# Patient Record
Sex: FEMALE | Race: WHITE | NOT HISPANIC OR LATINO
[De-identification: names, ages, dates, MRNs, and addresses within clinical notes are randomized per-mention and may not be internally consistent; named-entity substitution may affect disease eponyms.]

---

## 2017-06-30 ENCOUNTER — LABORATORY RESULT (OUTPATIENT)
Age: 62
End: 2017-06-30

## 2017-06-30 ENCOUNTER — APPOINTMENT (OUTPATIENT)
Dept: INTERNAL MEDICINE | Facility: CLINIC | Age: 62
End: 2017-06-30
Payer: SELF-PAY

## 2017-06-30 VITALS
HEART RATE: 80 BPM | SYSTOLIC BLOOD PRESSURE: 110 MMHG | OXYGEN SATURATION: 97 % | WEIGHT: 140 LBS | DIASTOLIC BLOOD PRESSURE: 80 MMHG | BODY MASS INDEX: 28.22 KG/M2 | HEIGHT: 59 IN | TEMPERATURE: 98.7 F

## 2017-06-30 VITALS — DIASTOLIC BLOOD PRESSURE: 70 MMHG | SYSTOLIC BLOOD PRESSURE: 120 MMHG

## 2017-06-30 DIAGNOSIS — K59.00 CONSTIPATION, UNSPECIFIED: ICD-10-CM

## 2017-06-30 PROCEDURE — 99213 OFFICE O/P EST LOW 20 MIN: CPT | Mod: 25

## 2017-06-30 PROCEDURE — 36415 COLL VENOUS BLD VENIPUNCTURE: CPT

## 2017-07-03 LAB
ALBUMIN SERPL ELPH-MCNC: 4.4 G/DL
ALP BLD-CCNC: 51 U/L
ALT SERPL-CCNC: 11 U/L
ANION GAP SERPL CALC-SCNC: 17 MMOL/L
AST SERPL-CCNC: 20 U/L
BASOPHILS # BLD AUTO: 0.01 K/UL
BASOPHILS NFR BLD AUTO: 0.2 %
BILIRUB SERPL-MCNC: 0.7 MG/DL
BUN SERPL-MCNC: 15 MG/DL
CALCIUM SERPL-MCNC: 10.3 MG/DL
CHLORIDE SERPL-SCNC: 103 MMOL/L
CO2 SERPL-SCNC: 22 MMOL/L
CREAT SERPL-MCNC: 0.83 MG/DL
EOSINOPHIL # BLD AUTO: 0.07 K/UL
EOSINOPHIL NFR BLD AUTO: 1.1 %
GLUCOSE SERPL-MCNC: 94 MG/DL
HCT VFR BLD CALC: 41.1 %
HGB BLD-MCNC: 13.2 G/DL
IMM GRANULOCYTES NFR BLD AUTO: 0.2 %
LYMPHOCYTES # BLD AUTO: 1.39 K/UL
LYMPHOCYTES NFR BLD AUTO: 22 %
MAN DIFF?: NORMAL
MCHC RBC-ENTMCNC: 29.7 PG
MCHC RBC-ENTMCNC: 32.1 GM/DL
MCV RBC AUTO: 92.6 FL
MONOCYTES # BLD AUTO: 0.34 K/UL
MONOCYTES NFR BLD AUTO: 5.4 %
NEUTROPHILS # BLD AUTO: 4.51 K/UL
NEUTROPHILS NFR BLD AUTO: 71.1 %
PLATELET # BLD AUTO: 270 K/UL
POTASSIUM SERPL-SCNC: 4.3 MMOL/L
PROT SERPL-MCNC: 7.7 G/DL
RBC # BLD: 4.44 M/UL
RBC # FLD: 13.2 %
SODIUM SERPL-SCNC: 142 MMOL/L
WBC # FLD AUTO: 6.33 K/UL

## 2017-07-13 ENCOUNTER — APPOINTMENT (OUTPATIENT)
Dept: INTERNAL MEDICINE | Facility: CLINIC | Age: 62
End: 2017-07-13

## 2017-07-13 ENCOUNTER — APPOINTMENT (OUTPATIENT)
Dept: INTERNAL MEDICINE | Facility: CLINIC | Age: 62
End: 2017-07-13
Payer: MEDICAID

## 2017-07-13 VITALS
WEIGHT: 142 LBS | DIASTOLIC BLOOD PRESSURE: 74 MMHG | BODY MASS INDEX: 28.68 KG/M2 | HEART RATE: 94 BPM | SYSTOLIC BLOOD PRESSURE: 138 MMHG

## 2017-07-13 DIAGNOSIS — Z87.09 PERSONAL HISTORY OF OTHER DISEASES OF THE RESPIRATORY SYSTEM: ICD-10-CM

## 2017-07-13 DIAGNOSIS — Z23 ENCOUNTER FOR IMMUNIZATION: ICD-10-CM

## 2017-07-13 DIAGNOSIS — Z87.898 PERSONAL HISTORY OF OTHER SPECIFIED CONDITIONS: ICD-10-CM

## 2017-07-13 DIAGNOSIS — Z12.11 ENCOUNTER FOR SCREENING FOR MALIGNANT NEOPLASM OF COLON: ICD-10-CM

## 2017-07-13 DIAGNOSIS — Z87.440 PERSONAL HISTORY OF URINARY (TRACT) INFECTIONS: ICD-10-CM

## 2017-07-13 DIAGNOSIS — Z87.891 PERSONAL HISTORY OF NICOTINE DEPENDENCE: ICD-10-CM

## 2017-07-13 DIAGNOSIS — M79.89 OTHER SPECIFIED SOFT TISSUE DISORDERS: ICD-10-CM

## 2017-07-13 DIAGNOSIS — Z12.4 ENCOUNTER FOR SCREENING FOR MALIGNANT NEOPLASM OF CERVIX: ICD-10-CM

## 2017-07-13 DIAGNOSIS — R14.0 ABDOMINAL DISTENSION (GASEOUS): ICD-10-CM

## 2017-07-13 DIAGNOSIS — Z12.31 ENCOUNTER FOR SCREENING MAMMOGRAM FOR MALIGNANT NEOPLASM OF BREAST: ICD-10-CM

## 2017-07-13 DIAGNOSIS — R06.83 SNORING: ICD-10-CM

## 2017-07-13 DIAGNOSIS — R20.8 OTHER DISTURBANCES OF SKIN SENSATION: ICD-10-CM

## 2017-07-13 PROCEDURE — 93306 TTE W/DOPPLER COMPLETE: CPT

## 2017-07-13 PROCEDURE — 99214 OFFICE O/P EST MOD 30 MIN: CPT | Mod: 25

## 2017-07-13 PROCEDURE — 93000 ELECTROCARDIOGRAM COMPLETE: CPT

## 2017-07-13 RX ORDER — SIMETHICONE 80 MG/1
80 TABLET, CHEWABLE ORAL
Qty: 1 | Refills: 0 | Status: DISCONTINUED | COMMUNITY
Start: 2017-06-30 | End: 2017-07-13

## 2017-07-19 ENCOUNTER — APPOINTMENT (OUTPATIENT)
Dept: INTERNAL MEDICINE | Facility: CLINIC | Age: 62
End: 2017-07-19
Payer: MEDICAID

## 2017-07-19 VITALS — SYSTOLIC BLOOD PRESSURE: 118 MMHG | DIASTOLIC BLOOD PRESSURE: 70 MMHG

## 2017-07-19 VITALS
TEMPERATURE: 98.6 F | SYSTOLIC BLOOD PRESSURE: 102 MMHG | BODY MASS INDEX: 28.68 KG/M2 | DIASTOLIC BLOOD PRESSURE: 74 MMHG | WEIGHT: 142 LBS

## 2017-07-19 DIAGNOSIS — E66.3 OVERWEIGHT: ICD-10-CM

## 2017-07-19 DIAGNOSIS — R00.2 PALPITATIONS: ICD-10-CM

## 2017-07-19 PROCEDURE — 99243 OFF/OP CNSLTJ NEW/EST LOW 30: CPT

## 2017-08-03 ENCOUNTER — APPOINTMENT (OUTPATIENT)
Dept: INTERNAL MEDICINE | Facility: CLINIC | Age: 62
End: 2017-08-03

## 2017-08-29 ENCOUNTER — APPOINTMENT (OUTPATIENT)
Dept: INTERNAL MEDICINE | Facility: CLINIC | Age: 62
End: 2017-08-29

## 2017-09-27 ENCOUNTER — APPOINTMENT (OUTPATIENT)
Dept: INTERNAL MEDICINE | Facility: CLINIC | Age: 62
End: 2017-09-27
Payer: MEDICAID

## 2017-09-27 VITALS
BODY MASS INDEX: 28.22 KG/M2 | TEMPERATURE: 98.2 F | SYSTOLIC BLOOD PRESSURE: 112 MMHG | HEIGHT: 59 IN | OXYGEN SATURATION: 98 % | DIASTOLIC BLOOD PRESSURE: 68 MMHG | HEART RATE: 86 BPM | RESPIRATION RATE: 16 BRPM | WEIGHT: 140 LBS

## 2017-09-27 DIAGNOSIS — M54.5 LOW BACK PAIN: ICD-10-CM

## 2017-09-27 DIAGNOSIS — Z86.018 PERSONAL HISTORY OF OTHER BENIGN NEOPLASM: ICD-10-CM

## 2017-09-27 LAB
BILIRUB UR QL STRIP: NEGATIVE
GLUCOSE UR-MCNC: NORMAL
HCG UR QL: 0.2 EU/DL
HGB UR QL STRIP.AUTO: NEGATIVE
KETONES UR-MCNC: NEGATIVE
LEUKOCYTE ESTERASE UR QL STRIP: NEGATIVE
NITRITE UR QL STRIP: NEGATIVE
PH UR STRIP: 6.5
PROT UR STRIP-MCNC: NEGATIVE
SP GR UR STRIP: 1.02

## 2017-09-27 PROCEDURE — 81002 URINALYSIS NONAUTO W/O SCOPE: CPT

## 2017-09-27 PROCEDURE — 99214 OFFICE O/P EST MOD 30 MIN: CPT | Mod: 25

## 2017-09-28 ENCOUNTER — APPOINTMENT (OUTPATIENT)
Dept: ULTRASOUND IMAGING | Facility: CLINIC | Age: 62
End: 2017-09-28
Payer: MEDICAID

## 2017-09-28 ENCOUNTER — OUTPATIENT (OUTPATIENT)
Dept: OUTPATIENT SERVICES | Facility: HOSPITAL | Age: 62
LOS: 1 days | End: 2017-09-28
Payer: MEDICAID

## 2017-09-28 DIAGNOSIS — Z00.8 ENCOUNTER FOR OTHER GENERAL EXAMINATION: ICD-10-CM

## 2017-09-28 LAB
APPEARANCE: CLEAR
BILIRUBIN URINE: NEGATIVE
BLOOD URINE: NEGATIVE
COLOR: YELLOW
GLUCOSE QUALITATIVE U: NORMAL MG/DL
KETONES URINE: NEGATIVE
LEUKOCYTE ESTERASE URINE: NEGATIVE
NITRITE URINE: NEGATIVE
PH URINE: 6.5
PROTEIN URINE: NEGATIVE MG/DL
SPECIFIC GRAVITY URINE: 1.02
UROBILINOGEN URINE: NORMAL MG/DL

## 2017-09-28 PROCEDURE — 76700 US EXAM ABDOM COMPLETE: CPT | Mod: 26

## 2017-09-28 PROCEDURE — 76856 US EXAM PELVIC COMPLETE: CPT | Mod: 26

## 2017-09-28 PROCEDURE — 76700 US EXAM ABDOM COMPLETE: CPT

## 2017-09-28 PROCEDURE — 76830 TRANSVAGINAL US NON-OB: CPT

## 2017-09-28 PROCEDURE — 76856 US EXAM PELVIC COMPLETE: CPT

## 2017-09-28 PROCEDURE — 76830 TRANSVAGINAL US NON-OB: CPT | Mod: 26

## 2017-09-29 ENCOUNTER — CHART COPY (OUTPATIENT)
Age: 62
End: 2017-09-29

## 2018-01-03 ENCOUNTER — APPOINTMENT (OUTPATIENT)
Dept: INTERNAL MEDICINE | Facility: CLINIC | Age: 63
End: 2018-01-03

## 2018-07-20 ENCOUNTER — LABORATORY RESULT (OUTPATIENT)
Age: 63
End: 2018-07-20

## 2018-07-20 ENCOUNTER — APPOINTMENT (OUTPATIENT)
Dept: INTERNAL MEDICINE | Facility: CLINIC | Age: 63
End: 2018-07-20
Payer: MEDICAID

## 2018-07-20 VITALS
HEART RATE: 94 BPM | DIASTOLIC BLOOD PRESSURE: 80 MMHG | HEIGHT: 59 IN | BODY MASS INDEX: 28.63 KG/M2 | WEIGHT: 142 LBS | OXYGEN SATURATION: 97 % | SYSTOLIC BLOOD PRESSURE: 110 MMHG

## 2018-07-20 DIAGNOSIS — M25.649 STIFFNESS OF UNSPECIFIED HAND, NOT ELSEWHERE CLASSIFIED: ICD-10-CM

## 2018-07-20 DIAGNOSIS — M25.676 STIFFNESS OF UNSPECIFIED FOOT, NOT ELSEWHERE CLASSIFIED: ICD-10-CM

## 2018-07-20 DIAGNOSIS — Z01.818 ENCOUNTER FOR OTHER PREPROCEDURAL EXAMINATION: ICD-10-CM

## 2018-07-20 DIAGNOSIS — R22.30 LOCALIZED SWELLING, MASS AND LUMP, UNSPECIFIED UPPER LIMB: ICD-10-CM

## 2018-07-20 DIAGNOSIS — R10.31 RIGHT LOWER QUADRANT PAIN: ICD-10-CM

## 2018-07-20 PROCEDURE — 36415 COLL VENOUS BLD VENIPUNCTURE: CPT

## 2018-07-20 PROCEDURE — 99214 OFFICE O/P EST MOD 30 MIN: CPT | Mod: 25

## 2018-07-20 RX ORDER — MELOXICAM 15 MG/1
15 TABLET ORAL
Qty: 30 | Refills: 1 | Status: DISCONTINUED | COMMUNITY
Start: 2017-09-27 | End: 2018-07-20

## 2018-07-20 NOTE — PHYSICAL EXAM
[No Joint Swelling] : no joint swelling [de-identified] : L foot- tenderness - general tenderness of all MT- P, PIP jt but more 1st toe.  R foot tenderness 2nd MT- P jt.  R hand- no tenderness, swelling- good ROM of wrist , jt.  L hand- tenderness PIP, DIP jt of all fingers- good ROM

## 2018-07-20 NOTE — HISTORY OF PRESENT ILLNESS
[FreeTextEntry8] : CC: stiffness in fingers , feet\par \par 6 wk of  (?tingling)stiffness, wax and waning cramping  on all fingers ,L foot- all toes.  pain DIP jt - 2 nd- area of nodule-4-5 wk ago.  unsure how long she had the nodule\par pt also hand redness of DIP jt -unsure duration but it resolved.  no swelling of jt\par \par now - cramping resolved for the past 3 wk but still has stiffness. stiffness- more at night.  duration of stiffness- constant\par no other alleviating/ aggravating factors. no meds taken\par no fever, \par post nasal drip- chronic- tickle in throat, nasal congestion.  not taking any meds.  previous tried flonase and didn't like the sensation of it.\par

## 2018-07-20 NOTE — REVIEW OF SYSTEMS
[Fever] : no fever [Joint Swelling] : no joint swelling [Skin Rash] : no skin rash [FreeTextEntry1] : see HPI

## 2018-07-23 LAB
ALBUMIN SERPL ELPH-MCNC: 4.2 G/DL
ALP BLD-CCNC: 43 U/L
ALT SERPL-CCNC: 13 U/L
ANION GAP SERPL CALC-SCNC: 12 MMOL/L
AST SERPL-CCNC: 19 U/L
B BURGDOR IGG+IGM SER QL IB: NORMAL
BILIRUB SERPL-MCNC: 0.4 MG/DL
BUN SERPL-MCNC: 11 MG/DL
CALCIUM SERPL-MCNC: 9.4 MG/DL
CHLORIDE SERPL-SCNC: 104 MMOL/L
CO2 SERPL-SCNC: 26 MMOL/L
CREAT SERPL-MCNC: 0.69 MG/DL
ERYTHROCYTE [SEDIMENTATION RATE] IN BLOOD BY WESTERGREN METHOD: 24 MM/HR
GLUCOSE SERPL-MCNC: 98 MG/DL
MAGNESIUM SERPL-MCNC: 2 MG/DL
POTASSIUM SERPL-SCNC: 4.7 MMOL/L
PROT SERPL-MCNC: 6.9 G/DL
SODIUM SERPL-SCNC: 142 MMOL/L
TSH SERPL-ACNC: 1.66 UIU/ML

## 2018-08-14 ENCOUNTER — APPOINTMENT (OUTPATIENT)
Dept: ORTHOPEDIC SURGERY | Facility: CLINIC | Age: 63
End: 2018-08-14

## 2018-08-15 ENCOUNTER — APPOINTMENT (OUTPATIENT)
Dept: INTERNAL MEDICINE | Facility: CLINIC | Age: 63
End: 2018-08-15
Payer: MEDICAID

## 2018-08-15 PROCEDURE — 93306 TTE W/DOPPLER COMPLETE: CPT | Mod: TC

## 2018-08-15 PROCEDURE — 93306 TTE W/DOPPLER COMPLETE: CPT | Mod: 26

## 2018-09-17 ENCOUNTER — RX RENEWAL (OUTPATIENT)
Age: 63
End: 2018-09-17

## 2018-10-19 ENCOUNTER — RX RENEWAL (OUTPATIENT)
Age: 63
End: 2018-10-19

## 2018-12-01 ENCOUNTER — TRANSCRIPTION ENCOUNTER (OUTPATIENT)
Age: 63
End: 2018-12-01

## 2018-12-07 ENCOUNTER — APPOINTMENT (OUTPATIENT)
Dept: INTERNAL MEDICINE | Facility: CLINIC | Age: 63
End: 2018-12-07
Payer: COMMERCIAL

## 2018-12-07 VITALS
OXYGEN SATURATION: 98 % | HEART RATE: 80 BPM | TEMPERATURE: 97.6 F | WEIGHT: 134 LBS | SYSTOLIC BLOOD PRESSURE: 120 MMHG | HEIGHT: 59 IN | BODY MASS INDEX: 27.01 KG/M2 | DIASTOLIC BLOOD PRESSURE: 80 MMHG

## 2018-12-07 DIAGNOSIS — E73.9 LACTOSE INTOLERANCE, UNSPECIFIED: ICD-10-CM

## 2018-12-07 DIAGNOSIS — M79.646 PAIN IN UNSPECIFIED FINGER(S): ICD-10-CM

## 2018-12-07 DIAGNOSIS — L30.9 DERMATITIS, UNSPECIFIED: ICD-10-CM

## 2018-12-07 DIAGNOSIS — R09.82 POSTNASAL DRIP: ICD-10-CM

## 2018-12-07 DIAGNOSIS — R19.7 DIARRHEA, UNSPECIFIED: ICD-10-CM

## 2018-12-07 PROCEDURE — 99214 OFFICE O/P EST MOD 30 MIN: CPT

## 2018-12-07 RX ORDER — LORATADINE 5 MG/5 ML
10 SOLUTION, ORAL ORAL
Qty: 30 | Refills: 2 | Status: DISCONTINUED | COMMUNITY
Start: 2018-07-20 | End: 2018-12-07

## 2018-12-07 RX ORDER — MONTELUKAST 10 MG/1
10 TABLET, FILM COATED ORAL DAILY
Qty: 30 | Refills: 1 | Status: DISCONTINUED | COMMUNITY
Start: 2018-07-20 | End: 2018-12-07

## 2018-12-07 NOTE — PLAN
[FreeTextEntry1] : use A&D ointment after bm.  use hydrocortisone if itching\par diarrhea- due to pt eating cheese.  pt is lactose intolerant-pt to be compliant w lactose free diet. \par if pt is still having issues- she will call me.

## 2018-12-07 NOTE — HISTORY OF PRESENT ILLNESS
[FreeTextEntry8] : CC: rectal\par pt went to UC for irritation around anal area from constant wiping- for past few days..  pt was given a meds- which caused her have nausea, vomiting. no chg in bm\par freq bm-  states has lactose intolerance- eating cheese- pt was have diarrhea, abd cramping, gassy when she eats cheese\par \par post nasal drip last visit- pt was started on claritin, singular- medication did help but she is non compliant w meds. \par nodule on finger- pt was told to f/u w Ortho- didn't f/u b/c it didn't bother her. \par

## 2018-12-14 ENCOUNTER — RESULT REVIEW (OUTPATIENT)
Age: 63
End: 2018-12-14

## 2018-12-31 ENCOUNTER — RX RENEWAL (OUTPATIENT)
Age: 63
End: 2018-12-31

## 2019-01-31 ENCOUNTER — RX RENEWAL (OUTPATIENT)
Age: 64
End: 2019-01-31

## 2019-03-04 ENCOUNTER — RX RENEWAL (OUTPATIENT)
Age: 64
End: 2019-03-04

## 2019-05-15 ENCOUNTER — LABORATORY RESULT (OUTPATIENT)
Age: 64
End: 2019-05-15

## 2019-05-15 DIAGNOSIS — Z12.31 ENCOUNTER FOR SCREENING MAMMOGRAM FOR MALIGNANT NEOPLASM OF BREAST: ICD-10-CM

## 2019-07-08 ENCOUNTER — RESULT REVIEW (OUTPATIENT)
Age: 64
End: 2019-07-08

## 2019-07-08 DIAGNOSIS — N60.09 SOLITARY CYST OF UNSPECIFIED BREAST: ICD-10-CM

## 2020-07-07 ENCOUNTER — APPOINTMENT (OUTPATIENT)
Dept: ORTHOPEDIC SURGERY | Facility: CLINIC | Age: 65
End: 2020-07-07
Payer: COMMERCIAL

## 2020-07-07 PROCEDURE — 73130 X-RAY EXAM OF HAND: CPT | Mod: LT

## 2020-07-07 PROCEDURE — 99204 OFFICE O/P NEW MOD 45 MIN: CPT | Mod: 25

## 2020-07-07 PROCEDURE — 29125 APPL SHORT ARM SPLINT STATIC: CPT | Mod: LT

## 2020-07-07 NOTE — HISTORY OF PRESENT ILLNESS
[de-identified] : Aida Zaragoza 64 Y LHD F who presents with left hand pain.  She states that 5 weeks ago fell onto her left hand.  She was seen in the ER where xrays demonstrated a 3rd metacarpal fracture.  She was was put in a cast.  She reports that during her last visit doctor visit, her cast was removed prematurely and she was given a removable wrist splint.   The wrist splint is causing her to have more pain in her palm  Her pain is a 6/10 and radiates down her forearm and into her 3rd-5th digits. She cannot flex the digits without pain.  No numbness.

## 2020-07-07 NOTE — PHYSICAL EXAM
[de-identified] : Constitutional: Well-nourished, well-developed, No acute distress\par Respiratory:  Good respiratory effort, no SOB\par Lymphatic: No regional lymphadenopathy, no lymphedema\par Psychiatric: Pleasant and normal affect, alert and oriented x3\par Skin: Clean dry and intact B/L UE/LE\par Musculoskeletal: normal except where as noted in regional exam\par \par \par left Hand:\par APPEARANCE: + swelling dorsum of hand\par POSITIVE TENDERNESS: ++ tenderness along 3rd metacarpal\par ROM unable to flex digit due to pain.\par no malalignment on closed fist, but limited due to pain\par Vasc: 2+ radial pulse, normal capillary refill\par Neuro: AIN, PIN, Ulnar nerve intact to motor\par Sensation: Intact to light touch throughout\par B/L Shoulders:  No asymmetry, malalignment, or swelling, Full ROM, 5/5 strength in flexion/ext, Abd/Add, IR/ER, Joints stable\par B/L Elbows:  No asymmetry, malalignment, or swelling, Full ROM, 5/5 strength in flex/ext, pronation/supination, Joints stable\par B/L wrists: No asymmetry, malalignment, or swelling, Full ROM, 5/5 strength in wrist flexion/ext, and radial/ulnar deviation, Joints stable\par \par \par \par  [de-identified] : \par The following radiographs were ordered and read by me during this patient's visit. I reviewed each radiograph in detail with the patient and discussed the findings as highlighted below. \par \par 3 views of the left hand were obtained today that show a spiral fracture of the 3rd metacarpal

## 2020-07-07 NOTE — DISCUSSION/SUMMARY
[de-identified] : Aida presents 5 weeks s/p 3rd metacarpal fracture.  She continues to have severe pain and restricted range of motion despite weeks of immobilization.  No obvious overlapping of the digits, but testing is limited by pain.  Temporary metacarpal splint placed today.  I have referred her to hand surgery to discuss further treatment options.\par \par Antoinette Ray MD, EdM\par Sports Medicine [facility]\par Department of Orthopedics\par

## 2020-07-07 NOTE — PROCEDURE
[de-identified] : Indication: metacarpal fracture\par Splint type: short arm metacarpal splint\par \par The above cast was applied without incident, distal neurovascular testing was intact after application.  Patient tolerated the procedure well.\par

## 2020-07-16 ENCOUNTER — APPOINTMENT (OUTPATIENT)
Dept: ORTHOPEDIC SURGERY | Facility: CLINIC | Age: 65
End: 2020-07-16
Payer: COMMERCIAL

## 2020-07-16 DIAGNOSIS — S62.309A UNSPECIFIED FRACTURE OF UNSPECIFIED METACARPAL BONE, INITIAL ENCOUNTER FOR CLOSED FRACTURE: ICD-10-CM

## 2020-07-16 PROCEDURE — 29125 APPL SHORT ARM SPLINT STATIC: CPT | Mod: LT

## 2020-07-16 PROCEDURE — 99214 OFFICE O/P EST MOD 30 MIN: CPT | Mod: 25

## 2020-07-16 NOTE — DISCUSSION/SUMMARY
[de-identified] : The underlying pathophysiology was reviewed with the patient. Treatment options were discussed including; observation, NSAIDS, analgesics, bracing, physical therapy. \par \par Patient was molded into a short arm splint. She was advised to utilize the splint so long as she is having pain and then wean herself off of it once she is better able to tolerate her symptoms. \par She should work on pumping the hand and using it for ADLs. \par The patient was advised to soak the hand in warm water and Epsom salt. \par Topical analgesics as needed. \par NSAIDs, as tolerated, were advised for pain and symptom management. \par Follow up in 4 weeks for repeat x-rays.

## 2020-07-16 NOTE — ADDENDUM
[FreeTextEntry1] : I, Devorah Restrepo wrote this note acting as a scribe for Dr. Christos Hoffman on Jul 16, 2020.

## 2020-07-16 NOTE — PHYSICAL EXAM
[de-identified] : X-rays of the left hand taken on 07/07/2020 revealed a mildly displaced spiral fracture along the third metacarpal.  [de-identified] : Patient is WDWN, alert, and in no acute distress. Breathing is unlabored. She is grossly oriented to person, place, and time. \par \par Left hand: Tenderness to palpation over the third metacarpal shaft. No visible deformities. No no malalignment on closed fist, but limited due to pain. ROM unable to flex digit due to pain. All intrinsic and extrinsic hand muscles 5/5. No joint instability on provocative testing. Sensation is intact to light touch.

## 2020-07-16 NOTE — CONSULT LETTER
[Consult Letter:] : I had the pleasure of evaluating your patient, [unfilled]. [Dear  ___] : Dear  [unfilled], [Please see my note below.] : Please see my note below. [Consult Closing:] : Thank you very much for allowing me to participate in the care of this patient.  If you have any questions, please do not hesitate to contact me. [Sincerely,] : Sincerely, [FreeTextEntry2] : EFRAÍN TRIANA,SAYRA WILLIAMSON  [FreeTextEntry3] : Christos Hoffman MD

## 2020-07-16 NOTE — END OF VISIT
[FreeTextEntry3] : I, Christos Hoffman MD, ordering physician, have read and attest that all the information, medical decision making and discharge instructions within are true and accurate.

## 2020-07-16 NOTE — HISTORY OF PRESENT ILLNESS
[de-identified] : Patient is a LHD 64 year old female who presents with a left third metacarpal injury. She injured the hand x 6 weeks ago while she was in [place]. She was treated at  [facility] ED where x-rays were taken. X-rays revealed a spiral fracture through the third metacarpal shaft. A short arm cast was applied however she returned several days later as the cast was too tight. She was recasted and returned several weeks later for repeat x-rays. She states that the attending MA removed the cast prematurely at the 3.5 week nikia. She was placed in  a short arm splint, however she reports it being inadequate as it did not extend the entire length of the palm. The patient was referred here today after being seen by Dr. Antoinette Ray on 07/07/2020. She continues to experience pain in the hand especially when attempting to  or hold anything with it, although she has admitted to it improving slowly but gradually. She denies numbness or tingling. Denies DM or tobacco use. \par

## 2020-12-07 ENCOUNTER — APPOINTMENT (OUTPATIENT)
Dept: INTERNAL MEDICINE | Facility: CLINIC | Age: 65
End: 2020-12-07
Payer: COMMERCIAL

## 2020-12-07 DIAGNOSIS — H92.02 OTALGIA, LEFT EAR: ICD-10-CM

## 2020-12-07 DIAGNOSIS — H92.09 OTALGIA, UNSPECIFIED EAR: ICD-10-CM

## 2020-12-07 PROCEDURE — 99213 OFFICE O/P EST LOW 20 MIN: CPT | Mod: 95

## 2020-12-30 ENCOUNTER — TRANSCRIPTION ENCOUNTER (OUTPATIENT)
Age: 65
End: 2020-12-30

## 2020-12-31 ENCOUNTER — LABORATORY RESULT (OUTPATIENT)
Age: 65
End: 2020-12-31

## 2020-12-31 ENCOUNTER — APPOINTMENT (OUTPATIENT)
Dept: INTERNAL MEDICINE | Facility: CLINIC | Age: 65
End: 2020-12-31
Payer: COMMERCIAL

## 2020-12-31 VITALS
SYSTOLIC BLOOD PRESSURE: 102 MMHG | BODY MASS INDEX: 26.81 KG/M2 | WEIGHT: 133 LBS | HEIGHT: 59 IN | DIASTOLIC BLOOD PRESSURE: 60 MMHG

## 2020-12-31 VITALS — TEMPERATURE: 98.1 F

## 2020-12-31 PROCEDURE — 99213 OFFICE O/P EST LOW 20 MIN: CPT

## 2020-12-31 PROCEDURE — 99072 ADDL SUPL MATRL&STAF TM PHE: CPT

## 2020-12-31 RX ORDER — AZITHROMYCIN 250 MG/1
250 TABLET, FILM COATED ORAL
Qty: 1 | Refills: 0 | Status: COMPLETED | COMMUNITY
Start: 2020-12-10 | End: 2020-12-31

## 2020-12-31 NOTE — HISTORY OF PRESENT ILLNESS
[FreeTextEntry8] : pt presents c/o 2 days of dysuria and dark urine and mild chills overnight--c/w previous UTIs. She admits she wasn't drinking much fluids  yesterday because she was "running around doing errands all day"\par She has  h/o recurrent UTIs in the past -last UTI was 6-12 months ago.\par \par No flank pain or incontinence.\par \par

## 2020-12-31 NOTE — REVIEW OF SYSTEMS
[Chills] : chills [Dysuria] : dysuria [Hematuria] : hematuria [Frequency] : frequency [Fever] : no fever [Incontinence] : no incontinence [Nocturia] : no nocturia

## 2021-01-01 ENCOUNTER — NON-APPOINTMENT (OUTPATIENT)
Age: 66
End: 2021-01-01

## 2021-01-02 ENCOUNTER — NON-APPOINTMENT (OUTPATIENT)
Age: 66
End: 2021-01-02

## 2021-09-02 DIAGNOSIS — R92.2 INCONCLUSIVE MAMMOGRAM: ICD-10-CM

## 2022-01-06 ENCOUNTER — NON-APPOINTMENT (OUTPATIENT)
Age: 67
End: 2022-01-06

## 2022-01-11 ENCOUNTER — APPOINTMENT (OUTPATIENT)
Dept: ORTHOPEDIC SURGERY | Facility: CLINIC | Age: 67
End: 2022-01-11
Payer: MEDICARE

## 2022-01-11 PROCEDURE — 99204 OFFICE O/P NEW MOD 45 MIN: CPT

## 2022-01-11 PROCEDURE — 73130 X-RAY EXAM OF HAND: CPT | Mod: LT

## 2022-05-12 ENCOUNTER — APPOINTMENT (OUTPATIENT)
Dept: INTERNAL MEDICINE | Facility: CLINIC | Age: 67
End: 2022-05-12
Payer: COMMERCIAL

## 2022-05-12 ENCOUNTER — LABORATORY RESULT (OUTPATIENT)
Age: 67
End: 2022-05-12

## 2022-05-12 ENCOUNTER — NON-APPOINTMENT (OUTPATIENT)
Age: 67
End: 2022-05-12

## 2022-05-12 VITALS
WEIGHT: 132 LBS | HEIGHT: 59 IN | TEMPERATURE: 97.4 F | DIASTOLIC BLOOD PRESSURE: 70 MMHG | SYSTOLIC BLOOD PRESSURE: 106 MMHG | BODY MASS INDEX: 26.61 KG/M2

## 2022-05-12 DIAGNOSIS — I34.0 NONRHEUMATIC MITRAL (VALVE) INSUFFICIENCY: ICD-10-CM

## 2022-05-12 DIAGNOSIS — Z23 ENCOUNTER FOR IMMUNIZATION: ICD-10-CM

## 2022-05-12 DIAGNOSIS — Z81.4 FAMILY HISTORY OF OTHER SUBSTANCE ABUSE AND DEPENDENCE: ICD-10-CM

## 2022-05-12 DIAGNOSIS — M85.89 OTHER SPECIFIED DISORDERS OF BONE DENSITY AND STRUCTURE, MULTIPLE SITES: ICD-10-CM

## 2022-05-12 DIAGNOSIS — H91.93 UNSPECIFIED HEARING LOSS, BILATERAL: ICD-10-CM

## 2022-05-12 DIAGNOSIS — Z82.49 FAMILY HISTORY OF ISCHEMIC HEART DISEASE AND OTHER DISEASES OF THE CIRCULATORY SYSTEM: ICD-10-CM

## 2022-05-12 DIAGNOSIS — Z01.84 ENCOUNTER FOR ANTIBODY RESPONSE EXAMINATION: ICD-10-CM

## 2022-05-12 DIAGNOSIS — Z00.00 ENCOUNTER FOR GENERAL ADULT MEDICAL EXAMINATION W/OUT ABNORMAL FINDINGS: ICD-10-CM

## 2022-05-12 DIAGNOSIS — R53.83 OTHER FATIGUE: ICD-10-CM

## 2022-05-12 DIAGNOSIS — Z82.61 FAMILY HISTORY OF ARTHRITIS: ICD-10-CM

## 2022-05-12 DIAGNOSIS — Z13.820 ENCOUNTER FOR SCREENING FOR OSTEOPOROSIS: ICD-10-CM

## 2022-05-12 PROCEDURE — G0402 INITIAL PREVENTIVE EXAM: CPT

## 2022-05-12 PROCEDURE — 90472 IMMUNIZATION ADMIN EACH ADD: CPT

## 2022-05-12 PROCEDURE — G0403: CPT

## 2022-05-12 PROCEDURE — 99214 OFFICE O/P EST MOD 30 MIN: CPT | Mod: 25

## 2022-05-12 PROCEDURE — 90670 PCV13 VACCINE IM: CPT

## 2022-05-12 PROCEDURE — G0009: CPT

## 2022-05-12 PROCEDURE — 90715 TDAP VACCINE 7 YRS/> IM: CPT

## 2022-05-16 PROBLEM — Z23 NEED FOR TDAP VACCINATION: Status: ACTIVE | Noted: 2022-05-12

## 2022-05-16 PROBLEM — Z81.4 FAMILY HISTORY OF SUBSTANCE ABUSE: Status: ACTIVE | Noted: 2022-05-16

## 2022-05-16 PROBLEM — H91.93 DECREASED HEARING OF BOTH EARS: Status: ACTIVE | Noted: 2022-05-12

## 2022-05-16 NOTE — HEALTH RISK ASSESSMENT
[Former] : Former [Patient reported mammogram was normal] : Patient reported mammogram was normal [Patient reported bone density results were abnormal] : Patient reported bone density results were abnormal [HIV test declined] : HIV test declined [Hepatitis C test declined] : Hepatitis C test declined [5-9] : 5-9 [Never (0 pts)] : Never (0 points) [No] : In the past 12 months have you used drugs other than those required for medical reasons? No [No falls in past year] : Patient reported no falls in the past year [0] : 2) Feeling down, depressed, or hopeless: Not at all (0) [Patient reported PAP Smear was normal] : Patient reported PAP Smear was normal [Intercurrent Urgi Care visits] : went to urgent care [Patient declined colonoscopy] : Patient declined colonoscopy [None] : None [With Family] : lives with family [Retired] : retired [College] : College [] :  [# Of Children ___] : has [unfilled] children [Feels Safe at Home] : Feels safe at home [Fully functional (bathing, dressing, toileting, transferring, walking, feeding)] : Fully functional (bathing, dressing, toileting, transferring, walking, feeding) [Fully functional (using the telephone, shopping, preparing meals, housekeeping, doing laundry, using] : Fully functional and needs no help or supervision to perform IADLs (using the telephone, shopping, preparing meals, housekeeping, doing laundry, using transportation, managing medications and managing finances) [Reports changes in hearing] : Reports changes in hearing [Reports normal functional visual acuity (ie: able to read med bottle)] : Reports normal functional visual acuity [Smoke Detector] : smoke detector [Carbon Monoxide Detector] : carbon monoxide detector [Seat Belt] :  uses seat belt [Sunscreen] : uses sunscreen [With Patient/Caregiver] : , with patient/caregiver [Designated Healthcare Proxy] : Designated healthcare proxy [Name: ___] : Health Care Proxy's Name: [unfilled]  [Relationship: ___] : Relationship: [unfilled] [de-identified] : for UTI [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 [de-identified] : 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 [YearQuit] : 1981 [Audit-CScore] : 0 [de-identified] : walks daily for 45-60 minutes [de-identified] : admits to eating poorly "lots of junk food" [[id number]] : 0 [Change in mental status noted] : No change in mental status noted [Language] : denies difficulty with language [Behavior] : denies difficulty with behavior [Learning/Retaining New Information] : denies difficulty learning/retaining new information [Handling Complex Tasks] : denies difficulty handling complex tasks [Reasoning] : denies difficulty with reasoning [Spatial Ability and Orientation] : denies difficulty with spatial ability and orientation [Sexually Active] : not sexually active [High Risk Behavior] : no high risk behavior [Reports changes in vision] : Reports no changes in vision [Reports changes in dental health] : Reports no changes in dental health [Guns at Home] : no guns at home [Travel to Developing Areas] : does not  travel to developing areas [TB Exposure] : is not being exposed to tuberculosis [MammogramDate] : 09/21 [PapSmearDate] : 01/19 [PapSmearComments] : more than 2 years ago ? name--wants to see new gyn [BoneDensityDate] : 01/20 [BoneDensityComments] : h/o osteoporosis/osteopenia-had IV Reclast with gyn 4-5 yrs ago [ColonoscopyDate] : 05/19 [ColonoscopyComments] : did Cologuard [de-identified] : lives with her aunt [FreeTextEntry2] : retired form [employer] [AdvancecareDate] : 05/22

## 2022-05-16 NOTE — ASSESSMENT
[FreeTextEntry1] : Patient received the Moderna Covid vaccines on the following dates:\par \par 03/13/2021\par 04/10/2021\par \par 12/14/2021

## 2022-05-16 NOTE — HISTORY OF PRESENT ILLNESS
[FreeTextEntry1] : Pt. presents for a comprehensive evaluation for multiple medical issues.\par  [de-identified] : Patient has been in good overall health this past year.\par \par She has a ? h/o moderate to severe MR--discovered by echo 7/2017 incidentally,  She saw Dr. Golden Cosby for cardiac evaluation but has not had a f/u echo or evaluation since given no symptoms of palpitations, chest pressure of SOB or LH.\par \par She is  / retired from working for the [employer] in 2019 and lives with her aunt whom she cares for.  She has a 34 yr old daughter who is pregnant and due with a baby boy in 10/2022--It will be her first grandchild.\par \par Patient lives in a two family home above her 76 yo aunt who suffers from COPD-on chronic oxygen.\par \par She enjoys traveling--will be traveling to [place] with her aunt this summer\par \par

## 2022-05-17 LAB
25(OH)D3 SERPL-MCNC: 22.8 NG/ML
ALBUMIN SERPL ELPH-MCNC: 4.4 G/DL
ALP BLD-CCNC: 77 U/L
ALT SERPL-CCNC: 12 U/L
ANION GAP SERPL CALC-SCNC: 13 MMOL/L
APPEARANCE: CLEAR
AST SERPL-CCNC: 16 U/L
BASOPHILS # BLD AUTO: 0.03 K/UL
BASOPHILS NFR BLD AUTO: 0.6 %
BILIRUB SERPL-MCNC: 0.4 MG/DL
BILIRUBIN URINE: NEGATIVE
BLOOD URINE: NEGATIVE
BUN SERPL-MCNC: 11 MG/DL
CALCIUM SERPL-MCNC: 9.6 MG/DL
CHLORIDE SERPL-SCNC: 104 MMOL/L
CHOLEST SERPL-MCNC: 208 MG/DL
CO2 SERPL-SCNC: 25 MMOL/L
COLOR: NORMAL
COVID-19 NUCLEOCAPSID  GAM ANTIBODY INTERPRETATION: POSITIVE
CREAT SERPL-MCNC: 0.69 MG/DL
EGFR: 96 ML/MIN/1.73M2
EOSINOPHIL # BLD AUTO: 0.23 K/UL
EOSINOPHIL NFR BLD AUTO: 4.3 %
ERYTHROCYTE [SEDIMENTATION RATE] IN BLOOD BY WESTERGREN METHOD: 52 MM/HR
ESTIMATED AVERAGE GLUCOSE: 114 MG/DL
GLUCOSE QUALITATIVE U: NEGATIVE
GLUCOSE SERPL-MCNC: 98 MG/DL
HBA1C MFR BLD HPLC: 5.6 %
HCT VFR BLD CALC: 41.2 %
HDLC SERPL-MCNC: 52 MG/DL
HGB BLD-MCNC: 13.1 G/DL
IMM GRANULOCYTES NFR BLD AUTO: 0 %
KETONES URINE: NEGATIVE
LDLC SERPL CALC-MCNC: 142 MG/DL
LDLC SERPL DIRECT ASSAY-MCNC: 135 MG/DL
LEUKOCYTE ESTERASE URINE: ABNORMAL
LYMPHOCYTES # BLD AUTO: 1.52 K/UL
LYMPHOCYTES NFR BLD AUTO: 28.1 %
MAN DIFF?: NORMAL
MCHC RBC-ENTMCNC: 29.7 PG
MCHC RBC-ENTMCNC: 31.8 GM/DL
MCV RBC AUTO: 93.4 FL
MONOCYTES # BLD AUTO: 0.39 K/UL
MONOCYTES NFR BLD AUTO: 7.2 %
NEUTROPHILS # BLD AUTO: 3.24 K/UL
NEUTROPHILS NFR BLD AUTO: 59.8 %
NITRITE URINE: NEGATIVE
NONHDLC SERPL-MCNC: 156 MG/DL
PH URINE: 6.5
PLATELET # BLD AUTO: 226 K/UL
POTASSIUM SERPL-SCNC: 3.9 MMOL/L
PROT SERPL-MCNC: 7.1 G/DL
PROTEIN URINE: NEGATIVE
RBC # BLD: 4.41 M/UL
RBC # FLD: 13.1 %
SARS-COV-2 AB SERPL QL IA: 47.8 INDEX
SODIUM SERPL-SCNC: 141 MMOL/L
SPECIFIC GRAVITY URINE: 1.01
T3FREE SERPL-MCNC: 3 PG/ML
T4 FREE SERPL-MCNC: 1.3 NG/DL
TRIGL SERPL-MCNC: 70 MG/DL
TSH SERPL-ACNC: 1.14 UIU/ML
UROBILINOGEN URINE: NORMAL
WBC # FLD AUTO: 5.41 K/UL

## 2022-06-08 ENCOUNTER — APPOINTMENT (OUTPATIENT)
Dept: OTOLARYNGOLOGY | Facility: CLINIC | Age: 67
End: 2022-06-08
Payer: MEDICARE

## 2022-06-08 VITALS
BODY MASS INDEX: 26.61 KG/M2 | DIASTOLIC BLOOD PRESSURE: 86 MMHG | SYSTOLIC BLOOD PRESSURE: 125 MMHG | HEART RATE: 80 BPM | WEIGHT: 132 LBS | TEMPERATURE: 97.6 F | HEIGHT: 59 IN

## 2022-06-08 DIAGNOSIS — H90.3 SENSORINEURAL HEARING LOSS, BILATERAL: ICD-10-CM

## 2022-06-08 DIAGNOSIS — J34.2 DEVIATED NASAL SEPTUM: ICD-10-CM

## 2022-06-08 DIAGNOSIS — H93.13 TINNITUS, BILATERAL: ICD-10-CM

## 2022-06-08 DIAGNOSIS — R09.81 NASAL CONGESTION: ICD-10-CM

## 2022-06-08 PROCEDURE — 31231 NASAL ENDOSCOPY DX: CPT

## 2022-06-08 PROCEDURE — 99204 OFFICE O/P NEW MOD 45 MIN: CPT | Mod: 25

## 2022-06-08 PROCEDURE — 92557 COMPREHENSIVE HEARING TEST: CPT

## 2022-06-08 PROCEDURE — 92567 TYMPANOMETRY: CPT

## 2022-06-08 NOTE — CONSULT LETTER
[Dear  ___] : Dear  [unfilled], [Consult Letter:] : I had the pleasure of evaluating your patient, [unfilled]. [Please see my note below.] : Please see my note below. [Consult Closing:] : Thank you very much for allowing me to participate in the care of this patient.  If you have any questions, please do not hesitate to contact me. [Sincerely,] : Sincerely, [FreeTextEntry3] : Gale Johnston MD\par Otolaryngology and Cranial Base Surgery\par Attending Physician - Department of Otolaryngology and Head & Neck Surgery\par [facility]\par  - [employer] [facility]\par Office: [phone]\par Fax: [phone]\par

## 2022-06-08 NOTE — ASSESSMENT
[FreeTextEntry1] : Nasal congestion:\par - Start Flonase \par \par Tinnitus and SNHL:\par - asymmetry worse on left; may be related to trauma she had in past but will check an MRI to be sure no retrocochlear pathology\par - Audiogram today with HF SNHL\par - recommend HAE\par \par \par \par I personally saw and examined Ms. KATHLEEN HUDSON in detail this visit today. I personally reviewed the HPI, PMH, FH, SH, ROS and medications/allergies. I have spoken to JUANITA Wilkinson regarding the history and have personally determined the assessment and plan of care, and documented this myself. I was present and participated in all key portions of the encounter and all procedures noted above. I have made changes in the body of the note where appropriate.\par \par Attesting Faculty: See Attending Signature Below

## 2022-06-08 NOTE — HISTORY OF PRESENT ILLNESS
[de-identified] : 65 y/o F with b/l tinnitus and long term decreased hearing.  No pain.  Occasional popping on left, did have trauma to left ear in the past.  \par Also with DNS, occasional congestion. No sinus symptoms.

## 2022-06-08 NOTE — PROCEDURE
[FreeTextEntry6] : Flexible scope #28 was used. Right nasal passage with normal inferior, middle and superior turbinates. Nasal passage patent with clear middle meatus and sphenoethmoid recess. Left nasal passage with normal inferior, middle and superior turbinates. Nasal passage was patent with clear middle meatus and sphenoethmoid recess. No mucopurulence or polyps appreciated. Nasopharynx clear.  Mild DNS\par \par

## 2022-06-21 ENCOUNTER — APPOINTMENT (OUTPATIENT)
Dept: CARDIOLOGY | Facility: CLINIC | Age: 67
End: 2022-06-21
Payer: COMMERCIAL

## 2022-06-21 PROCEDURE — 93306 TTE W/DOPPLER COMPLETE: CPT

## 2022-06-22 ENCOUNTER — NON-APPOINTMENT (OUTPATIENT)
Age: 67
End: 2022-06-22

## 2022-06-23 ENCOUNTER — NON-APPOINTMENT (OUTPATIENT)
Age: 67
End: 2022-06-23

## 2022-09-07 ENCOUNTER — NON-APPOINTMENT (OUTPATIENT)
Age: 67
End: 2022-09-07

## 2022-09-15 ENCOUNTER — NON-APPOINTMENT (OUTPATIENT)
Age: 67
End: 2022-09-15

## 2022-11-01 ENCOUNTER — APPOINTMENT (OUTPATIENT)
Dept: MAMMOGRAPHY | Facility: IMAGING CENTER | Age: 67
End: 2022-11-01

## 2022-11-01 ENCOUNTER — APPOINTMENT (OUTPATIENT)
Dept: RADIOLOGY | Facility: IMAGING CENTER | Age: 67
End: 2022-11-01

## 2022-11-01 ENCOUNTER — RESULT REVIEW (OUTPATIENT)
Age: 67
End: 2022-11-01

## 2022-11-01 ENCOUNTER — APPOINTMENT (OUTPATIENT)
Dept: ULTRASOUND IMAGING | Facility: IMAGING CENTER | Age: 67
End: 2022-11-01

## 2022-11-01 ENCOUNTER — OUTPATIENT (OUTPATIENT)
Dept: OUTPATIENT SERVICES | Facility: HOSPITAL | Age: 67
LOS: 1 days | End: 2022-11-01
Payer: MEDICARE

## 2022-11-01 DIAGNOSIS — M85.89 OTHER SPECIFIED DISORDERS OF BONE DENSITY AND STRUCTURE, MULTIPLE SITES: ICD-10-CM

## 2022-11-01 DIAGNOSIS — R92.2 INCONCLUSIVE MAMMOGRAM: ICD-10-CM

## 2022-11-01 DIAGNOSIS — Z13.820 ENCOUNTER FOR SCREENING FOR OSTEOPOROSIS: ICD-10-CM

## 2022-11-01 DIAGNOSIS — Z00.00 ENCOUNTER FOR GENERAL ADULT MEDICAL EXAMINATION WITHOUT ABNORMAL FINDINGS: ICD-10-CM

## 2022-11-01 PROCEDURE — 77067 SCR MAMMO BI INCL CAD: CPT

## 2022-11-01 PROCEDURE — 77080 DXA BONE DENSITY AXIAL: CPT

## 2022-11-01 PROCEDURE — 77063 BREAST TOMOSYNTHESIS BI: CPT | Mod: 26

## 2022-11-01 PROCEDURE — 77080 DXA BONE DENSITY AXIAL: CPT | Mod: 26

## 2022-11-01 PROCEDURE — 76641 ULTRASOUND BREAST COMPLETE: CPT | Mod: 26,50

## 2022-11-01 PROCEDURE — 77067 SCR MAMMO BI INCL CAD: CPT | Mod: 26

## 2022-11-01 PROCEDURE — 77063 BREAST TOMOSYNTHESIS BI: CPT

## 2022-11-01 PROCEDURE — 76641 ULTRASOUND BREAST COMPLETE: CPT

## 2022-11-03 DIAGNOSIS — Z63.5 DISRUPTION OF FAMILY BY SEPARATION AND DIVORCE: ICD-10-CM

## 2022-11-03 SDOH — SOCIAL STABILITY - SOCIAL INSECURITY: DISRUPTION OF FAMILY BY SEPARATION AND DIVORCE: Z63.5

## 2022-11-07 ENCOUNTER — NON-APPOINTMENT (OUTPATIENT)
Age: 67
End: 2022-11-07

## 2022-12-01 ENCOUNTER — APPOINTMENT (OUTPATIENT)
Dept: INTERNAL MEDICINE | Facility: CLINIC | Age: 67
End: 2022-12-01

## 2022-12-01 PROCEDURE — 36415 COLL VENOUS BLD VENIPUNCTURE: CPT

## 2022-12-02 LAB
25(OH)D3 SERPL-MCNC: 34.6 NG/ML
ALBUMIN SERPL ELPH-MCNC: 4.2 G/DL
ALP BLD-CCNC: 76 U/L
ALT SERPL-CCNC: 9 U/L
ANION GAP SERPL CALC-SCNC: 11 MMOL/L
AST SERPL-CCNC: 20 U/L
BILIRUB SERPL-MCNC: 0.8 MG/DL
BUN SERPL-MCNC: 14 MG/DL
CALCIUM SERPL-MCNC: 9.6 MG/DL
CHLORIDE SERPL-SCNC: 106 MMOL/L
CO2 SERPL-SCNC: 25 MMOL/L
CREAT SERPL-MCNC: 0.75 MG/DL
EGFR: 88 ML/MIN/1.73M2
GLUCOSE SERPL-MCNC: 101 MG/DL
POTASSIUM SERPL-SCNC: 4.3 MMOL/L
PROT SERPL-MCNC: 7.2 G/DL
SODIUM SERPL-SCNC: 142 MMOL/L

## 2022-12-07 ENCOUNTER — APPOINTMENT (OUTPATIENT)
Dept: INTERNAL MEDICINE | Facility: CLINIC | Age: 67
End: 2022-12-07

## 2023-02-02 ENCOUNTER — APPOINTMENT (OUTPATIENT)
Dept: ORTHOPEDIC SURGERY | Facility: CLINIC | Age: 68
End: 2023-02-02
Payer: MEDICARE

## 2023-02-07 ENCOUNTER — APPOINTMENT (OUTPATIENT)
Dept: ORTHOPEDIC SURGERY | Facility: CLINIC | Age: 68
End: 2023-02-07
Payer: MEDICARE

## 2023-02-07 DIAGNOSIS — M19.049 PRIMARY OSTEOARTHRITIS, UNSPECIFIED HAND: ICD-10-CM

## 2023-02-07 PROCEDURE — 73130 X-RAY EXAM OF HAND: CPT | Mod: LT

## 2023-02-07 PROCEDURE — 99214 OFFICE O/P EST MOD 30 MIN: CPT | Mod: 57

## 2023-02-13 ENCOUNTER — NON-APPOINTMENT (OUTPATIENT)
Age: 68
End: 2023-02-13

## 2023-02-16 ENCOUNTER — NON-APPOINTMENT (OUTPATIENT)
Age: 68
End: 2023-02-16

## 2023-02-16 ENCOUNTER — APPOINTMENT (OUTPATIENT)
Dept: INTERNAL MEDICINE | Facility: CLINIC | Age: 68
End: 2023-02-16
Payer: MEDICARE

## 2023-02-16 VITALS
DIASTOLIC BLOOD PRESSURE: 80 MMHG | OXYGEN SATURATION: 98 % | WEIGHT: 138 LBS | HEART RATE: 83 BPM | BODY MASS INDEX: 27.82 KG/M2 | RESPIRATION RATE: 17 BRPM | HEIGHT: 59 IN | TEMPERATURE: 97.6 F | SYSTOLIC BLOOD PRESSURE: 122 MMHG

## 2023-02-16 DIAGNOSIS — M18.12 UNILATERAL PRIMARY OSTEOARTHRITIS OF FIRST CARPOMETACARPAL JOINT, LEFT HAND: ICD-10-CM

## 2023-02-16 DIAGNOSIS — Z01.818 ENCOUNTER FOR OTHER PREPROCEDURAL EXAMINATION: ICD-10-CM

## 2023-02-16 LAB — CRP SERPL-MCNC: 6 MG/L

## 2023-02-16 PROCEDURE — 99214 OFFICE O/P EST MOD 30 MIN: CPT | Mod: 25

## 2023-02-16 PROCEDURE — 93000 ELECTROCARDIOGRAM COMPLETE: CPT

## 2023-02-16 PROCEDURE — 36415 COLL VENOUS BLD VENIPUNCTURE: CPT

## 2023-02-16 RX ORDER — SULFAMETHOXAZOLE AND TRIMETHOPRIM 800; 160 MG/1; MG/1
800-160 TABLET ORAL TWICE DAILY
Qty: 14 | Refills: 0 | Status: COMPLETED | COMMUNITY
Start: 2020-12-31 | End: 2023-02-16

## 2023-02-16 RX ORDER — MELOXICAM 15 MG/1
15 TABLET ORAL
Qty: 30 | Refills: 2 | Status: COMPLETED | COMMUNITY
Start: 2022-01-11 | End: 2023-02-16

## 2023-02-16 RX ORDER — HYDROCORTISONE 10 MG/G
1 CREAM TOPICAL TWICE DAILY
Qty: 1 | Refills: 0 | Status: COMPLETED | COMMUNITY
Start: 2018-12-07 | End: 2023-02-16

## 2023-02-16 RX ORDER — ALENDRONATE SODIUM 70 MG/1
70 TABLET ORAL
Qty: 4 | Refills: 0 | Status: COMPLETED | COMMUNITY
Start: 2022-11-03 | End: 2023-02-16

## 2023-02-16 RX ORDER — LORATADINE 10 MG/1
10 TABLET ORAL
Qty: 30 | Refills: 3 | Status: COMPLETED | COMMUNITY
Start: 2018-12-31 | End: 2023-02-16

## 2023-02-16 RX ORDER — AMOXICILLIN AND CLAVULANATE POTASSIUM 875; 125 MG/1; MG/1
875-125 TABLET, COATED ORAL TWICE DAILY
Qty: 14 | Refills: 0 | Status: COMPLETED | COMMUNITY
Start: 2022-05-17 | End: 2023-02-16

## 2023-02-16 RX ORDER — FLUTICASONE PROPIONATE 50 UG/1
50 SPRAY, METERED NASAL TWICE DAILY
Qty: 2 | Refills: 3 | Status: COMPLETED | COMMUNITY
Start: 2022-06-08 | End: 2023-02-16

## 2023-02-16 NOTE — CONSULT LETTER
[Dear  ___] : Dear  [unfilled], [FreeTextEntry1] : I had the pleasure of seeing Ms. KATHLEEN BARROSO today for a preop medical clearance evaluation.\par \par Patient is medically stable and cleared for the proposed surgery.\par Please see my note below for details.\par If you should have any questions, please do not hesitate to contact me.\par \par Sincerely,\par \par Blanca Tan M.D.

## 2023-02-16 NOTE — REVIEW OF SYSTEMS
[Joint Pain] : joint pain [Joint Stiffness] : joint stiffness [Joint Swelling] : joint swelling [Negative] : Heme/Lymph [FreeTextEntry9] : stiffness and deformity of left thumb and left index finger at DIP joint

## 2023-02-16 NOTE — HISTORY OF PRESENT ILLNESS
[No Pertinent Cardiac History] : no history of aortic stenosis, atrial fibrillation, coronary artery disease, recent myocardial infarction, or implantable device/pacemaker [No Pertinent Pulmonary History] : no history of asthma, COPD, sleep apnea, or smoking [No Adverse Anesthesia Reaction] : no adverse anesthesia reaction in self or family member [(Patient denies any chest pain, claudication, dyspnea on exertion, orthopnea, palpitations or syncope)] : Patient denies any chest pain, claudication, dyspnea on exertion, orthopnea, palpitations or syncope [Chronic Anticoagulation] : no chronic anticoagulation [Chronic Kidney Disease] : no chronic kidney disease [Diabetes] : no diabetes [FreeTextEntry1] : Left Thumb and Index Finger/ IP Joint Fusion [FreeTextEntry2] : 03/06/2023 at [facility]  [FreeTextEntry3] : Dr. Karolina Liao

## 2023-02-16 NOTE — PHYSICAL EXAM
[No Acute Distress] : no acute distress [Well Nourished] : well nourished [Well Developed] : well developed [Well-Appearing] : well-appearing [Normal Sclera/Conjunctiva] : normal sclera/conjunctiva [PERRL] : pupils equal round and reactive to light [EOMI] : extraocular movements intact [Normal Outer Ear/Nose] : the outer ears and nose were normal in appearance [No JVD] : no jugular venous distention [No Lymphadenopathy] : no lymphadenopathy [Supple] : supple [Thyroid Normal, No Nodules] : the thyroid was normal and there were no nodules present [No Respiratory Distress] : no respiratory distress  [No Accessory Muscle Use] : no accessory muscle use [Clear to Auscultation] : lungs were clear to auscultation bilaterally [Normal Rate] : normal rate  [Regular Rhythm] : with a regular rhythm [Normal S1, S2] : normal S1 and S2 [No Murmur] : no murmur heard [No Carotid Bruits] : no carotid bruits [No Abdominal Bruit] : a ~M bruit was not heard ~T in the abdomen [No Varicosities] : no varicosities [Pedal Pulses Present] : the pedal pulses are present [No Edema] : there was no peripheral edema [No Palpable Aorta] : no palpable aorta [No Extremity Clubbing/Cyanosis] : no extremity clubbing/cyanosis [Normal Appearance] : normal in appearance [No Axillary Lymphadenopathy] : no axillary lymphadenopathy [Soft] : abdomen soft [Non Tender] : non-tender [Non-distended] : non-distended [No Masses] : no abdominal mass palpated [No HSM] : no HSM [Normal Bowel Sounds] : normal bowel sounds [No CVA Tenderness] : no CVA  tenderness [No Spinal Tenderness] : no spinal tenderness [Grossly Normal Strength/Tone] : grossly normal strength/tone [No Rash] : no rash [Coordination Grossly Intact] : coordination grossly intact [No Focal Deficits] : no focal deficits [Normal Gait] : normal gait [Deep Tendon Reflexes (DTR)] : deep tendon reflexes were 2+ and symmetric [Normal Affect] : the affect was normal [Normal Insight/Judgement] : insight and judgment were intact [de-identified] : nodules along distal left index finger with decreased ROM and deformity of left DIP joint and IP joint of left thumb

## 2023-02-17 ENCOUNTER — OUTPATIENT (OUTPATIENT)
Dept: OUTPATIENT SERVICES | Facility: HOSPITAL | Age: 68
LOS: 1 days | End: 2023-02-17
Payer: MEDICARE

## 2023-02-17 VITALS
RESPIRATION RATE: 18 BRPM | HEIGHT: 59 IN | OXYGEN SATURATION: 100 % | WEIGHT: 132.06 LBS | TEMPERATURE: 98 F | DIASTOLIC BLOOD PRESSURE: 84 MMHG | SYSTOLIC BLOOD PRESSURE: 125 MMHG | HEART RATE: 84 BPM

## 2023-02-17 DIAGNOSIS — Z98.890 OTHER SPECIFIED POSTPROCEDURAL STATES: Chronic | ICD-10-CM

## 2023-02-17 DIAGNOSIS — M19.042 PRIMARY OSTEOARTHRITIS, LEFT HAND: ICD-10-CM

## 2023-02-17 DIAGNOSIS — Z90.711 ACQUIRED ABSENCE OF UTERUS WITH REMAINING CERVICAL STUMP: Chronic | ICD-10-CM

## 2023-02-17 DIAGNOSIS — Z01.818 ENCOUNTER FOR OTHER PREPROCEDURAL EXAMINATION: ICD-10-CM

## 2023-02-17 PROCEDURE — G0463: CPT

## 2023-02-17 RX ORDER — LIDOCAINE HCL 20 MG/ML
0.2 VIAL (ML) INJECTION ONCE
Refills: 0 | Status: DISCONTINUED | OUTPATIENT
Start: 2023-03-06 | End: 2023-03-20

## 2023-02-17 RX ORDER — SODIUM CHLORIDE 9 MG/ML
1000 INJECTION, SOLUTION INTRAVENOUS
Refills: 0 | Status: DISCONTINUED | OUTPATIENT
Start: 2023-03-06 | End: 2023-03-20

## 2023-02-17 NOTE — H&P PST ADULT - NSICDXPASTMEDICALHX_GEN_ALL_CORE_FT
PAST MEDICAL HISTORY:  2019 novel coronavirus disease (COVID-19)     Primary osteoarthritis, left hand

## 2023-02-17 NOTE — H&P PST ADULT - NSICDXPASTSURGICALHX_GEN_ALL_CORE_FT
PAST SURGICAL HISTORY:  History of colonoscopy     History of D&C     History of nasal septoplasty     History of partial hysterectomy

## 2023-02-17 NOTE — H&P PST ADULT - NS PRO FEM  PAP SMEARS 3YRS
yes [PMH Reviewed and Updated] : past medical history reviewed and updated [PSH Reviewed and Updated] : past surgical history reviewed and updated [Family History Reviewed and Updated] : family history reviewed and updated [Medication and Allergies Reconciled] : medication and allergies reconciled [3] : 3 [Retired] : retired from work [Smoking Status Reviewed and Updated] : Smoking status was reviewed and updated [None] : The patient has no concerns about alcohol abuse [Less than 3 Times Per Week] : exercises less than 3 times per week [Adequate] : adequate [Friends] : friends [Children] : children [Fully Independent] : fully independent [Drives without concerns] : drives without concerns [No history of falls] : no history of falls [Seatbelts] : seatbelts [Safe Driving Habits] : safe driving habits [Smoke Detectors] : smoke detectors [Carbon Monoxide Detector] : carbon monoxide detector [Bathroom Grab Bars] : bathroom grab bars [Fall Prevention Measures] : fall prevention measures [Advanced Directives Discussed] : discussed at today's visit [Patient Has Full Capacity] : this patient has full decision making capacity for discussion of advance care planning [Patient Given Health Care Proxy Information] : patient was given Health Care Proxy information [Over the Past 2 Weeks, Have You Felt Down, Depressed, or Hopeless?] : 1.) Over the past 2 weeks, have you felt down, depressed, or hopeless? No [Over the Past 2 Weeks, Have You Felt Little Interest or Pleasure Doing Things?] : 2.) Over the past 2 weeks, have you felt little interest or pleasure doing things? No [FreeTextEntry1] : Pt presents for her annual physical as well as f/u for Afib, HTN, hyperlipidemia, and obesity.\par She is feeling well.\par NO acute complaints.\par \par Still with knee pain, and contemplating TKR, but has not made a decision yet.\par \par Has been taking furosmide 20 mg bid for about two weeks per patient, as well as K+ supplement.\par \par Feels as if her memory is not quite as good as it was. Forgetful.\par Still driving without issue.\par

## 2023-02-17 NOTE — H&P PST ADULT - CONSTITUTIONAL
I have personally evaluated and examined the patient. The Attending was available to me as a supervising provider if needed. normal/well-groomed/no distress

## 2023-02-17 NOTE — H&P PST ADULT - HISTORY OF PRESENT ILLNESS
67 year old left-handed female with advanced left thumb IP joint arthritis and left index finger DIP arthritis, mild left basal joint arthritis and presents to [facility] today for a scheduled Left Thumb and Index Finger Interphalangeal Joint Fusion on 3/6/2023. No significant PMH. Denies recent fevers, chills, cough, chest pain or SOB and feels well otherwise. COVID testing scheduled at [facility] on 3/3/23.

## 2023-02-17 NOTE — H&P PST ADULT - NSSUBSTANCEUSE_GEN_ALL_CORE_SD
Heidy Vega is here today for Office Visit and Follow-up (Nail discoloration)    Concerns/symptoms:   Medications: medications verified, no change  Refills needed today? No     Tobacco history: verified  Advanced Directives: No not on file, not interested.  BP greater than 140/90? No    Patient would like communication of their results via:    Home Phone: [phone] (home)  Okay to leave a message containing results? Yes  Preferred language:  Citizen of [place].    Health Maintenance Due   Topic Date Due   • Shingles Vaccine (1 of 2) 05/04/2003   • Breast Cancer Screening  11/24/2019   • Medicare Wellness Visit  08/06/2021        Medicare HRA:              PHQ 2:  Date Adult PHQ 2 Score Adult PHQ 2 Interpretation   2/8/2021 0 No further screening needed       PHQ 9:  Date Adult PHQ 9 Score Adult PHQ 9 Interpretation   6/26/2019 11 Moderate Depression      never used

## 2023-02-17 NOTE — H&P PST ADULT - MUSCULOSKELETAL
details… left thumb and index finger/decreased ROM due to pain/normal gait/strength 5/5 bilateral upper extremities/strength 5/5 bilateral lower extremities

## 2023-02-17 NOTE — H&P PST ADULT - PROBLEM SELECTOR PLAN 1
Scheduled for sx on 3/6/23. Surgical and chlorhexidine instructions reviewed w/ pt. COVID testing scheduled at [facility] on 3/3/23. Med marta received, lab results in chart.

## 2023-02-17 NOTE — H&P PST ADULT - NSHP PST SURGERY DATE_DT_GEN_A_CORE
Questioned and couldn't confirm dose reports he was not at home . Reports taking  6 mg 3/22.  On & off nose bleeding for the past two days.  Denies any new medication, greens ,diet changes or etc  
06-Mar-2023

## 2023-02-20 LAB
ALBUMIN SERPL ELPH-MCNC: 4.4 G/DL
ALP BLD-CCNC: 76 U/L
ALT SERPL-CCNC: 10 U/L
ANION GAP SERPL CALC-SCNC: 12 MMOL/L
AST SERPL-CCNC: 17 U/L
BASOPHILS # BLD AUTO: 0.03 K/UL
BASOPHILS NFR BLD AUTO: 0.7 %
BILIRUB SERPL-MCNC: 0.8 MG/DL
BUN SERPL-MCNC: 12 MG/DL
CALCIUM SERPL-MCNC: 9.6 MG/DL
CHLORIDE SERPL-SCNC: 106 MMOL/L
CHOLEST SERPL-MCNC: 206 MG/DL
CO2 SERPL-SCNC: 24 MMOL/L
CREAT SERPL-MCNC: 0.7 MG/DL
EGFR: 95 ML/MIN/1.73M2
EOSINOPHIL # BLD AUTO: 0.22 K/UL
EOSINOPHIL NFR BLD AUTO: 5.3 %
ERYTHROCYTE [SEDIMENTATION RATE] IN BLOOD BY WESTERGREN METHOD: 43 MM/HR
ESTIMATED AVERAGE GLUCOSE: 114 MG/DL
GLUCOSE SERPL-MCNC: 95 MG/DL
HBA1C MFR BLD HPLC: 5.6 %
HCT VFR BLD CALC: 42.1 %
HDLC SERPL-MCNC: 65 MG/DL
HGB BLD-MCNC: 13.4 G/DL
IMM GRANULOCYTES NFR BLD AUTO: 0.2 %
LDLC SERPL CALC-MCNC: 128 MG/DL
LYMPHOCYTES # BLD AUTO: 1.3 K/UL
LYMPHOCYTES NFR BLD AUTO: 31.2 %
MAN DIFF?: NORMAL
MCHC RBC-ENTMCNC: 29.6 PG
MCHC RBC-ENTMCNC: 31.8 GM/DL
MCV RBC AUTO: 93.1 FL
MONOCYTES # BLD AUTO: 0.29 K/UL
MONOCYTES NFR BLD AUTO: 7 %
NEUTROPHILS # BLD AUTO: 2.32 K/UL
NEUTROPHILS NFR BLD AUTO: 55.6 %
NONHDLC SERPL-MCNC: 140 MG/DL
PLATELET # BLD AUTO: 226 K/UL
POTASSIUM SERPL-SCNC: 4.2 MMOL/L
PROT SERPL-MCNC: 6.7 G/DL
RBC # BLD: 4.52 M/UL
RBC # FLD: 13.3 %
SODIUM SERPL-SCNC: 143 MMOL/L
T3FREE SERPL-MCNC: 2.88 PG/ML
T4 FREE SERPL-MCNC: 1.4 NG/DL
TRIGL SERPL-MCNC: 63 MG/DL
TSH SERPL-ACNC: 1.06 UIU/ML
WBC # FLD AUTO: 4.17 K/UL

## 2023-03-03 ENCOUNTER — OUTPATIENT (OUTPATIENT)
Dept: OUTPATIENT SERVICES | Facility: HOSPITAL | Age: 68
LOS: 1 days | End: 2023-03-03
Payer: MEDICARE

## 2023-03-03 DIAGNOSIS — Z98.890 OTHER SPECIFIED POSTPROCEDURAL STATES: Chronic | ICD-10-CM

## 2023-03-03 DIAGNOSIS — Z11.52 ENCOUNTER FOR SCREENING FOR COVID-19: ICD-10-CM

## 2023-03-03 DIAGNOSIS — Z90.711 ACQUIRED ABSENCE OF UTERUS WITH REMAINING CERVICAL STUMP: Chronic | ICD-10-CM

## 2023-03-03 PROCEDURE — U0003: CPT

## 2023-03-03 PROCEDURE — C9803: CPT

## 2023-03-03 PROCEDURE — U0005: CPT

## 2023-03-04 LAB — SARS-COV-2 RNA SPEC QL NAA+PROBE: SIGNIFICANT CHANGE UP

## 2023-03-05 ENCOUNTER — TRANSCRIPTION ENCOUNTER (OUTPATIENT)
Age: 68
End: 2023-03-05

## 2023-03-06 ENCOUNTER — TRANSCRIPTION ENCOUNTER (OUTPATIENT)
Age: 68
End: 2023-03-06

## 2023-03-06 ENCOUNTER — OUTPATIENT (OUTPATIENT)
Dept: OUTPATIENT SERVICES | Facility: HOSPITAL | Age: 68
LOS: 1 days | End: 2023-03-06
Payer: MEDICARE

## 2023-03-06 ENCOUNTER — APPOINTMENT (OUTPATIENT)
Dept: ORTHOPEDIC SURGERY | Facility: HOSPITAL | Age: 68
End: 2023-03-06

## 2023-03-06 VITALS
WEIGHT: 132.06 LBS | OXYGEN SATURATION: 99 % | DIASTOLIC BLOOD PRESSURE: 72 MMHG | RESPIRATION RATE: 16 BRPM | HEIGHT: 59 IN | TEMPERATURE: 97 F | SYSTOLIC BLOOD PRESSURE: 138 MMHG | HEART RATE: 82 BPM

## 2023-03-06 VITALS
RESPIRATION RATE: 17 BRPM | SYSTOLIC BLOOD PRESSURE: 104 MMHG | DIASTOLIC BLOOD PRESSURE: 57 MMHG | TEMPERATURE: 98 F | OXYGEN SATURATION: 98 % | HEART RATE: 75 BPM

## 2023-03-06 DIAGNOSIS — Z98.890 OTHER SPECIFIED POSTPROCEDURAL STATES: Chronic | ICD-10-CM

## 2023-03-06 DIAGNOSIS — M19.042 PRIMARY OSTEOARTHRITIS, LEFT HAND: ICD-10-CM

## 2023-03-06 DIAGNOSIS — Z90.711 ACQUIRED ABSENCE OF UTERUS WITH REMAINING CERVICAL STUMP: Chronic | ICD-10-CM

## 2023-03-06 PROCEDURE — 26860 FUSION OF FINGER JOINT: CPT | Mod: F1,59

## 2023-03-06 PROCEDURE — C1713: CPT

## 2023-03-06 PROCEDURE — 26861 FUSION OF FINGER JNT ADD-ON: CPT | Mod: FA

## 2023-03-06 PROCEDURE — 76000 FLUOROSCOPY <1 HR PHYS/QHP: CPT

## 2023-03-06 PROCEDURE — 26860 FUSION OF FINGER JOINT: CPT | Mod: F1

## 2023-03-06 DEVICE — K-WIRE MEDARTIS (SMOOTH) DOUBLE TROCAR 0.8MM X 100MM: Type: IMPLANTABLE DEVICE | Site: LEFT | Status: FUNCTIONAL

## 2023-03-06 DEVICE — K-WIRE MEDARTIS (SMOOTH) SINGLE TROCAR 0.8MM X 100MM: Type: IMPLANTABLE DEVICE | Site: LEFT | Status: FUNCTIONAL

## 2023-03-06 DEVICE — SCREW CANN COMPR 2.2X22MM: Type: IMPLANTABLE DEVICE | Site: LEFT | Status: FUNCTIONAL

## 2023-03-06 DEVICE — IMPLANTABLE DEVICE: Type: IMPLANTABLE DEVICE | Site: LEFT | Status: FUNCTIONAL

## 2023-03-06 RX ORDER — CHLORHEXIDINE GLUCONATE 213 G/1000ML
1 SOLUTION TOPICAL ONCE
Refills: 0 | Status: COMPLETED | OUTPATIENT
Start: 2023-03-06 | End: 2023-03-06

## 2023-03-06 RX ORDER — FENTANYL CITRATE 50 UG/ML
25 INJECTION INTRAVENOUS
Refills: 0 | Status: DISCONTINUED | OUTPATIENT
Start: 2023-03-06 | End: 2023-03-06

## 2023-03-06 RX ORDER — OXYCODONE HYDROCHLORIDE 5 MG/1
5 TABLET ORAL ONCE
Refills: 0 | Status: DISCONTINUED | OUTPATIENT
Start: 2023-03-06 | End: 2023-03-06

## 2023-03-06 RX ORDER — ONDANSETRON 8 MG/1
4 TABLET, FILM COATED ORAL ONCE
Refills: 0 | Status: DISCONTINUED | OUTPATIENT
Start: 2023-03-06 | End: 2023-03-20

## 2023-03-06 RX ADMIN — SODIUM CHLORIDE 100 MILLILITER(S): 9 INJECTION, SOLUTION INTRAVENOUS at 08:25

## 2023-03-06 RX ADMIN — CHLORHEXIDINE GLUCONATE 1 APPLICATION(S): 213 SOLUTION TOPICAL at 08:25

## 2023-03-06 NOTE — ASU PATIENT PROFILE, ADULT - FALL HARM RISK - CONCLUSION
[facility]   [address], [address]  [place] [address]  Phone: [phone]  Fax: [phone]              Shamar Morgan  1943    Encounter Date: 1/4/2018    Garrett Mayberry M.D.          PROGRESS NOTE:  Subjective:   Shamar Morgan is a 74 y.o. male who presents today being seen in consul;t on request of Dr Mcallister secondary to asymptomatic atrial fib. Seen 18 % on zio. No chest pain oir SOB. Occassional dizziness on standing. On Plavix and ASA. No syncope or presyncope.    Past Medical History:   Diagnosis Date   • CAD (coronary artery disease) 4/16/2012   • HTN (hypertension) 4/16/2012   • Hyperglycemia 4/16/2012   • Hyperlipemia 4/16/2012   • Hypertension    • Obesity    • Tachycardia 4/16/2012   • Tonsillitis      Past Surgical History:   Procedure Laterality Date   • CARDIAC CATH, LEFT HEART  09/03/2017    successful thrombectomy  and CHELLE to ostial proximal RCA    • CARPAL TUNNEL RELEASE       Family History   Problem Relation Age of Onset   • Diabetes Mother    • Heart Disease Mother    • Kidney Disease Mother    • Diabetes Sister      History   Smoking Status   • Former Smoker   • Packs/day: 3.00   • Years: 16.00   • Types: Cigarettes   • Quit date: 1/1/1980   Smokeless Tobacco   • Never Used     Allergies   Allergen Reactions   • Lipidil [Fenofibrate]      Myalgia   • Penicillins    • Sulfa Drugs      Outpatient Encounter Prescriptions as of 1/4/2018   Medication Sig Dispense Refill   • losartan (COZAAR) 100 MG Tab Take 0.5 Tabs by mouth every day. 90 Each 3   • amiodarone (CORDARONE) 200 MG Tab Take 0.5 Tabs by mouth every day. 30 Tab 2   • rivaroxaban (XARELTO) 20 MG Tab tablet Take 1 Tab by mouth with dinner. 90 Tab 3   • metoprolol (LOPRESSOR) 25 MG Tab Take 0.5 Tabs by mouth 2 Times a Day. 60 Tab 11   • atorvastatin (LIPITOR) 40 MG Tab Take 1 Tab by mouth every day. 30 Tab 6   • clopidogrel (PLAVIX) 75 MG Tab Take 4 tablets on day 1, then 1 tablet  "daily 90 Tab 3   • vitamin D (CHOLECALCIFEROL) 1000 UNIT TABS Take 1,000 Units by mouth every day.     • [DISCONTINUED] amiodarone (CORDARONE) 200 MG Tab Take 1 Tab by mouth every day. 30 Tab 2   • [DISCONTINUED] aspirin (ASA) 81 MG Chew Tab chewable tablet Take 1 Tab by mouth every day. 100 Tab 3   • [DISCONTINUED] losartan (COZAAR) 100 MG Tab TAKE ONE TABLET BY MOUTH ONCE DAILY 90 Each 3     No facility-administered encounter medications on file as of 1/4/2018.      Review of Systems   Constitutional: Negative for chills and fever.   HENT: Negative for congestion.    Eyes: Negative for blurred vision, pain and discharge.   Respiratory: Negative for cough, hemoptysis and wheezing.    Cardiovascular: Negative for chest pain and palpitations.   Gastrointestinal: Negative for abdominal pain, nausea and vomiting.   Musculoskeletal: Negative for joint pain and myalgias.   Skin: Negative for itching and rash.   Neurological: Negative for speech change and loss of consciousness.   Endo/Heme/Allergies: Does not bruise/bleed easily.   Psychiatric/Behavioral: Negative for depression. The patient is not nervous/anxious.    All other systems reviewed and are negative.       Objective:   /60   Pulse (!) 50   Ht 1.702 m (5' 7.01\")   Wt 95.3 kg (210 lb)   SpO2 95%   BMI 32.88 kg/m²      Physical Exam   Constitutional: He is oriented to person, place, and time. He appears well-developed. No distress.   HENT:   Mouth/Throat: Mucous membranes are normal.   Eyes: Conjunctivae and EOM are normal.   Neck: No JVD present. No tracheal deviation present. No thyroid mass and no thyromegaly present.   Cardiovascular: Normal rate, regular rhythm and intact distal pulses.    No murmur heard.  Pulmonary/Chest: Effort normal and breath sounds normal. No respiratory distress. He exhibits no tenderness.   Abdominal: Soft. There is no tenderness.   Musculoskeletal: Normal range of motion. He exhibits no edema.   Neurological: He is " alert and oriented to person, place, and time. He has normal strength. He displays no tremor.   Skin: Skin is warm and dry. He is not diaphoretic.   Psychiatric: He has a normal mood and affect. His behavior is normal.   Vitals reviewed.      Assessment:     1. Atrial fibrillation, unspecified type (CMS-HCC)  EKG    TSH+FREE T4   2. PAF (paroxysmal atrial fibrillation) (CMS-HCC)  amiodarone (CORDARONE) 200 MG Tab    TSH+FREE T4   3. Acute MI, inferior wall, subsequent episode of care (CMS-HCC)     4. Dyslipidemia     5. Paroxysmal atrial fibrillation (CMS-HCC)     6. Stented coronary artery     7. Non morbid obesity due to excess calories     8. Essential hypertension         Medical Decision Making:  Today's Assessment / Status / Plan:   1. Anticoagulation discussed staring NOAC and DC ASA. Continue with plavix. Discussed with Ary regarding his sten and he is ok with this change.  2. PAF asymptomatic check TFT's. Cut amio to 100 mg. No change in AA meds.  3. Orthostatic hypotension cut cozaar to 50 mg.  4. F/U with Dr Mcallister.      Aldo Lemus M.D.  [address]  [address]  [place] [address]  VIA In Basket     Rodrigue Mcallister M.D.  [address]  [address]  [place] [address]  VIA In Basket                  Universal Safety Interventions

## 2023-03-06 NOTE — ASU DISCHARGE PLAN (ADULT/PEDIATRIC) - NURSING INSTRUCTIONS
OK to take Tylenol/Acetaminophen at ANY TIME TODAY 3/6  for pain and every 6 hours after as needed. OK to take Motrin/Ibuprofen at 5pm TODAY 3/6 (last dose @  11AM   in operating room) for pain and every 6 hours after as needed.

## 2023-03-06 NOTE — ASU PATIENT PROFILE, ADULT - NSSUBSTANCEUSE_GEN_ALL_CORE_SD
FOLLOW UP VISIT - BARIATRIC SURGERY  Siria Newberry 47 y o  female MRN: [id number]  Unit/Bed#:  Encounter: [id number]      HPI:  Karen Hoover is a 47 y o  female who presents with a history of gastric bypass in   Here to review results of her endoscopy  Review of Systems    Historical Information   Past Medical History:   Diagnosis Date    Acid reflux     Anxiety     Asthma     Bipolar 1 disorder ([address] )     Bulging lumbar disc     Cholelithiasis     removal today 2016    Chronic pain disorder     lower back    Constipation     Depression     Disease of thyroid gland     H/O gastric bypass         H/O renal calculi     Hypertension     Obesity     PONV (postoperative nausea and vomiting)     RLS (restless legs syndrome)     Seasonal allergies     Sleep apnea     No CPAP    Type 2 diabetes mellitus (HCC)     Wound, open, hip or thigh     right thigh from recent surgery 16- bladder sling/ small slit opening- doesnt appear to be draining     Past Surgical History:   Procedure Laterality Date     SECTION      CHOLECYSTECTOMY      ESOPHAGOGASTRODUODENOSCOPY N/A 2016    Procedure: ESOPHAGOGASTRODUODENOSCOPY (EGD); Surgeon: Liat Banuelos MD;  Location: [facility];  Service:    [facility]      recent 16    JOINT REPLACEMENT      bilateral TKR    KIDNEY STONE SURGERY      LUMBAR FUSION      x2     VT EGD TRANSORAL BIOPSY SINGLE/MULTIPLE N/A 2016    Procedure: ESOPHAGOGASTRODUODENOSCOPY (EGD); Surgeon: Liat Banuelos MD;  Location: [facility]; Service: Bariatrics    VT EGD TRANSORAL BIOPSY SINGLE/MULTIPLE N/A 2017    Procedure: ESOPHAGOGASTRODUODENOSCOPY (EGD) wtih bx;  Surgeon: Lait Banuelos MD;  Location: [facility]; Service: Bariatrics    VT EGD TRANSORAL BIOPSY SINGLE/MULTIPLE N/A 2018    Procedure: ESOPHAGOGASTRODUODENOSCOPY (EGD) with bx;  Surgeon: Liat Banuelos MD;  Location: [facility];   Service: Bariatrics    NJ LAP,CHOLECYSTECTOMY N/A 5/17/2016    Procedure: CHOLECYSTECTOMY LAPAROSCOPIC;  Surgeon: Anton Rubio MD;  Location: [facility];  Service: Bariatrics    ALEXIS-EN-Y PROCEDURE      2012    SPINAL CORD STIMULATOR IMPLANT       Social History   History   Alcohol Use No     History   Drug Use No     History   Smoking Status    Never Smoker   Smokeless Tobacco    Never Used     Family History: non-contributory    Meds/Allergies   all medications and allergies reviewed  Allergies   Allergen Reactions    Trospium Rash     Facial edema, redness, dry mouth    Capsaicin Rash    Demerol [Meperidine] Rash       Objective       Current Vitals:   Blood Pressure: 122/80 (06/14/18 1126)  Pulse: 74 (06/14/18 1126)  Temperature: 98 °F (36 7 °C) (06/14/18 1126)  Respirations: 18 (06/14/18 1126)  Height: 4' 11" (149 9 cm) (06/14/18 1126)  Weight - Scale: 65 8 kg (145 lb) (06/14/18 1126)        Invasive Devices          No matching active lines, drains, or airways          Physical Exam   Constitutional: She is oriented to person, place, and time  She appears well-developed and well-nourished  No distress  Abdominal: Soft  Bowel sounds are normal  She exhibits no distension and no mass  There is no tenderness  There is no rebound and no guarding  Neurological: She is alert and oriented to person, place, and time  Psychiatric: She has a normal mood and affect  Her behavior is normal  Judgment and thought content normal    Vitals reviewed  Lab Results: I have personally reviewed pertinent lab results  Imaging: I have personally reviewed pertinent reports  EKG, Pathology, and Other Studies: I have personally reviewed pertinent reports  Her endoscopy revealed pouchitis and biopsies   Gastric biopsy:  - Gastric oxyntic-type mucosa showing no chronic gastritis, acute/active gastritis, nor other abnormalities   - NEGATIVE for H pylori on IHC stain  Assessment/PLAN:    The patient is a 47 y o  female  status post Osmar-en-y gastric byppsss surgery in 2/13/12  and has had laparoscopic cholecystectomy 5/17/16  Had an endoscopy last October that revealed a normal anatomy  Her symptomatology of epigastric pain and dysphagia has recurred over the last 2 or 3 months and she was scheduled to have an endoscopy to evaluate the anatomy of her gastric bypass  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  No evidence of H pylori  More importantly there was no evidence of a stricture, marginal ulceration, or gastro gastric fistula      I have increased her PPI to twice a day and she is taking Carafate now  I have told her that this should improve with time and she will follow up with us as scheduled    I had a detailed discussion with her stressing the fact that long-term success is largely dependent on compliance and abidance to the recommendations of the program as well as participation within the support groups  She is committed to continue to observe her diet and to increase her physical activity          Carlos Love MD  6/14/2018  11:38 AM        never used

## 2023-03-06 NOTE — PRE-ANESTHESIA EVALUATION ADULT - HEIGHT IN INCHES
[FreeTextEntry1] : Very pleasant 69-year-old gentleman with past medical history of hyperlipidemia who presents with elevated PSA to 6.04.  Patient also reports an elevated PSA in January, 2022, also last year that was elevated to 4.  Patient also endorsing slow stream, nocturia x1-2, feels that the symptoms are bothersome.  Patient denies gross hematuria, dysuria, urgency, daytime frequency, fevers, chills, nausea, vomiting.  No history of smoking, drinking, illicit drug use.  Colonoscopy last February, 2022, no recent urinary tract infections.  Patient's brother recently with elevated PSA, suspecting prostate cancer.  Father had prostatectomy, unknown sure if due to cancer. 11

## 2023-03-06 NOTE — ASU PATIENT PROFILE, ADULT - FALL HARM RISK - UNIVERSAL INTERVENTIONS
Bed in lowest position, wheels locked, appropriate side rails in place/Call bell, personal items and telephone in reach/Instruct patient to call for assistance before getting out of bed or chair/Non-slip footwear when patient is out of bed/[place] to call system/Purposeful Proactive Rounding/Room/bathroom lighting operational, light cord in reach

## 2023-03-06 NOTE — ASU PREOP CHECKLIST -  HIBICLENS SHOWER 2 DATE
Spoke with patient for COVID 19 screening secondary to upcoming appointment tomorrow . At this time patient denies symptoms, recent (previous 14 days) positive covid test, recent travel and exposure. Patient will report to [facility] at scheduled time. Patient educated to call physician or go to ER with respiratory symptoms or fever and to not present to appointment if symptomatic. Will coordinate for next appointment accordingly.      Andrea Malone, PharmD, 10/27/2020 3:52 PM 05-Mar-2023

## 2023-03-06 NOTE — ASU DISCHARGE PLAN (ADULT/PEDIATRIC) - NS MD DC FALL RISK RISK
For information on Fall & Injury Prevention, visit: [email or web address] OR  [email or web address] OR  https://www.cdc.gov/steadi/patient.html

## 2023-03-13 ENCOUNTER — APPOINTMENT (OUTPATIENT)
Dept: ORTHOPEDIC SURGERY | Facility: CLINIC | Age: 68
End: 2023-03-13
Payer: MEDICARE

## 2023-03-13 VITALS
OXYGEN SATURATION: 98 % | BODY MASS INDEX: 26.61 KG/M2 | HEART RATE: 75 BPM | TEMPERATURE: 97.4 F | HEIGHT: 59 IN | WEIGHT: 132 LBS

## 2023-03-13 PROBLEM — U07.1 COVID-19: Chronic | Status: ACTIVE | Noted: 2023-02-17

## 2023-03-13 PROBLEM — M19.042 PRIMARY OSTEOARTHRITIS, LEFT HAND: Chronic | Status: ACTIVE | Noted: 2023-02-17

## 2023-03-13 PROCEDURE — 73140 X-RAY EXAM OF FINGER(S): CPT | Mod: F1,FA

## 2023-03-13 PROCEDURE — 99024 POSTOP FOLLOW-UP VISIT: CPT

## 2023-03-23 ENCOUNTER — APPOINTMENT (OUTPATIENT)
Dept: ORTHOPEDIC SURGERY | Facility: CLINIC | Age: 68
End: 2023-03-23
Payer: MEDICARE

## 2023-03-23 PROCEDURE — 29085 APPL CAST HAND&LWR FOREARM: CPT | Mod: LT

## 2023-03-23 PROCEDURE — 99024 POSTOP FOLLOW-UP VISIT: CPT

## 2023-03-23 PROCEDURE — 73130 X-RAY EXAM OF HAND: CPT | Mod: LT

## 2023-03-27 ENCOUNTER — APPOINTMENT (OUTPATIENT)
Dept: ORTHOPEDIC SURGERY | Facility: CLINIC | Age: 68
End: 2023-03-27

## 2023-03-31 ENCOUNTER — APPOINTMENT (OUTPATIENT)
Dept: ORTHOPEDIC SURGERY | Facility: CLINIC | Age: 68
End: 2023-03-31
Payer: MEDICARE

## 2023-03-31 PROCEDURE — 73130 X-RAY EXAM OF HAND: CPT | Mod: LT

## 2023-03-31 PROCEDURE — 99024 POSTOP FOLLOW-UP VISIT: CPT

## 2023-04-20 ENCOUNTER — APPOINTMENT (OUTPATIENT)
Dept: ORTHOPEDIC SURGERY | Facility: CLINIC | Age: 68
End: 2023-04-20
Payer: MEDICARE

## 2023-04-20 PROCEDURE — 73130 X-RAY EXAM OF HAND: CPT | Mod: LT

## 2023-04-20 PROCEDURE — 99024 POSTOP FOLLOW-UP VISIT: CPT

## 2023-05-30 ENCOUNTER — TRANSCRIPTION ENCOUNTER (OUTPATIENT)
Age: 68
End: 2023-05-30

## 2023-05-30 ENCOUNTER — INPATIENT (INPATIENT)
Facility: HOSPITAL | Age: 68
LOS: 1 days | Discharge: NURSING, REHAB OR LONG-TERM CARE | End: 2023-06-01
Attending: ORTHOPAEDIC SURGERY | Admitting: ORTHOPAEDIC SURGERY
Payer: MEDICARE

## 2023-05-30 VITALS
DIASTOLIC BLOOD PRESSURE: 78 MMHG | HEART RATE: 80 BPM | SYSTOLIC BLOOD PRESSURE: 145 MMHG | TEMPERATURE: 98 F | OXYGEN SATURATION: 98 % | RESPIRATION RATE: 18 BRPM

## 2023-05-30 DIAGNOSIS — Z90.711 ACQUIRED ABSENCE OF UTERUS WITH REMAINING CERVICAL STUMP: Chronic | ICD-10-CM

## 2023-05-30 DIAGNOSIS — Z98.890 OTHER SPECIFIED POSTPROCEDURAL STATES: Chronic | ICD-10-CM

## 2023-05-30 PROCEDURE — 71045 X-RAY EXAM CHEST 1 VIEW: CPT | Mod: 26

## 2023-05-30 PROCEDURE — 73552 X-RAY EXAM OF FEMUR 2/>: CPT | Mod: 26,RT

## 2023-05-30 PROCEDURE — 73502 X-RAY EXAM HIP UNI 2-3 VIEWS: CPT | Mod: 26,RT

## 2023-05-30 PROCEDURE — 99285 EMERGENCY DEPT VISIT HI MDM: CPT | Mod: GC

## 2023-05-30 RX ORDER — SODIUM CHLORIDE 9 MG/ML
1000 INJECTION INTRAMUSCULAR; INTRAVENOUS; SUBCUTANEOUS ONCE
Refills: 0 | Status: COMPLETED | OUTPATIENT
Start: 2023-05-30 | End: 2023-05-30

## 2023-05-30 RX ORDER — MORPHINE SULFATE 50 MG/1
4 CAPSULE, EXTENDED RELEASE ORAL ONCE
Refills: 0 | Status: DISCONTINUED | OUTPATIENT
Start: 2023-05-30 | End: 2023-05-30

## 2023-05-30 RX ADMIN — SODIUM CHLORIDE 1000 MILLILITER(S): 9 INJECTION INTRAMUSCULAR; INTRAVENOUS; SUBCUTANEOUS at 23:00

## 2023-05-30 RX ADMIN — MORPHINE SULFATE 4 MILLIGRAM(S): 50 CAPSULE, EXTENDED RELEASE ORAL at 23:58

## 2023-05-30 RX ADMIN — MORPHINE SULFATE 4 MILLIGRAM(S): 50 CAPSULE, EXTENDED RELEASE ORAL at 23:00

## 2023-05-30 NOTE — ED ADULT TRIAGE NOTE - CHIEF COMPLAINT QUOTE
s/p mechanical fall: c/o R hip pain, RLE appears abducted/shortened, +sensation /movement, unable to ambulate- denies pmhx

## 2023-05-30 NOTE — ED PROVIDER NOTE - ATTENDING CONTRIBUTION TO CARE
The patient is a 67y Female who no pertinent past medical or surgical history PTED s/p mechanical fall as described. In USOH prior to event   Vital Signs Last 24 Hrs  T(F): 98.4 HR: 65 BP: 138/67 RR: 16 SpO2: 96% (31 May 2023 13:20) (95% - 98%)  PE as documented   DATA:  EKG: Pending  RESULTS: All significant/relevant labs and imaging results present during the time of evaluation have been entered into chart through pullset function and are discussed below    MDM:  IMPRESSION: Obvious fracture of RLE   Considerations; Xrays for location/associate fractures preop labs analgesics IV/maintenance fluids NPO Ortho consult     PLAN  As above  Reassess  TBA

## 2023-05-30 NOTE — ED PROVIDER NOTE - NS ED ROS FT
GENERAL: No fever, chills  EYES: no vision changes, no discharge.   ENT: no difficulty swallowing or speaking   CARDIAC: no chest pain/pressure, SOB, lower extremity swelling  PULMONARY: no cough, SOB  GI: no abdominal pain, n/v/d  : no dysuria  SKIN: no rashes  NEURO: no headache, lightheadedness, paresthesia  MSK: +R hip pain

## 2023-05-30 NOTE — ED PROVIDER NOTE - PHYSICAL EXAMINATION
Dara Liriano MD  GENERAL: Patient awake alert NAD.  HEENT: NC/AT, Moist mucous membranes, EOMI.  LUNGS: CTAB, no wheezes or crackles.   CARDIAC: RRR, no m/r/g.    ABDOMEN: Soft, NT, ND, No rebound, guarding.   EXT: No edema. No calf tenderness. CV 2+DP/PT bilaterally.   R leg shortened and externally rotated. pulse intact. well perfused. unable to move 2/2 R hip pain  MSK: No spinal tenderness, no pain with movement, no deformities.  NEURO: A&Ox3. Moving all extremities.  SKIN: Warm and dry. No rash.  PSYCH: Normal affect.

## 2023-05-30 NOTE — ED PROVIDER NOTE - OBJECTIVE STATEMENT
Patient is a 67-year-old female with no other medical problems who presents to the ED after mechanical trip and fall with right hip pain.  She states that she had no prodromal syndrome before the fall and denies any chest pain, palpitations, shortness of breath, dizziness, lightheadedness.  She fell onto her right side and was unable to get up secondary to right hip pain.  She denies any other bony or MSK pain.  She denies any headache or hitting her head, changes in vision, difficulty with speech.  She is not on any blood thinners.  She denies any weakness or numbness in her leg

## 2023-05-30 NOTE — ED PROVIDER NOTE - CLINICAL SUMMARY MEDICAL DECISION MAKING FREE TEXT BOX
Heri - Patient is a 67-year-old female with no other medical problems who presents to the ED after mechanical trip and fall with right hip pain. concern for hip fx. will check xrays, pre op labs and ekg, morphine for pain control

## 2023-05-30 NOTE — ED PROVIDER NOTE - HIV OFFER
Caller would like to discuss an/a Appointment for New Patient. Writer advised caller of callback within 24-72 hours.    Patient Name: Mynor Gomez  Caller Name: Ruben (Anne-Marie)  Name of Facility: [facility]  -Callback Number: [phone]  Best Availability: anytime  Fax Number: na  Additional Info: Patient would like to make appointment for new patient and needs Dr. Henry's approval. RUBEN GONZALEZ current family member and patient.  Did you confirm the message with the caller?: yes    Thank you,  Rossy Hager  
Okay to offer next new patient appointment.  
RADHA Jain to call back to schedule.   
Opt out

## 2023-05-31 ENCOUNTER — TRANSCRIPTION ENCOUNTER (OUTPATIENT)
Age: 68
End: 2023-05-31

## 2023-05-31 DIAGNOSIS — S72.009A FRACTURE OF UNSPECIFIED PART OF NECK OF UNSPECIFIED FEMUR, INITIAL ENCOUNTER FOR CLOSED FRACTURE: ICD-10-CM

## 2023-05-31 DIAGNOSIS — S72.001A FRACTURE OF UNSPECIFIED PART OF NECK OF RIGHT FEMUR, INITIAL ENCOUNTER FOR CLOSED FRACTURE: ICD-10-CM

## 2023-05-31 DIAGNOSIS — Z29.9 ENCOUNTER FOR PROPHYLACTIC MEASURES, UNSPECIFIED: ICD-10-CM

## 2023-05-31 LAB
ALBUMIN SERPL ELPH-MCNC: 4.1 G/DL — SIGNIFICANT CHANGE UP (ref 3.3–5)
ALP SERPL-CCNC: 83 U/L — SIGNIFICANT CHANGE UP (ref 40–120)
ALT FLD-CCNC: 25 U/L — SIGNIFICANT CHANGE UP (ref 4–33)
ANION GAP SERPL CALC-SCNC: 11 MMOL/L — SIGNIFICANT CHANGE UP (ref 7–14)
ANION GAP SERPL CALC-SCNC: 13 MMOL/L — SIGNIFICANT CHANGE UP (ref 7–14)
APTT BLD: 26.2 SEC — LOW (ref 27–36.3)
APTT BLD: 27.4 SEC — SIGNIFICANT CHANGE UP (ref 27–36.3)
AST SERPL-CCNC: 36 U/L — HIGH (ref 4–32)
BASOPHILS # BLD AUTO: 0.01 K/UL — SIGNIFICANT CHANGE UP (ref 0–0.2)
BASOPHILS NFR BLD AUTO: 0.1 % — SIGNIFICANT CHANGE UP (ref 0–2)
BILIRUB SERPL-MCNC: 0.5 MG/DL — SIGNIFICANT CHANGE UP (ref 0.2–1.2)
BLD GP AB SCN SERPL QL: NEGATIVE — SIGNIFICANT CHANGE UP
BLD GP AB SCN SERPL QL: NEGATIVE — SIGNIFICANT CHANGE UP
BUN SERPL-MCNC: 11 MG/DL — SIGNIFICANT CHANGE UP (ref 7–23)
BUN SERPL-MCNC: 9 MG/DL — SIGNIFICANT CHANGE UP (ref 7–23)
CALCIUM SERPL-MCNC: 8.5 MG/DL — SIGNIFICANT CHANGE UP (ref 8.4–10.5)
CALCIUM SERPL-MCNC: 9.1 MG/DL — SIGNIFICANT CHANGE UP (ref 8.4–10.5)
CHLORIDE SERPL-SCNC: 104 MMOL/L — SIGNIFICANT CHANGE UP (ref 98–107)
CHLORIDE SERPL-SCNC: 105 MMOL/L — SIGNIFICANT CHANGE UP (ref 98–107)
CO2 SERPL-SCNC: 21 MMOL/L — LOW (ref 22–31)
CO2 SERPL-SCNC: 21 MMOL/L — LOW (ref 22–31)
CREAT SERPL-MCNC: 0.61 MG/DL — SIGNIFICANT CHANGE UP (ref 0.5–1.3)
CREAT SERPL-MCNC: 0.62 MG/DL — SIGNIFICANT CHANGE UP (ref 0.5–1.3)
EGFR: 98 ML/MIN/1.73M2 — SIGNIFICANT CHANGE UP
EGFR: 99 ML/MIN/1.73M2 — SIGNIFICANT CHANGE UP
EOSINOPHIL # BLD AUTO: 0.04 K/UL — SIGNIFICANT CHANGE UP (ref 0–0.5)
EOSINOPHIL NFR BLD AUTO: 0.4 % — SIGNIFICANT CHANGE UP (ref 0–6)
GLUCOSE SERPL-MCNC: 114 MG/DL — HIGH (ref 70–99)
GLUCOSE SERPL-MCNC: 124 MG/DL — HIGH (ref 70–99)
HCT VFR BLD CALC: 36.3 % — SIGNIFICANT CHANGE UP (ref 34.5–45)
HCT VFR BLD CALC: 40.1 % — SIGNIFICANT CHANGE UP (ref 34.5–45)
HCT VFR BLD CALC: 42.1 % — SIGNIFICANT CHANGE UP (ref 34.5–45)
HGB BLD-MCNC: 11.8 G/DL — SIGNIFICANT CHANGE UP (ref 11.5–15.5)
HGB BLD-MCNC: 12.8 G/DL — SIGNIFICANT CHANGE UP (ref 11.5–15.5)
HGB BLD-MCNC: 13.3 G/DL — SIGNIFICANT CHANGE UP (ref 11.5–15.5)
IANC: 8.68 K/UL — HIGH (ref 1.8–7.4)
IMM GRANULOCYTES NFR BLD AUTO: 0.6 % — SIGNIFICANT CHANGE UP (ref 0–0.9)
INR BLD: 1.06 RATIO — SIGNIFICANT CHANGE UP (ref 0.88–1.16)
INR BLD: 1.06 RATIO — SIGNIFICANT CHANGE UP (ref 0.88–1.16)
LYMPHOCYTES # BLD AUTO: 0.64 K/UL — LOW (ref 1–3.3)
LYMPHOCYTES # BLD AUTO: 6.6 % — LOW (ref 13–44)
MCHC RBC-ENTMCNC: 29.2 PG — SIGNIFICANT CHANGE UP (ref 27–34)
MCHC RBC-ENTMCNC: 29.2 PG — SIGNIFICANT CHANGE UP (ref 27–34)
MCHC RBC-ENTMCNC: 29.8 PG — SIGNIFICANT CHANGE UP (ref 27–34)
MCHC RBC-ENTMCNC: 31.6 GM/DL — LOW (ref 32–36)
MCHC RBC-ENTMCNC: 31.9 GM/DL — LOW (ref 32–36)
MCHC RBC-ENTMCNC: 32.5 GM/DL — SIGNIFICANT CHANGE UP (ref 32–36)
MCV RBC AUTO: 89.9 FL — SIGNIFICANT CHANGE UP (ref 80–100)
MCV RBC AUTO: 92.3 FL — SIGNIFICANT CHANGE UP (ref 80–100)
MCV RBC AUTO: 93.3 FL — SIGNIFICANT CHANGE UP (ref 80–100)
MONOCYTES # BLD AUTO: 0.3 K/UL — SIGNIFICANT CHANGE UP (ref 0–0.9)
MONOCYTES NFR BLD AUTO: 3.1 % — SIGNIFICANT CHANGE UP (ref 2–14)
NEUTROPHILS # BLD AUTO: 8.68 K/UL — HIGH (ref 1.8–7.4)
NEUTROPHILS NFR BLD AUTO: 89.2 % — HIGH (ref 43–77)
NRBC # BLD: 0 /100 WBCS — SIGNIFICANT CHANGE UP (ref 0–0)
NRBC # FLD: 0 K/UL — SIGNIFICANT CHANGE UP (ref 0–0)
PLATELET # BLD AUTO: 188 K/UL — SIGNIFICANT CHANGE UP (ref 150–400)
PLATELET # BLD AUTO: 196 K/UL — SIGNIFICANT CHANGE UP (ref 150–400)
PLATELET # BLD AUTO: 203 K/UL — SIGNIFICANT CHANGE UP (ref 150–400)
POTASSIUM SERPL-MCNC: 3.9 MMOL/L — SIGNIFICANT CHANGE UP (ref 3.5–5.3)
POTASSIUM SERPL-MCNC: 3.9 MMOL/L — SIGNIFICANT CHANGE UP (ref 3.5–5.3)
POTASSIUM SERPL-SCNC: 3.9 MMOL/L — SIGNIFICANT CHANGE UP (ref 3.5–5.3)
POTASSIUM SERPL-SCNC: 3.9 MMOL/L — SIGNIFICANT CHANGE UP (ref 3.5–5.3)
PROT SERPL-MCNC: 6.9 G/DL — SIGNIFICANT CHANGE UP (ref 6–8.3)
PROTHROM AB SERPL-ACNC: 12.3 SEC — SIGNIFICANT CHANGE UP (ref 10.5–13.4)
PROTHROM AB SERPL-ACNC: 12.3 SEC — SIGNIFICANT CHANGE UP (ref 10.5–13.4)
RBC # BLD: 4.04 M/UL — SIGNIFICANT CHANGE UP (ref 3.8–5.2)
RBC # BLD: 4.3 M/UL — SIGNIFICANT CHANGE UP (ref 3.8–5.2)
RBC # BLD: 4.56 M/UL — SIGNIFICANT CHANGE UP (ref 3.8–5.2)
RBC # FLD: 12.7 % — SIGNIFICANT CHANGE UP (ref 10.3–14.5)
RBC # FLD: 13 % — SIGNIFICANT CHANGE UP (ref 10.3–14.5)
RBC # FLD: 13 % — SIGNIFICANT CHANGE UP (ref 10.3–14.5)
RH IG SCN BLD-IMP: POSITIVE — SIGNIFICANT CHANGE UP
RH IG SCN BLD-IMP: POSITIVE — SIGNIFICANT CHANGE UP
SARS-COV-2 RNA SPEC QL NAA+PROBE: SIGNIFICANT CHANGE UP
SODIUM SERPL-SCNC: 137 MMOL/L — SIGNIFICANT CHANGE UP (ref 135–145)
SODIUM SERPL-SCNC: 138 MMOL/L — SIGNIFICANT CHANGE UP (ref 135–145)
WBC # BLD: 11.49 K/UL — HIGH (ref 3.8–10.5)
WBC # BLD: 8.39 K/UL — SIGNIFICANT CHANGE UP (ref 3.8–10.5)
WBC # BLD: 9.73 K/UL — SIGNIFICANT CHANGE UP (ref 3.8–10.5)
WBC # FLD AUTO: 11.49 K/UL — HIGH (ref 3.8–10.5)
WBC # FLD AUTO: 8.39 K/UL — SIGNIFICANT CHANGE UP (ref 3.8–10.5)
WBC # FLD AUTO: 9.73 K/UL — SIGNIFICANT CHANGE UP (ref 3.8–10.5)

## 2023-05-31 PROCEDURE — 72192 CT PELVIS W/O DYE: CPT | Mod: 26

## 2023-05-31 PROCEDURE — 99223 1ST HOSP IP/OBS HIGH 75: CPT

## 2023-05-31 PROCEDURE — 72170 X-RAY EXAM OF PELVIS: CPT | Mod: 26

## 2023-05-31 PROCEDURE — 27130 TOTAL HIP ARTHROPLASTY: CPT | Mod: RT

## 2023-05-31 DEVICE — STEM CMT HIP ACCOLADE II V40 30X101MM 132D SZ 3: Type: IMPLANTABLE DEVICE | Site: RIGHT | Status: FUNCTIONAL

## 2023-05-31 DEVICE — LINER ACET TRIDENT X3 0 DEG 36MM D: Type: IMPLANTABLE DEVICE | Site: RIGHT | Status: FUNCTIONAL

## 2023-05-31 DEVICE — SHELL ACET TRIDENT II D 50MM: Type: IMPLANTABLE DEVICE | Site: RIGHT | Status: FUNCTIONAL

## 2023-05-31 DEVICE — HEAD FEM CER V40 36MM  PLUS 0MM: Type: IMPLANTABLE DEVICE | Site: RIGHT | Status: FUNCTIONAL

## 2023-05-31 DEVICE — SCREW HEX LO PROF 6.5X40MM: Type: IMPLANTABLE DEVICE | Site: RIGHT | Status: FUNCTIONAL

## 2023-05-31 RX ORDER — POLYETHYLENE GLYCOL 3350 17 G/17G
17 POWDER, FOR SOLUTION ORAL AT BEDTIME
Refills: 0 | Status: DISCONTINUED | OUTPATIENT
Start: 2023-05-31 | End: 2023-06-01

## 2023-05-31 RX ORDER — OXYCODONE HYDROCHLORIDE 5 MG/1
10 TABLET ORAL
Refills: 0 | Status: DISCONTINUED | OUTPATIENT
Start: 2023-05-31 | End: 2023-06-01

## 2023-05-31 RX ORDER — OXYCODONE HYDROCHLORIDE 5 MG/1
10 TABLET ORAL EVERY 4 HOURS
Refills: 0 | Status: DISCONTINUED | OUTPATIENT
Start: 2023-05-31 | End: 2023-06-01

## 2023-05-31 RX ORDER — CEFAZOLIN SODIUM 1 G
2000 VIAL (EA) INJECTION EVERY 8 HOURS
Refills: 0 | Status: COMPLETED | OUTPATIENT
Start: 2023-05-31 | End: 2023-06-01

## 2023-05-31 RX ORDER — MAGNESIUM HYDROXIDE 400 MG/1
30 TABLET, CHEWABLE ORAL DAILY
Refills: 0 | Status: DISCONTINUED | OUTPATIENT
Start: 2023-05-31 | End: 2023-06-01

## 2023-05-31 RX ORDER — SODIUM CHLORIDE 9 MG/ML
500 INJECTION, SOLUTION INTRAVENOUS ONCE
Refills: 0 | Status: COMPLETED | OUTPATIENT
Start: 2023-05-31 | End: 2023-05-31

## 2023-05-31 RX ORDER — ACETAMINOPHEN 500 MG
975 TABLET ORAL EVERY 8 HOURS
Refills: 0 | Status: DISCONTINUED | OUTPATIENT
Start: 2023-06-01 | End: 2023-06-01

## 2023-05-31 RX ORDER — ACETAMINOPHEN 500 MG
1000 TABLET ORAL ONCE
Refills: 0 | Status: COMPLETED | OUTPATIENT
Start: 2023-05-31 | End: 2023-05-31

## 2023-05-31 RX ORDER — SODIUM CHLORIDE 9 MG/ML
500 INJECTION, SOLUTION INTRAVENOUS ONCE
Refills: 0 | Status: COMPLETED | OUTPATIENT
Start: 2023-05-31 | End: 2023-06-01

## 2023-05-31 RX ORDER — PANTOPRAZOLE SODIUM 20 MG/1
40 TABLET, DELAYED RELEASE ORAL
Refills: 0 | Status: DISCONTINUED | OUTPATIENT
Start: 2023-05-31 | End: 2023-06-01

## 2023-05-31 RX ORDER — HYDROMORPHONE HYDROCHLORIDE 2 MG/ML
0.5 INJECTION INTRAMUSCULAR; INTRAVENOUS; SUBCUTANEOUS
Refills: 0 | Status: DISCONTINUED | OUTPATIENT
Start: 2023-05-31 | End: 2023-05-31

## 2023-05-31 RX ORDER — TRAMADOL HYDROCHLORIDE 50 MG/1
50 TABLET ORAL EVERY 6 HOURS
Refills: 0 | Status: DISCONTINUED | OUTPATIENT
Start: 2023-05-31 | End: 2023-06-01

## 2023-05-31 RX ORDER — ONDANSETRON 8 MG/1
4 TABLET, FILM COATED ORAL ONCE
Refills: 0 | Status: DISCONTINUED | OUTPATIENT
Start: 2023-05-31 | End: 2023-05-31

## 2023-05-31 RX ORDER — POVIDONE-IODINE 5 %
1 AEROSOL (ML) TOPICAL ONCE
Refills: 0 | Status: COMPLETED | OUTPATIENT
Start: 2023-05-31 | End: 2023-05-31

## 2023-05-31 RX ORDER — APIXABAN 2.5 MG/1
2.5 TABLET, FILM COATED ORAL EVERY 12 HOURS
Refills: 0 | Status: DISCONTINUED | OUTPATIENT
Start: 2023-06-01 | End: 2023-06-01

## 2023-05-31 RX ORDER — ACETAMINOPHEN 500 MG
1000 TABLET ORAL ONCE
Refills: 0 | Status: COMPLETED | OUTPATIENT
Start: 2023-06-01 | End: 2023-06-01

## 2023-05-31 RX ORDER — CHLORHEXIDINE GLUCONATE 213 G/1000ML
1 SOLUTION TOPICAL ONCE
Refills: 0 | Status: COMPLETED | OUTPATIENT
Start: 2023-05-31 | End: 2023-05-31

## 2023-05-31 RX ORDER — SENNA PLUS 8.6 MG/1
2 TABLET ORAL AT BEDTIME
Refills: 0 | Status: DISCONTINUED | OUTPATIENT
Start: 2023-05-31 | End: 2023-06-01

## 2023-05-31 RX ORDER — OXYCODONE HYDROCHLORIDE 5 MG/1
5 TABLET ORAL
Refills: 0 | Status: DISCONTINUED | OUTPATIENT
Start: 2023-05-31 | End: 2023-06-01

## 2023-05-31 RX ORDER — OXYCODONE HYDROCHLORIDE 5 MG/1
5 TABLET ORAL EVERY 4 HOURS
Refills: 0 | Status: DISCONTINUED | OUTPATIENT
Start: 2023-05-31 | End: 2023-06-01

## 2023-05-31 RX ORDER — ALBUMIN HUMAN 25 %
250 VIAL (ML) INTRAVENOUS ONCE
Refills: 0 | Status: COMPLETED | OUTPATIENT
Start: 2023-05-31 | End: 2023-05-31

## 2023-05-31 RX ADMIN — Medication 100 MILLIGRAM(S): at 23:42

## 2023-05-31 RX ADMIN — OXYCODONE HYDROCHLORIDE 10 MILLIGRAM(S): 5 TABLET ORAL at 03:07

## 2023-05-31 RX ADMIN — Medication 1 APPLICATION(S): at 06:25

## 2023-05-31 RX ADMIN — Medication 1000 MILLIGRAM(S): at 03:00

## 2023-05-31 RX ADMIN — SODIUM CHLORIDE 500 MILLILITER(S): 9 INJECTION, SOLUTION INTRAVENOUS at 17:45

## 2023-05-31 RX ADMIN — HYDROMORPHONE HYDROCHLORIDE 0.5 MILLIGRAM(S): 2 INJECTION INTRAMUSCULAR; INTRAVENOUS; SUBCUTANEOUS at 18:15

## 2023-05-31 RX ADMIN — HYDROMORPHONE HYDROCHLORIDE 0.5 MILLIGRAM(S): 2 INJECTION INTRAMUSCULAR; INTRAVENOUS; SUBCUTANEOUS at 18:00

## 2023-05-31 RX ADMIN — Medication 400 MILLIGRAM(S): at 02:36

## 2023-05-31 RX ADMIN — Medication 125 MILLILITER(S): at 18:53

## 2023-05-31 RX ADMIN — SODIUM CHLORIDE 500 MILLILITER(S): 9 INJECTION, SOLUTION INTRAVENOUS at 22:08

## 2023-05-31 RX ADMIN — CHLORHEXIDINE GLUCONATE 1 APPLICATION(S): 213 SOLUTION TOPICAL at 06:26

## 2023-05-31 NOTE — PROGRESS NOTE ADULT - SUBJECTIVE AND OBJECTIVE BOX
Orthopedics Post-Op Check:  Patient was seen and examined at bedside. Denies CP/SOB/Dizziness, N/V/D, HA. Pain is controlled.     Vital Signs Last 24 Hrs  T(C): 37 (31 May 2023 19:00), Max: 37.3 (31 May 2023 01:04)  T(F): 98.6 (31 May 2023 19:00), Max: 99.2 (31 May 2023 01:04)  HR: 67 (31 May 2023 19:00) (61 - 97)  BP: 103/48 (31 May 2023 19:00) (98/50 - 145/78)  BP(mean): 61 (31 May 2023 19:00) (61 - 90)  RR: 12 (31 May 2023 19:00) (12 - 20)  SpO2: 100% (31 May 2023 19:00) (93% - 100%)    Parameters below as of 31 May 2023 19:00  Patient On (Oxygen Delivery Method): nasal cannula  O2 Flow (L/min): 2    Labs:                        13.3   11.49 )-----------( 188      ( 31 May 2023 18:20 )             42.1     05-31    137  |  105  |  9   ----------------------------<  124<H>  3.9   |  21<L>  |  0.61    Ca    8.5      31 May 2023 02:30    TPro  6.9  /  Alb  4.1  /  TBili  0.5  /  DBili  x   /  AST  36<H>  /  ALT  25  /  AlkPhos  83  05-30    Physical Exam:  Gen: NAD, A&Ox3  R LE:   Dressing C/D/I  Motor intact + EHL/FHL/TA/GS. Sensation is grossly intact.   Compartments are soft, extremities are warm, DP 2+

## 2023-05-31 NOTE — H&P ADULT - HISTORY OF PRESENT ILLNESS
67yFemale community ambulator c/o R hip pain s/p mechanical fall over a concrete step. Patient denies head hit or LOC. Patient denies numbness or tingling in the RLLE. Patient denies any other injuries.    PMH:  No pertinent past medical history      PSH:  No significant past surgical history      AH:  No Known Allergies    Meds: See med rec    T(C): 37.3 (05-31-23 @ 01:04)  HR: 88 (05-31-23 @ 01:04)  BP: 115/62 (05-31-23 @ 01:04)  RR: 17 (05-31-23 @ 01:04)  SpO2: 95% (05-31-23 @ 01:04)  Wt(kg): --                        12.8   9.73  )-----------( 203      ( 30 May 2023 22:52 )             40.1     05-30    138  |  104  |  11  ----------------------------<  114<H>  3.9   |  21<L>  |  0.62    Ca    9.1      30 May 2023 22:52    TPro  6.9  /  Alb  4.1  /  TBili  0.5  /  DBili  x   /  AST  36<H>  /  ALT  25  /  AlkPhos  83  05-30    PT/INR - ( 30 May 2023 22:52 )   PT: 12.3 sec;   INR: 1.06 ratio         PTT - ( 30 May 2023 22:52 )  PTT:27.4 sec      PE  Gen: Laying in bed, alert and oriented, NAD  Resp: Unlabored breathing  RLE: Skin intact, no ecchymosis, TTP over the R hip  SILT DP/SP/ Esteban/Saph/Post Tib  +EHL/FHL/TA/Gastroc,   ankle painless ROM,   hip ROM limited 2/2 pain,   DP+,   soft compartments, no calf ttp,   +log roll.    Secondary:  No TTP over bony landmarks, SILT BL, ROM intact BL, distal pulses palpable.    Imaging:  XR demonstrating with Right Femoral Neck Fracture      67yFemale with Right Femoral Neck Fracture. Plan for OR 5/31    - Pain control  - IS  - Continue home medications  - NPO/IVF  - CBC/BMP/Coags/UA/T+S x2  - EKG/CXR  -  Medical clearance prior to planned procedure  - Plan for OR 5/31  - hold DVT ppx

## 2023-05-31 NOTE — PROGRESS NOTE ADULT - SUBJECTIVE AND OBJECTIVE BOX
Patient seen and evaluated this AM.  Pain under control.  Oxy makes her feel lightheaded.  Denies any CP/SOB/difficulty breathing.  Family at bedside.     States she did not have prior hip / groin pain in either hip.    ICU Vital Signs Last 24 Hrs  T(C): 36.8 (31 May 2023 04:23), Max: 37.3 (31 May 2023 01:04)  T(F): 98.3 (31 May 2023 04:23), Max: 99.2 (31 May 2023 01:04)  HR: 61 (31 May 2023 04:23) (61 - 88)  BP: 118/61 (31 May 2023 04:23) (112/68 - 145/78)  BP(mean): --  ABP: --  ABP(mean): --  RR: 16 (31 May 2023 04:23) (16 - 18)  SpO2: 98% (31 May 2023 04:23) (95% - 98%)    O2 Parameters below as of 31 May 2023 04:23  Patient On (Oxygen Delivery Method): room air        Gen: NAD, resting comfortably    R hip:  +TTP  No overlying ecchymoses / skin disruption  + DP/PT  Knee / ankle ROM w/ minimal pain      67yFemale with Right Femoral Neck Fracture. Plan for OR 5/31    - Pain control  - IS  - Continue home medications  - NPO/IVF  - CBC/BMP/Coags/UA/T+S x2  - Appreciate medical clearance   - Plan for OR 5/31  - Hold DVT ppx

## 2023-05-31 NOTE — DISCHARGE NOTE PROVIDER - HOSPITAL COURSE
This is a 66yo Female with PMH of left thumb/index finger fusion (March 2023 Dr. Liao) who presents to [facility] s/p mechanical fall. In the ED patient was found to have a right femoral neck fracture and was admitted to Orthopedic Surgery. Patient s/p right DAREK with Dr. Oakley on 5/31/2023. Patient tolerated the procedure well without any intraoperative complications. Patient tolerated physical therapy well, and the pain was controlled. Patient is weight bearing as tolerated with cane/walker as needed, ANTERIOR HIP PRECAUTIONS. Seen by medical attending for continuity of care and management and cleared for safe discharge. Keep dressing/incision clean, dry and intact. Any suture/staples to be removed on post-op day #14 your office visit. Patient is on 2.5mg of Eliquis for DVT prophylaxis, please take for 6 weeks unless otherwise instructed by your surgeon. Please follow up with Dr. Oakley in 2 weeks, call the office to make an appointment, [phone]. Please follow up with your PMD for continuity of care and management as medications may have changed.

## 2023-05-31 NOTE — CONSULT NOTE ADULT - ASSESSMENT
67-year-old female with no other medical problems who presents to the ED after mechanical trip and fall with right hip pain.  Now admitted for R femoral neck neck fracture and admitted for orthopedic intervention. Medicine consulted for medical optimization.

## 2023-05-31 NOTE — CONSULT NOTE ADULT - SUBJECTIVE AND OBJECTIVE BOX
HPI:  67-year-old female with no other medical problems who presents to the ED after mechanical trip and fall over a concrete step with right hip pain.  She states that she had no prodromal syndrome before the fall and denies any chest pain, palpitations, shortness of breath, dizziness, lightheadedness.  She fell onto her right side and was unable to get up secondary to right hip pain.  She denies any other bony or MSK pain.  She denies any headache or hitting her head, changes in vision, difficulty with speech.  She is not on any blood thinners.  She denies any weakness or numbness in her leg    PMH:  No pertinent past medical history    PSH:  No significant past surgical history    PAST MEDICAL & SURGICAL HISTORY:  No pertinent past medical history  No significant past surgical history  Review of Systems:   CONSTITUTIONAL: No fever, weight loss, or fatigue  EYES: No eye pain, visual disturbances, or discharge  ENMT:  No difficulty hearing, tinnitus, vertigo; No sinus or throat pain  NECK: No pain or stiffness  BREASTS: No pain, masses, or nipple discharge  RESPIRATORY: No cough, wheezing, chills or hemoptysis; No shortness of breath  CARDIOVASCULAR: No chest pain, palpitations, dizziness, or leg swelling  GASTROINTESTINAL: No abdominal or epigastric pain. No nausea, vomiting, or hematemesis; No diarrhea or constipation. No melena or hematochezia.  GENITOURINARY: No dysuria, frequency, hematuria, or incontinence  NEUROLOGICAL: No headaches, memory loss, loss of strength, numbness, or tremors  SKIN: No itching, burning, rashes, or lesions   LYMPH NODES: No enlarged glands  ENDOCRINE: No heat or cold intolerance; No hair loss  MUSCULOSKELETAL: No joint pain or swelling; No muscle, back, or extremity pain  PSYCHIATRIC: No depression, anxiety, mood swings, or difficulty sleeping  HEME/LYMPH: No easy bruising, or bleeding gums  ALLERY AND IMMUNOLOGIC: No hives or eczema    Allergies    No Known Allergies    Intolerances  Social History:     FAMILY HISTORY:    MEDICATIONS  (STANDING):  chlorhexidine 2% Cloths 1 Application(s) Topical once  povidone iodine 5% Nasal Swab 1 Application(s) Both Nostrils once    MEDICATIONS  (PRN):  magnesium hydroxide Suspension 30 milliLiter(s) Oral daily PRN Constipation  oxyCODONE    IR 10 milliGRAM(s) Oral every 4 hours PRN Moderate Pain (4 - 6)  oxyCODONE    IR 5 milliGRAM(s) Oral every 4 hours PRN Mild Pain (1 - 3)    CAPILLARY BLOOD GLUCOSE    I&O's Summary    PHYSICAL EXAM:  GENERAL: NAD, well-developed  HEAD:  Atraumatic, Normocephalic  EYES: EOMI, PERRLA, conjunctiva and sclera clear  NECK: Supple, No JVD  CHEST/LUNG: Clear to auscultation bilaterally; No wheeze  HEART: Regular rate and rhythm; No murmurs, rubs, or gallops  ABDOMEN: Soft, Nontender, Nondistended; Bowel sounds present  EXTREMITIES:  2+ Peripheral Pulses, No clubbing, cyanosis, or edema  PSYCH: AAOx3  NEUROLOGY: non-focal  SKIN: No rashes or lesions    LABS:                        11.8   8.39  )-----------( 196      ( 31 May 2023 02:30 )             36.3     05-31    137  |  105  |  9   ----------------------------<  124<H>  3.9   |  21<L>  |  0.61    Ca    8.5      31 May 2023 02:30    TPro  6.9  /  Alb  4.1  /  TBili  0.5  /  DBili  x   /  AST  36<H>  /  ALT  25  /  AlkPhos  83  05-30    PT/INR - ( 31 May 2023 02:30 )   PT: 12.3 sec;   INR: 1.06 ratio       PTT - ( 31 May 2023 02:30 )  PTT:26.2 sec    RADIOLOGY & ADDITIONAL TESTS:     IMPRESSION:  Acute subcapital fracture of the right femoral neck.    Care Discussed with Consultants/Other Providers: Orthopedics team HPI:  67-year-old female with no medical problems, remote former smoker (quit 40 years ago with very remote smoking history of a few cigarettes) who presents to the ED after mechanical trip and fall over a concrete step.  She states that she had no prodromal syndrome before the fall and denies any chest pain, palpitations, shortness of breath, dizziness, lightheadedness.  She fell onto her right side and was unable to get up secondary to right hip pain.  She denies head trauma.  She denies any headache or hitting her head, changes in vision, difficulty with speech.  She denies any weakness or numbness in her leg. She is previously a healthy and active individual and walks around the community with her dog and goes up and down the stairs daily. Denies dyspnea, chest pain, fever, chills, headaches.    PMH:  No pertinent past medical history    PSH:  Hx of  in 40s  Hx of L finger surgery    Review of Systems:   CONSTITUTIONAL: No fever, weight loss, or fatigue  EYES: No eye pain, visual disturbances, or discharge  ENMT:  No difficulty hearing, tinnitus, vertigo; No sinus or throat pain  NECK: No pain or stiffness  BREASTS: No pain, masses, or nipple discharge  RESPIRATORY: No cough, wheezing, chills or hemoptysis; No shortness of breath  CARDIOVASCULAR: No chest pain, palpitations, dizziness, or leg swelling  GASTROINTESTINAL: No abdominal or epigastric pain. No nausea, vomiting, or hematemesis; No diarrhea or constipation. No melena or hematochezia.  GENITOURINARY: No dysuria, frequency, hematuria, or incontinence  NEUROLOGICAL: No headaches, memory loss, loss of strength, numbness, or tremors  SKIN: No itching, burning, rashes, or lesions   LYMPH NODES: No enlarged glands  ENDOCRINE: No heat or cold intolerance; No hair loss  MUSCULOSKELETAL: Hip pain  PSYCHIATRIC: No depression, anxiety, mood swings, or difficulty sleeping  HEME/LYMPH: No easy bruising, or bleeding gums  ALLERY AND IMMUNOLOGIC: No hives or eczema    Allergies    No Known Allergies    Intolerances  Social History:     FAMILY HISTORY:    MEDICATIONS  (STANDING):  chlorhexidine 2% Cloths 1 Application(s) Topical once  povidone iodine 5% Nasal Swab 1 Application(s) Both Nostrils once    MEDICATIONS  (PRN):  magnesium hydroxide Suspension 30 milliLiter(s) Oral daily PRN Constipation  oxyCODONE    IR 10 milliGRAM(s) Oral every 4 hours PRN Moderate Pain (4 - 6)  oxyCODONE    IR 5 milliGRAM(s) Oral every 4 hours PRN Mild Pain (1 - 3)    CAPILLARY BLOOD GLUCOSE    I&O's Summary    PHYSICAL EXAM:  GENERAL: NAD, well-developed  HEAD:  Atraumatic, Normocephalic  EYES: EOMI, PERRLA, conjunctiva and sclera clear  NECK: Supple, No JVD  CHEST/LUNG: Clear to auscultation bilaterally; No wheeze  HEART: Regular rate and rhythm; No murmurs, rubs, or gallops  ABDOMEN: Soft, Nontender, Nondistended; Bowel sounds present  EXTREMITIES:  2+ Peripheral Pulses, No clubbing, cyanosis, or edema, able to move R ankl, hip flexion limited due to pain and fracture  PSYCH: AAOx3  NEUROLOGY: non-focal  SKIN: No rashes or lesions    LABS:                        11.8   8.39  )-----------( 196      ( 31 May 2023 02:30 )             36.3         137  |  105  |  9   ----------------------------<  124<H>  3.9   |  21<L>  |  0.61    Ca    8.5      31 May 2023 02:30    TPro  6.9  /  Alb  4.1  /  TBili  0.5  /  DBili  x   /  AST  36<H>  /  ALT  25  /  AlkPhos  83  05-30    PT/INR - ( 31 May 2023 02:30 )   PT: 12.3 sec;   INR: 1.06 ratio       PTT - ( 31 May 2023 02:30 )  PTT:26.2 sec    RADIOLOGY & ADDITIONAL TESTS:    IMPRESSION:  Acute subcapital fracture of the right femoral neck.    Care Discussed with Consultants/Other Providers: Orthopedics team

## 2023-05-31 NOTE — DISCHARGE NOTE PROVIDER - NSDCCPTREATMENT_GEN_ALL_CORE_FT
PRINCIPAL PROCEDURE  Procedure: Right total hip arthroplasty  Findings and Treatment: Pain control:   Standing:         -Acetaminophen 500mg - 2 tabs every 8 hours  As needed:        -Tramadol 50mg - 1 tab every 6 hours - Take only if needed for MODERATE pain       -oxycodone 5mg - 1 tab every 4-6 hours - Take only if needed for SEVERE or BREAKTHROUGH pain  Oxycodone and Tramadol have been sent to your pharmacy. Please do not drive, operate machinery, or make important decisions while taking these medications.   Other Medications: (Standing)  -Eliquis 2.5mg every 12 hours - to prevent blood clots (for 6 weeks post operatively.)  -Protonix 40mg - 1 tab every 24 hours - to prevent stomach irritation/ulcers  -Senna 8.6mg - 2 pills every 24 hours - stool softener  -Miralax 17g - daily - constipation   Follow up: Please follow up at your prescheduled post-operative follow up appointment with Dr. Smith***** for 2 weeks after hospital discharge. Please call with any questions or concerns including fevers, worsening pain, pus from the wounds, redness of the skin and difficulty breathing or heaviness in the chest at [phone].   Please also follow up with your ****PCP**** within 1 week to *******evaluate blood pressure**** and to approve resuming home medications.

## 2023-05-31 NOTE — PROGRESS NOTE ADULT - SUBJECTIVE AND OBJECTIVE BOX
ORTHO ATTENDING POST OP    s/p R DAREK  WBAT  R LE  Anterior hip precautions  Eliquis 2.5 BID  venodynes  ancef x 24h  OOB to chair in AM  rehab consult  CBC in RR and AM   f/u medicine

## 2023-05-31 NOTE — PATIENT PROFILE ADULT - FALL HARM RISK - HARM RISK INTERVENTIONS

## 2023-05-31 NOTE — DISCHARGE NOTE PROVIDER - NSDCMRMEDTOKEN_GEN_ALL_CORE_FT
acetaminophen 325 mg oral tablet: 3 tab(s) orally every 8 hours  apixaban 2.5 mg oral tablet: 1 tab(s) orally every 12 hours  oxyCODONE 10 mg oral tablet: 1 tab(s) orally every 4 hours As needed Moderate Pain (4 - 6)  oxyCODONE 5 mg oral tablet: 1 tab(s) orally every 4 hours As needed Mild Pain (1 - 3)  pantoprazole 40 mg oral delayed release tablet: 1 tab(s) orally once a day (before a meal)  polyethylene glycol 3350 oral powder for reconstitution: 17 gram(s) orally once a day (at bedtime)  senna leaf extract oral tablet: 2 tab(s) orally once a day (at bedtime)  traMADol 50 mg oral tablet: 1 tab(s) orally every 6 hours As needed Mild Pain (1 - 3)

## 2023-05-31 NOTE — PRE-OP CHECKLIST - NS ASU TO WHOM HOLDING TO OR
Called [facility] and let them know that if the combination drop was in stock that it would be the preference for what the patient should take.   bronson, rn

## 2023-05-31 NOTE — CONSULT NOTE ADULT - PROBLEM SELECTOR RECOMMENDATION 9
Patient able to walk *** blocks and up *** flight of stairs without ****  RCRI score: ****  Patient is *** Patient able to walk many blocks with her active dogs and up 20+ flight of stairs without dyspnea or chest pain. she is otherwise an active and healthy individual prior to the fall.  RCRI score: 0. EKG NSR without ischemic changes. Low cardiac risk for low risk procedure  Patient is medically optimized for ORIF without further testing  Pain control per primary team. Patient states Oxycodone makes her feel funny otherwise pain well controlled

## 2023-05-31 NOTE — DISCHARGE NOTE PROVIDER - CARE PROVIDER_API CALL
Bautista Oakley  Orthopaedic Surgery  [address], [address]  [place] [address]  Phone: [phone]  Fax: [phone]  Established Patient  Follow Up Time:

## 2023-05-31 NOTE — DISCHARGE NOTE PROVIDER - NSDCCPCAREPLAN_GEN_ALL_CORE_FT
PRINCIPAL DISCHARGE DIAGNOSIS  Diagnosis: Hip fracture  Assessment and Plan of Treatment: Diet: Continue regular diet upon discharge.   Activity: No heavy lifting > 25 lbs for 4 weeks. Avoid straining or excessive activity x 6 weeks.   -Continue to use your walker when ambulating until your postoperative follow up appointment.   Anterior Hip Precautions:  -DO NOT step backwards with surgical leg. No hip extension.  -DO NOT allow surgical leg to externally rotate (turn outwards).  -DO NOT cross your legs. Use a pillow between legs when rolling.  -DO NOT lie on stomach.  Dressings: Keep dressing clean, dry, and intact. Your doctor will remove your bandage at your post-operative follow up appointment.   Other Care:   -You may shower when you get home but DO NOT soak dressing and/or incision. The water may run over your dressing/incision but DO NOT let the water directly hit your dressing/incision (take a shower with your wound away from the direct stream of water). NO hot tubes, NO bath rubs, NO swimming pools.   -Elevate your operative leg 2 feet above heart level for 2 hours in the morning, 2 hours in the afternoon, and 2 hours in the evening.   -Apply ice for 20min every time you elevate.   -Sit for 90 min/day: 45mins x2 or 30min x3  -DO NOT sit for more than the 90min/day. Walk or lay down when not elevating your leg.   -DO NOT place the elevation pillow behind your knees. Only place it under your calf and heel.   -DO NOT bend more than 45 degrees at the waist

## 2023-06-01 ENCOUNTER — TRANSCRIPTION ENCOUNTER (OUTPATIENT)
Age: 68
End: 2023-06-01

## 2023-06-01 VITALS
OXYGEN SATURATION: 96 % | TEMPERATURE: 99 F | HEART RATE: 98 BPM | SYSTOLIC BLOOD PRESSURE: 114 MMHG | RESPIRATION RATE: 16 BRPM | DIASTOLIC BLOOD PRESSURE: 50 MMHG

## 2023-06-01 LAB
ANION GAP SERPL CALC-SCNC: 10 MMOL/L — SIGNIFICANT CHANGE UP (ref 7–14)
BUN SERPL-MCNC: 10 MG/DL — SIGNIFICANT CHANGE UP (ref 7–23)
CALCIUM SERPL-MCNC: 8.9 MG/DL — SIGNIFICANT CHANGE UP (ref 8.4–10.5)
CHLORIDE SERPL-SCNC: 107 MMOL/L — SIGNIFICANT CHANGE UP (ref 98–107)
CO2 SERPL-SCNC: 18 MMOL/L — LOW (ref 22–31)
CREAT SERPL-MCNC: 0.61 MG/DL — SIGNIFICANT CHANGE UP (ref 0.5–1.3)
EGFR: 99 ML/MIN/1.73M2 — SIGNIFICANT CHANGE UP
GLUCOSE BLDC GLUCOMTR-MCNC: 90 MG/DL — SIGNIFICANT CHANGE UP (ref 70–99)
GLUCOSE SERPL-MCNC: 111 MG/DL — HIGH (ref 70–99)
HCT VFR BLD CALC: 34.8 % — SIGNIFICANT CHANGE UP (ref 34.5–45)
HGB BLD-MCNC: 10.7 G/DL — LOW (ref 11.5–15.5)
MCHC RBC-ENTMCNC: 29.8 PG — SIGNIFICANT CHANGE UP (ref 27–34)
MCHC RBC-ENTMCNC: 30.7 GM/DL — LOW (ref 32–36)
MCV RBC AUTO: 96.9 FL — SIGNIFICANT CHANGE UP (ref 80–100)
NRBC # BLD: 0 /100 WBCS — SIGNIFICANT CHANGE UP (ref 0–0)
NRBC # FLD: 0 K/UL — SIGNIFICANT CHANGE UP (ref 0–0)
PLATELET # BLD AUTO: 158 K/UL — SIGNIFICANT CHANGE UP (ref 150–400)
POTASSIUM SERPL-MCNC: 4.1 MMOL/L — SIGNIFICANT CHANGE UP (ref 3.5–5.3)
POTASSIUM SERPL-SCNC: 4.1 MMOL/L — SIGNIFICANT CHANGE UP (ref 3.5–5.3)
RBC # BLD: 3.59 M/UL — LOW (ref 3.8–5.2)
RBC # FLD: 12.9 % — SIGNIFICANT CHANGE UP (ref 10.3–14.5)
SODIUM SERPL-SCNC: 135 MMOL/L — SIGNIFICANT CHANGE UP (ref 135–145)
WBC # BLD: 8.3 K/UL — SIGNIFICANT CHANGE UP (ref 3.8–10.5)
WBC # FLD AUTO: 8.3 K/UL — SIGNIFICANT CHANGE UP (ref 3.8–10.5)

## 2023-06-01 PROCEDURE — 99232 SBSQ HOSP IP/OBS MODERATE 35: CPT

## 2023-06-01 RX ORDER — POLYETHYLENE GLYCOL 3350 17 G/17G
17 POWDER, FOR SOLUTION ORAL
Qty: 0 | Refills: 0 | DISCHARGE
Start: 2023-06-01

## 2023-06-01 RX ORDER — PANTOPRAZOLE SODIUM 20 MG/1
1 TABLET, DELAYED RELEASE ORAL
Qty: 0 | Refills: 0 | DISCHARGE
Start: 2023-06-01

## 2023-06-01 RX ORDER — APIXABAN 2.5 MG/1
1 TABLET, FILM COATED ORAL
Qty: 0 | Refills: 0 | DISCHARGE
Start: 2023-06-01

## 2023-06-01 RX ORDER — OXYCODONE HYDROCHLORIDE 5 MG/1
1 TABLET ORAL
Qty: 0 | Refills: 0 | DISCHARGE
Start: 2023-06-01

## 2023-06-01 RX ORDER — SENNA PLUS 8.6 MG/1
2 TABLET ORAL
Qty: 0 | Refills: 0 | DISCHARGE
Start: 2023-06-01

## 2023-06-01 RX ORDER — ACETAMINOPHEN 500 MG
3 TABLET ORAL
Qty: 0 | Refills: 0 | DISCHARGE
Start: 2023-06-01

## 2023-06-01 RX ORDER — TRAMADOL HYDROCHLORIDE 50 MG/1
1 TABLET ORAL
Qty: 0 | Refills: 0 | DISCHARGE
Start: 2023-06-01

## 2023-06-01 RX ADMIN — APIXABAN 2.5 MILLIGRAM(S): 2.5 TABLET, FILM COATED ORAL at 05:39

## 2023-06-01 RX ADMIN — OXYCODONE HYDROCHLORIDE 10 MILLIGRAM(S): 5 TABLET ORAL at 14:30

## 2023-06-01 RX ADMIN — Medication 100 MILLIGRAM(S): at 07:50

## 2023-06-01 RX ADMIN — PANTOPRAZOLE SODIUM 40 MILLIGRAM(S): 20 TABLET, DELAYED RELEASE ORAL at 05:39

## 2023-06-01 RX ADMIN — Medication 1000 MILLIGRAM(S): at 01:30

## 2023-06-01 RX ADMIN — SODIUM CHLORIDE 500 MILLILITER(S): 9 INJECTION, SOLUTION INTRAVENOUS at 05:52

## 2023-06-01 RX ADMIN — OXYCODONE HYDROCHLORIDE 10 MILLIGRAM(S): 5 TABLET ORAL at 13:32

## 2023-06-01 RX ADMIN — Medication 1000 MILLIGRAM(S): at 09:15

## 2023-06-01 RX ADMIN — Medication 400 MILLIGRAM(S): at 01:13

## 2023-06-01 RX ADMIN — Medication 400 MILLIGRAM(S): at 08:50

## 2023-06-01 NOTE — OCCUPATIONAL THERAPY INITIAL EVALUATION ADULT - GENERAL OBSERVATIONS, REHAB EVAL
Patient received semisupine in bed in NAD; agreeable to participate in OT evaluation. BP: 110/62 mmHg.  bpm.

## 2023-06-01 NOTE — DISCHARGE NOTE NURSING/CASE MANAGEMENT/SOCIAL WORK - PATIENT PORTAL LINK FT
You can access the FollowMyHealth Patient Portal offered by [facility] by registering at the following website: [email or web address]. By joining [employer]’s FollowMyHealth portal, you will also be able to view your health information using other applications (apps) compatible with our system.

## 2023-06-01 NOTE — PROGRESS NOTE ADULT - ASSESSMENT
A/P: Patient is a 67y y/o Female s/p R total hip arthroplasty, POD #0   - Anterior hip precautions  - Pain control  - Antibiotics - Ancef postop  - DVT ppx  - Eliquis 2.5 BID  - Incentive spirometry  - Venodynes  - F/U AM Labs  - PT/OT/WBAT  - Notify Orthopedics with any questions  
67-year-old female w/ no PMHx p/w  mechanical Fall c/b Right femoral neck fracture now s/p right total hip arthroplasty on 5/31/23
A/P  Patient S/P R total hip arthroplasty DOS 5/31/23.  VSS. NAD.  PT/OT-WBAT RLE   Anterior precautions   IS  DVT PPx: Eliquis   Pain Control  Dispo planning: pending PT marta

## 2023-06-01 NOTE — DISCHARGE NOTE NURSING/CASE MANAGEMENT/SOCIAL WORK - NSDCPECAREGIVERED_GEN_ALL_CORE
Medline and carenotes for surgical procedure Post THR and Precautions, Hip fracture, Incision care, Pain management, Eliquis, as well as DC Medications and side effects literature for patient reference.

## 2023-06-01 NOTE — OCCUPATIONAL THERAPY INITIAL EVALUATION ADULT - DIAGNOSIS, OT EVAL
s/p right femoral neck fracture, s/p right total hip arthroplasty; decreased functional mobility, decreased ADL performance

## 2023-06-01 NOTE — PROGRESS NOTE ADULT - SUBJECTIVE AND OBJECTIVE BOX
Patient is a 67y old  Female who presents with a chief complaint of s/p right DAREK (31 May 2023 20:35)      SUBJECTIVE / OVERNIGHT EVENTS:    MEDICATIONS  (STANDING):  acetaminophen     Tablet .. 975 milliGRAM(s) Oral every 8 hours  apixaban 2.5 milliGRAM(s) Oral every 12 hours  pantoprazole    Tablet 40 milliGRAM(s) Oral before breakfast  polyethylene glycol 3350 17 Gram(s) Oral at bedtime  senna 2 Tablet(s) Oral at bedtime    MEDICATIONS  (PRN):  magnesium hydroxide Suspension 30 milliLiter(s) Oral daily PRN Constipation  oxyCODONE    IR 5 milliGRAM(s) Oral every 4 hours PRN Mild Pain (1 - 3)  oxyCODONE    IR 10 milliGRAM(s) Oral every 4 hours PRN Moderate Pain (4 - 6)  oxyCODONE    IR 5 milliGRAM(s) Oral every 3 hours PRN Moderate Pain (4 - 6)  oxyCODONE    IR 10 milliGRAM(s) Oral every 3 hours PRN Severe Pain (7 - 10)  traMADol 50 milliGRAM(s) Oral every 6 hours PRN Mild Pain (1 - 3)      Vital Signs Last 24 Hrs  T(C): 36.9 (01 Jun 2023 09:59), Max: 37.1 (31 May 2023 12:15)  T(F): 98.4 (01 Jun 2023 09:59), Max: 98.8 (31 May 2023 12:15)  HR: 85 (01 Jun 2023 09:59) (61 - 97)  BP: 108/69 (01 Jun 2023 09:59) (98/50 - 138/67)  BP(mean): 71 (31 May 2023 21:00) (61 - 90)  RR: 18 (01 Jun 2023 09:59) (12 - 20)  SpO2: 98% (01 Jun 2023 09:59) (93% - 100%)    Parameters below as of 01 Jun 2023 09:59  Patient On (Oxygen Delivery Method): room air      CAPILLARY BLOOD GLUCOSE      POCT Blood Glucose.: 90 mg/dL (31 May 2023 13:41)    I&O's Summary    31 May 2023 07:01  -  01 Jun 2023 07:00  --------------------------------------------------------  IN: 50 mL / OUT: 650 mL / NET: -600 mL    01 Jun 2023 07:01  -  01 Jun 2023 10:38  --------------------------------------------------------  IN: 0 mL / OUT: 400 mL / NET: -400 mL        PHYSICAL EXAM:  GENERAL: NAD, well-developed  HEAD:  Atraumatic, Normocephalic  EYES: EOMI, PERRLA, conjunctiva and sclera clear  NECK: Supple, No JVD  CHEST/LUNG: Clear to auscultation bilaterally; No wheeze  HEART: Regular rate and rhythm; No murmurs, rubs, or gallops  ABDOMEN: Soft, Nontender, Nondistended; Bowel sounds present  EXTREMITIES:  2+ Peripheral Pulses, No clubbing, cyanosis, or edema  PSYCH: AAOx3  NEUROLOGY: non-focal  SKIN: No rashes or lesions    LABS:                        10.7   8.30  )-----------( 158      ( 01 Jun 2023 07:57 )             34.8     06-01    135  |  107  |  10  ----------------------------<  111<H>  4.1   |  18<L>  |  0.61    Ca    8.9      01 Jun 2023 07:57    TPro  6.9  /  Alb  4.1  /  TBili  0.5  /  DBili  x   /  AST  36<H>  /  ALT  25  /  AlkPhos  83  05-30    PT/INR - ( 31 May 2023 02:30 )   PT: 12.3 sec;   INR: 1.06 ratio         PTT - ( 31 May 2023 02:30 )  PTT:26.2 sec          RADIOLOGY & ADDITIONAL TESTS:    Imaging Personally Reviewed: < from: Xray Pelvis AP only (05.31.23 @ 18:26) >  IMPRESSION:  Cementless right total hip prosthesis with screw fixated acetabular cup   implanted.    Intact and aligned hardware and no periprosthetic fractures.    Postoperative soft tissue changes.    Surgical skin staples overlie operative site.    Correlate with intraoperative findings.    < end of copied text >      Consultant(s) Notes Reviewed:      Care Discussed with Consultants/Other Providers:   Patient is a 67y old  Female who presents with a chief complaint of s/p right DAREK (31 May 2023 20:35)      SUBJECTIVE / OVERNIGHT EVENTS:  Patient has no new complaints. Denies cp, SOB, abdominal pain, N/V/D     MEDICATIONS  (STANDING):  acetaminophen     Tablet .. 975 milliGRAM(s) Oral every 8 hours  apixaban 2.5 milliGRAM(s) Oral every 12 hours  pantoprazole    Tablet 40 milliGRAM(s) Oral before breakfast  polyethylene glycol 3350 17 Gram(s) Oral at bedtime  senna 2 Tablet(s) Oral at bedtime    MEDICATIONS  (PRN):  magnesium hydroxide Suspension 30 milliLiter(s) Oral daily PRN Constipation  oxyCODONE    IR 5 milliGRAM(s) Oral every 4 hours PRN Mild Pain (1 - 3)  oxyCODONE    IR 10 milliGRAM(s) Oral every 4 hours PRN Moderate Pain (4 - 6)  oxyCODONE    IR 5 milliGRAM(s) Oral every 3 hours PRN Moderate Pain (4 - 6)  oxyCODONE    IR 10 milliGRAM(s) Oral every 3 hours PRN Severe Pain (7 - 10)  traMADol 50 milliGRAM(s) Oral every 6 hours PRN Mild Pain (1 - 3)      Vital Signs Last 24 Hrs  T(C): 36.9 (01 Jun 2023 09:59), Max: 37.1 (31 May 2023 12:15)  T(F): 98.4 (01 Jun 2023 09:59), Max: 98.8 (31 May 2023 12:15)  HR: 85 (01 Jun 2023 09:59) (61 - 97)  BP: 108/69 (01 Jun 2023 09:59) (98/50 - 138/67)  BP(mean): 71 (31 May 2023 21:00) (61 - 90)  RR: 18 (01 Jun 2023 09:59) (12 - 20)  SpO2: 98% (01 Jun 2023 09:59) (93% - 100%)    Parameters below as of 01 Jun 2023 09:59  Patient On (Oxygen Delivery Method): room air      CAPILLARY BLOOD GLUCOSE      POCT Blood Glucose.: 90 mg/dL (31 May 2023 13:41)    I&O's Summary    31 May 2023 07:01  -  01 Jun 2023 07:00  --------------------------------------------------------  IN: 50 mL / OUT: 650 mL / NET: -600 mL    01 Jun 2023 07:01  -  01 Jun 2023 10:38  --------------------------------------------------------  IN: 0 mL / OUT: 400 mL / NET: -400 mL        PHYSICAL EXAM:  GENERAL: NAD, well-developed  HEAD:  Atraumatic, Normocephalic  EYES: EOMI, PERRLA, conjunctiva and sclera clear  NECK: Supple, No JVD  CHEST/LUNG: Clear to auscultation bilaterally; No wheeze  HEART: Regular rate and rhythm; No murmurs, rubs, or gallops  ABDOMEN: Soft, Nontender, Nondistended; Bowel sounds present  EXTREMITIES:  2+ Peripheral Pulses, No clubbing, cyanosis, or edema  PSYCH: AAOx3  NEUROLOGY: non-focal  SKIN: No rashes or lesions    LABS:                        10.7   8.30  )-----------( 158      ( 01 Jun 2023 07:57 )             34.8     06-01    135  |  107  |  10  ----------------------------<  111<H>  4.1   |  18<L>  |  0.61    Ca    8.9      01 Jun 2023 07:57    TPro  6.9  /  Alb  4.1  /  TBili  0.5  /  DBili  x   /  AST  36<H>  /  ALT  25  /  AlkPhos  83  05-30    PT/INR - ( 31 May 2023 02:30 )   PT: 12.3 sec;   INR: 1.06 ratio         PTT - ( 31 May 2023 02:30 )  PTT:26.2 sec          RADIOLOGY & ADDITIONAL TESTS:    Imaging Personally Reviewed: < from: Xray Pelvis AP only (05.31.23 @ 18:26) >  IMPRESSION:  Cementless right total hip prosthesis with screw fixated acetabular cup   implanted.    Intact and aligned hardware and no periprosthetic fractures.    Postoperative soft tissue changes.    Surgical skin staples overlie operative site.    Correlate with intraoperative findings.    < end of copied text >      Consultant(s) Notes Reviewed:      Care Discussed with Consultants/Other Providers:

## 2023-06-01 NOTE — PROGRESS NOTE ADULT - PROBLEM SELECTOR PLAN 1
s/p right total hip arthroplasty on 5/31/23  management as per ortho  Pain control w/ Tylenol/ Oxy IR prn  PT eval s/p right total hip arthroplasty on 5/31/23  management as per ortho  Pain control w/ Tylenol/ Oxy IR prn  encouraged incentive spirometry and PT  PT rec rehab

## 2023-06-01 NOTE — DISCHARGE NOTE NURSING/CASE MANAGEMENT/SOCIAL WORK - NSDPDISTO_GEN_ALL_CORE
Pt. is afebrile and offers no complaints. In no acute distress. Right hip dressing: clean, dry and intact. Pt is ambulating with a walker, tolerating diet well, and voiding in adequate amounts./Sub-Acute rehab

## 2023-06-01 NOTE — DISCHARGE NOTE NURSING/CASE MANAGEMENT/SOCIAL WORK - NSDCPNINST_GEN_ALL_CORE
Maintain hip incision and dressing clean dry and intact. Call MD with any signs of infection ie fever, redness, drainage, or with signs of bleeding, unrelieved increased pain, or persistent nausea. Continue to drink plenty of fluids. Avoid strenuous activity and constipation which may be a side effect from taking certain medications and narcotics.  ANTERIOR Total hip precautions reviewed with patient, safety and fall prevention measures reinforced.

## 2023-06-01 NOTE — DISCHARGE NOTE NURSING/CASE MANAGEMENT/SOCIAL WORK - NSDCPEFALRISK_GEN_ALL_CORE
For information on Fall & Injury Prevention, visit: [email or web address] OR  [email or web address] OR  https://www.cdc.gov/steadi/patient.html

## 2023-06-01 NOTE — PROGRESS NOTE ADULT - SUBJECTIVE AND OBJECTIVE BOX
Orthopedic Progress Note     S:  No acute events overnight, pain is well controlled.  Patient denies any chest pain, SOB, N/V, fevers/chills. Motivated to mobilize with physical therapy today     T(C): 36.9 (06-01-23 @ 05:37), Max: 37.2 (05-31-23 @ 09:17)  HR: 79 (06-01-23 @ 05:37) (61 - 97)  BP: 106/77 (06-01-23 @ 05:37) (98/50 - 138/67)  RR: 17 (06-01-23 @ 05:37) (12 - 20)  SpO2: 99% (06-01-23 @ 05:37) (93% - 100%)  Wt(kg): --I&O's Summary    30 May 2023 07:01  -  31 May 2023 07:00  --------------------------------------------------------  IN: 0 mL / OUT: 300 mL / NET: -300 mL    31 May 2023 07:01  -  01 Jun 2023 06:47  --------------------------------------------------------  IN: 50 mL / OUT: 650 mL / NET: -600 mL        O:  Physical exam:  Gen: Alert and Oriented x3, No Acute Distress  Right lower EXT:            Dressing: c/d/i            Motor: EHL FHL GA TA SOL in tact             Sensation: SILT throughout            Pulses: 2+ DP           Labs:                        13.3   11.49 )-----------( 188      ( 31 May 2023 18:20 )             42.1    05-31    137  |  105  |  9   ----------------------------<  124<H>  3.9   |  21<L>  |  0.61    Ca    8.5      31 May 2023 02:30    TPro  6.9  /  Alb  4.1  /  TBili  0.5  /  DBili  x   /  AST  36<H>  /  ALT  25  /  AlkPhos  83  05-30

## 2023-06-01 NOTE — OCCUPATIONAL THERAPY INITIAL EVALUATION ADULT - PERTINENT HX OF CURRENT PROBLEM, REHAB EVAL
67 year old female presenting s/p mechanical fall. Xray right hip revealing right femoral neck fracture now s/p right total hip arthroplasty on 5/31/23.

## 2023-06-01 NOTE — PHYSICAL THERAPY INITIAL EVALUATION ADULT - PERTINENT HX OF CURRENT PROBLEM, REHAB EVAL
Patient is 67-year-old female w/ no PMHx, presents with mechanical Fall,  Right femoral neck fracture now s/p right total hip arthroplasty on 5/31/23

## 2023-06-01 NOTE — PROGRESS NOTE ADULT - ATTENDING COMMENTS
R FN fx.  For DAREK.  R/B/A discussed
PT. DVT ppx. f/u labs. Dc planning
PT. DVT ppx. f/u labs. Dc planning

## 2023-06-02 ENCOUNTER — NON-APPOINTMENT (OUTPATIENT)
Age: 68
End: 2023-06-02

## 2023-06-06 RX ORDER — ONDANSETRON 8 MG/1
1 TABLET, FILM COATED ORAL
Refills: 0 | DISCHARGE
Start: 2023-06-06

## 2023-06-07 ENCOUNTER — INPATIENT (INPATIENT)
Facility: HOSPITAL | Age: 68
LOS: 7 days | Discharge: NURSING, REHAB OR LONG-TERM CARE | DRG: 394 | End: 2023-06-15
Attending: HOSPITALIST | Admitting: HOSPITALIST
Payer: MEDICARE

## 2023-06-07 VITALS
HEART RATE: 80 BPM | TEMPERATURE: 96 F | RESPIRATION RATE: 17 BRPM | DIASTOLIC BLOOD PRESSURE: 74 MMHG | SYSTOLIC BLOOD PRESSURE: 114 MMHG | WEIGHT: 132.06 LBS | HEIGHT: 59 IN | OXYGEN SATURATION: 95 %

## 2023-06-07 DIAGNOSIS — R11.2 NAUSEA WITH VOMITING, UNSPECIFIED: ICD-10-CM

## 2023-06-07 DIAGNOSIS — Z29.9 ENCOUNTER FOR PROPHYLACTIC MEASURES, UNSPECIFIED: ICD-10-CM

## 2023-06-07 DIAGNOSIS — K52.9 NONINFECTIVE GASTROENTERITIS AND COLITIS, UNSPECIFIED: ICD-10-CM

## 2023-06-07 DIAGNOSIS — Z98.890 OTHER SPECIFIED POSTPROCEDURAL STATES: Chronic | ICD-10-CM

## 2023-06-07 DIAGNOSIS — Z90.711 ACQUIRED ABSENCE OF UTERUS WITH REMAINING CERVICAL STUMP: Chronic | ICD-10-CM

## 2023-06-07 DIAGNOSIS — Z98.890 OTHER SPECIFIED POSTPROCEDURAL STATES: ICD-10-CM

## 2023-06-07 LAB
ALBUMIN SERPL ELPH-MCNC: 2.9 G/DL — LOW (ref 3.3–5)
ALP SERPL-CCNC: 108 U/L — SIGNIFICANT CHANGE UP (ref 40–120)
ALT FLD-CCNC: 13 U/L — SIGNIFICANT CHANGE UP (ref 10–45)
ANION GAP SERPL CALC-SCNC: 14 MMOL/L — SIGNIFICANT CHANGE UP (ref 5–17)
APPEARANCE UR: ABNORMAL
APTT BLD: 25.8 SEC — LOW (ref 27.5–35.5)
AST SERPL-CCNC: 15 U/L — SIGNIFICANT CHANGE UP (ref 10–40)
BACTERIA # UR AUTO: NEGATIVE — SIGNIFICANT CHANGE UP
BASE EXCESS BLDV CALC-SCNC: 5.9 MMOL/L — HIGH (ref -2–3)
BASOPHILS # BLD AUTO: 0.02 K/UL — SIGNIFICANT CHANGE UP (ref 0–0.2)
BASOPHILS NFR BLD AUTO: 0.3 % — SIGNIFICANT CHANGE UP (ref 0–2)
BILIRUB SERPL-MCNC: 1.5 MG/DL — HIGH (ref 0.2–1.2)
BILIRUB UR-MCNC: NEGATIVE — SIGNIFICANT CHANGE UP
BUN SERPL-MCNC: 20 MG/DL — SIGNIFICANT CHANGE UP (ref 7–23)
CA-I SERPL-SCNC: 1.15 MMOL/L — SIGNIFICANT CHANGE UP (ref 1.15–1.33)
CALCIUM SERPL-MCNC: 8.8 MG/DL — SIGNIFICANT CHANGE UP (ref 8.4–10.5)
CHLORIDE BLDV-SCNC: 103 MMOL/L — SIGNIFICANT CHANGE UP (ref 96–108)
CHLORIDE SERPL-SCNC: 101 MMOL/L — SIGNIFICANT CHANGE UP (ref 96–108)
CO2 BLDV-SCNC: 32 MMOL/L — HIGH (ref 22–26)
CO2 SERPL-SCNC: 26 MMOL/L — SIGNIFICANT CHANGE UP (ref 22–31)
COLOR SPEC: YELLOW — SIGNIFICANT CHANGE UP
CREAT SERPL-MCNC: 0.58 MG/DL — SIGNIFICANT CHANGE UP (ref 0.5–1.3)
DIFF PNL FLD: NEGATIVE — SIGNIFICANT CHANGE UP
EGFR: 100 ML/MIN/1.73M2 — SIGNIFICANT CHANGE UP
EOSINOPHIL # BLD AUTO: 0.07 K/UL — SIGNIFICANT CHANGE UP (ref 0–0.5)
EOSINOPHIL NFR BLD AUTO: 1.2 % — SIGNIFICANT CHANGE UP (ref 0–6)
EPI CELLS # UR: 2 /HPF — SIGNIFICANT CHANGE UP
GAS PNL BLDV: 136 MMOL/L — SIGNIFICANT CHANGE UP (ref 136–145)
GAS PNL BLDV: SIGNIFICANT CHANGE UP
GAS PNL BLDV: SIGNIFICANT CHANGE UP
GLUCOSE BLDV-MCNC: 106 MG/DL — HIGH (ref 70–99)
GLUCOSE SERPL-MCNC: 102 MG/DL — HIGH (ref 70–99)
GLUCOSE UR QL: NEGATIVE — SIGNIFICANT CHANGE UP
HCO3 BLDV-SCNC: 31 MMOL/L — HIGH (ref 22–29)
HCT VFR BLD CALC: 25.4 % — LOW (ref 34.5–45)
HCT VFR BLDA CALC: 22 % — LOW (ref 34.5–46.5)
HGB BLD CALC-MCNC: 7.4 G/DL — LOW (ref 11.7–16.1)
HGB BLD-MCNC: 8.2 G/DL — LOW (ref 11.5–15.5)
HYALINE CASTS # UR AUTO: 0 /LPF — SIGNIFICANT CHANGE UP (ref 0–2)
IMM GRANULOCYTES NFR BLD AUTO: 1 % — HIGH (ref 0–0.9)
INR BLD: 1.49 RATIO — HIGH (ref 0.88–1.16)
KETONES UR-MCNC: ABNORMAL
LACTATE BLDV-MCNC: 1.1 MMOL/L — SIGNIFICANT CHANGE UP (ref 0.5–2)
LEUKOCYTE ESTERASE UR-ACNC: NEGATIVE — SIGNIFICANT CHANGE UP
LIDOCAIN IGE QN: 27 U/L — SIGNIFICANT CHANGE UP (ref 7–60)
LYMPHOCYTES # BLD AUTO: 1.03 K/UL — SIGNIFICANT CHANGE UP (ref 1–3.3)
LYMPHOCYTES # BLD AUTO: 17.5 % — SIGNIFICANT CHANGE UP (ref 13–44)
MCHC RBC-ENTMCNC: 30.3 PG — SIGNIFICANT CHANGE UP (ref 27–34)
MCHC RBC-ENTMCNC: 32.3 GM/DL — SIGNIFICANT CHANGE UP (ref 32–36)
MCV RBC AUTO: 93.7 FL — SIGNIFICANT CHANGE UP (ref 80–100)
MONOCYTES # BLD AUTO: 0.58 K/UL — SIGNIFICANT CHANGE UP (ref 0–0.9)
MONOCYTES NFR BLD AUTO: 9.8 % — SIGNIFICANT CHANGE UP (ref 2–14)
NEUTROPHILS # BLD AUTO: 4.14 K/UL — SIGNIFICANT CHANGE UP (ref 1.8–7.4)
NEUTROPHILS NFR BLD AUTO: 70.2 % — SIGNIFICANT CHANGE UP (ref 43–77)
NITRITE UR-MCNC: NEGATIVE — SIGNIFICANT CHANGE UP
NRBC # BLD: 0 /100 WBCS — SIGNIFICANT CHANGE UP (ref 0–0)
OB PNL STL: NEGATIVE — SIGNIFICANT CHANGE UP
PCO2 BLDV: 45 MMHG — HIGH (ref 39–42)
PH BLDV: 7.44 — HIGH (ref 7.32–7.43)
PH UR: 7 — SIGNIFICANT CHANGE UP (ref 5–8)
PLATELET # BLD AUTO: 336 K/UL — SIGNIFICANT CHANGE UP (ref 150–400)
PO2 BLDV: 35 MMHG — SIGNIFICANT CHANGE UP (ref 25–45)
POTASSIUM BLDV-SCNC: 2.9 MMOL/L — CRITICAL LOW (ref 3.5–5.1)
POTASSIUM SERPL-MCNC: 3 MMOL/L — LOW (ref 3.5–5.3)
POTASSIUM SERPL-SCNC: 3 MMOL/L — LOW (ref 3.5–5.3)
PROT SERPL-MCNC: 5.8 G/DL — LOW (ref 6–8.3)
PROT UR-MCNC: ABNORMAL
PROTHROM AB SERPL-ACNC: 17.4 SEC — HIGH (ref 10.5–13.4)
RBC # BLD: 2.71 M/UL — LOW (ref 3.8–5.2)
RBC # FLD: 13.1 % — SIGNIFICANT CHANGE UP (ref 10.3–14.5)
RBC CASTS # UR COMP ASSIST: 2 /HPF — SIGNIFICANT CHANGE UP (ref 0–4)
SAO2 % BLDV: 56.9 % — LOW (ref 67–88)
SODIUM SERPL-SCNC: 141 MMOL/L — SIGNIFICANT CHANGE UP (ref 135–145)
SP GR SPEC: 1.03 — HIGH (ref 1.01–1.02)
UROBILINOGEN FLD QL: ABNORMAL
WBC # BLD: 5.9 K/UL — SIGNIFICANT CHANGE UP (ref 3.8–10.5)
WBC # FLD AUTO: 5.9 K/UL — SIGNIFICANT CHANGE UP (ref 3.8–10.5)
WBC UR QL: 1 /HPF — SIGNIFICANT CHANGE UP (ref 0–5)

## 2023-06-07 PROCEDURE — 99285 EMERGENCY DEPT VISIT HI MDM: CPT | Mod: FS

## 2023-06-07 PROCEDURE — 74177 CT ABD & PELVIS W/CONTRAST: CPT | Mod: 26,MA

## 2023-06-07 PROCEDURE — 99223 1ST HOSP IP/OBS HIGH 75: CPT | Mod: GC

## 2023-06-07 RX ORDER — ACETAMINOPHEN 500 MG
1000 TABLET ORAL ONCE
Refills: 0 | Status: COMPLETED | OUTPATIENT
Start: 2023-06-07 | End: 2023-06-07

## 2023-06-07 RX ORDER — POLYETHYLENE GLYCOL 3350 17 G/17G
17 POWDER, FOR SOLUTION ORAL
Refills: 0 | DISCHARGE

## 2023-06-07 RX ORDER — SODIUM CHLORIDE 9 MG/ML
1000 INJECTION INTRAMUSCULAR; INTRAVENOUS; SUBCUTANEOUS ONCE
Refills: 0 | Status: COMPLETED | OUTPATIENT
Start: 2023-06-07 | End: 2023-06-07

## 2023-06-07 RX ORDER — AMPICILLIN SODIUM AND SULBACTAM SODIUM 250; 125 MG/ML; MG/ML
3 INJECTION, POWDER, FOR SUSPENSION INTRAMUSCULAR; INTRAVENOUS EVERY 6 HOURS
Refills: 0 | Status: DISCONTINUED | OUTPATIENT
Start: 2023-06-08 | End: 2023-06-08

## 2023-06-07 RX ORDER — POTASSIUM CHLORIDE 20 MEQ
10 PACKET (EA) ORAL
Refills: 0 | Status: COMPLETED | OUTPATIENT
Start: 2023-06-07 | End: 2023-06-07

## 2023-06-07 RX ORDER — PANTOPRAZOLE SODIUM 20 MG/1
40 TABLET, DELAYED RELEASE ORAL
Refills: 0 | Status: DISCONTINUED | OUTPATIENT
Start: 2023-06-07 | End: 2023-06-08

## 2023-06-07 RX ORDER — ACETAMINOPHEN 500 MG
975 TABLET ORAL EVERY 6 HOURS
Refills: 0 | Status: DISCONTINUED | OUTPATIENT
Start: 2023-06-07 | End: 2023-06-14

## 2023-06-07 RX ORDER — SODIUM CHLORIDE 9 MG/ML
1000 INJECTION INTRAMUSCULAR; INTRAVENOUS; SUBCUTANEOUS
Refills: 0 | Status: DISCONTINUED | OUTPATIENT
Start: 2023-06-07 | End: 2023-06-08

## 2023-06-07 RX ORDER — POLYETHYLENE GLYCOL 3350 17 G/17G
17 POWDER, FOR SOLUTION ORAL DAILY
Refills: 0 | Status: DISCONTINUED | OUTPATIENT
Start: 2023-06-07 | End: 2023-06-15

## 2023-06-07 RX ORDER — ACETAMINOPHEN 500 MG
650 TABLET ORAL EVERY 6 HOURS
Refills: 0 | Status: DISCONTINUED | OUTPATIENT
Start: 2023-06-07 | End: 2023-06-15

## 2023-06-07 RX ORDER — ACETAMINOPHEN 500 MG
3 TABLET ORAL
Refills: 0 | DISCHARGE

## 2023-06-07 RX ORDER — KETOROLAC TROMETHAMINE 30 MG/ML
15 SYRINGE (ML) INJECTION ONCE
Refills: 0 | Status: DISCONTINUED | OUTPATIENT
Start: 2023-06-07 | End: 2023-06-07

## 2023-06-07 RX ORDER — ONDANSETRON 8 MG/1
4 TABLET, FILM COATED ORAL ONCE
Refills: 0 | Status: COMPLETED | OUTPATIENT
Start: 2023-06-07 | End: 2023-06-07

## 2023-06-07 RX ORDER — AMPICILLIN SODIUM AND SULBACTAM SODIUM 250; 125 MG/ML; MG/ML
3 INJECTION, POWDER, FOR SUSPENSION INTRAMUSCULAR; INTRAVENOUS ONCE
Refills: 0 | Status: COMPLETED | OUTPATIENT
Start: 2023-06-07 | End: 2023-06-07

## 2023-06-07 RX ORDER — AMPICILLIN SODIUM AND SULBACTAM SODIUM 250; 125 MG/ML; MG/ML
INJECTION, POWDER, FOR SUSPENSION INTRAMUSCULAR; INTRAVENOUS
Refills: 0 | Status: DISCONTINUED | OUTPATIENT
Start: 2023-06-07 | End: 2023-06-08

## 2023-06-07 RX ADMIN — ONDANSETRON 4 MILLIGRAM(S): 8 TABLET, FILM COATED ORAL at 12:58

## 2023-06-07 RX ADMIN — Medication 100 MILLIEQUIVALENT(S): at 18:47

## 2023-06-07 RX ADMIN — AMPICILLIN SODIUM AND SULBACTAM SODIUM 200 GRAM(S): 250; 125 INJECTION, POWDER, FOR SUSPENSION INTRAMUSCULAR; INTRAVENOUS at 19:24

## 2023-06-07 RX ADMIN — Medication 15 MILLIGRAM(S): at 20:40

## 2023-06-07 RX ADMIN — AMPICILLIN SODIUM AND SULBACTAM SODIUM 200 GRAM(S): 250; 125 INJECTION, POWDER, FOR SUSPENSION INTRAMUSCULAR; INTRAVENOUS at 23:07

## 2023-06-07 RX ADMIN — Medication 100 MILLIEQUIVALENT(S): at 15:32

## 2023-06-07 RX ADMIN — Medication 100 MILLIEQUIVALENT(S): at 17:06

## 2023-06-07 RX ADMIN — SODIUM CHLORIDE 1000 MILLILITER(S): 9 INJECTION INTRAMUSCULAR; INTRAVENOUS; SUBCUTANEOUS at 13:00

## 2023-06-07 RX ADMIN — Medication 15 MILLIGRAM(S): at 19:37

## 2023-06-07 RX ADMIN — Medication 400 MILLIGRAM(S): at 12:59

## 2023-06-07 RX ADMIN — SODIUM CHLORIDE 100 MILLILITER(S): 9 INJECTION INTRAMUSCULAR; INTRAVENOUS; SUBCUTANEOUS at 15:35

## 2023-06-07 NOTE — ED PROVIDER NOTE - PHYSICAL EXAMINATION
Patient is well appearing adult female in moderate distress. awake, alert and oriented x 3. NCAT, PERRLA, EOMI, Neck is supple, No LAD, CTA B/L with no respiratory distress,+S1S2 no murmurs. Abdomen distended diffusely tender without guarding. RLE hip limited ROM (s/p OR) remaining extremities with FROM., no edema or calf tenderness. Skin pale and warm, right hip surgical site appears healing well with dressing c/d/i. Neuro CN II-XII grossly intact. Strength 5/5UE/LE. NmlSensation. Patient is well appearing adult female in moderate distress. awake, alert and oriented x 3. NCAT, PERRLA, EOMI, Neck is supple, No LAD, CTA B/L with no respiratory distress,+S1S2 no murmurs. Abdomen distended diffusely tender without guarding. Normal rectal tone, light brown stool without BRBPR, no palpable stool in rectal vault (Chaperone PCA Ophelia). RLE hip limited ROM (s/p OR) remaining extremities with FROM., no edema or calf tenderness. Skin pale and warm, right hip surgical site appears healing well with dressing c/d/i. Neuro CN II-XII grossly intact. Strength 5/5UE/LE. NmlSensation. Patient is well appearing adult female in moderate distress. awake, alert and oriented x 3. NCAT, PERRLA, EOMI, Neck is supple, No LAD, CTA B/L with no respiratory distress,+S1S2 no murmurs. Abdomen distended diffusely tender without guarding. Normal rectal tone, light brown stool without BRBPR, no palpable stool in rectal vault (Chaperone SCA Ophelia). RLE hip limited ROM (s/p OR) remaining extremities with FROM., no edema or calf tenderness. Skin pale and warm, right hip surgical site appears healing well with dressing c/d/i. Neuro CN II-XII grossly intact. Strength 5/5UE/LE. NmlSensation.

## 2023-06-07 NOTE — ED PROVIDER NOTE - CARE PLAN
1 Principal Discharge DX:	Colitis  Secondary Diagnosis:	Hypokalemia  Secondary Diagnosis:	Nausea and vomiting

## 2023-06-07 NOTE — ED ADULT NURSE NOTE - NSFALLHARMRISKINTERV_ED_ALL_ED
Assistance OOB with selected safe patient handling equipment if applicable/Assistance with ambulation/Communicate risk of Fall with Harm to all staff, patient, and family/Monitor gait and stability/Provide visual cue: red socks, yellow wristband, yellow gown, etc/Reinforce activity limits and safety measures with patient and family/Bed in lowest position, wheels locked, appropriate side rails in place/Call bell, personal items and telephone in reach/Instruct patient to call for assistance before getting out of bed/chair/stretcher/Non-slip footwear applied when patient is off stretcher/[place] to call system/Physically safe environment - no spills, clutter or unnecessary equipment/Purposeful Proactive Rounding/Room/bathroom lighting operational, light cord in reach

## 2023-06-07 NOTE — H&P ADULT - NSHPLABSRESULTS_GEN_ALL_CORE
8.2    5.90  )-----------( 336      ( 2023 13:27 )             25.4       06-07    141  |  101  |  20  ----------------------------<  102<H>  3.0<L>   |  26  |  0.58    Ca    8.8      2023 13:27  Mg     2.3     06-07    TPro  5.8<L>  /  Alb  2.9<L>  /  TBili  1.5<H>  /  DBili  x   /  AST  15  /  ALT  13  /  AlkPhos  108  06-07              Urinalysis Basic - ( 2023 15:49 )    Color: Yellow / Appearance: Slightly Turbid / S.026 / pH: x  Gluc: x / Ketone: Small  / Bili: Negative / Urobili: 3 mg/dL   Blood: x / Protein: Trace / Nitrite: Negative   Leuk Esterase: Negative / RBC: 2 /hpf / WBC 1 /HPF   Sq Epi: x / Non Sq Epi: x / Bacteria: Negative        PT/INR - ( 2023 13:27 )   PT: 17.4 sec;   INR: 1.49 ratio         PTT - ( 2023 13:27 )  PTT:25.8 sec    Lactate Trend            CAPILLARY BLOOD GLUCOSE

## 2023-06-07 NOTE — H&P ADULT - ATTENDING COMMENTS
67 year old female with a PMHx of right femoral neck fracture from mechanical fall s/p total hip arthroplasty on 5/31/23 (at [facility]) who presents with nausea, vomiting, inability to tolerate PO input. She was found to have post operative ileus. Her evaluation is positive for hypokalemia to 3.0, for which she was given KCl 10meq IV x3. Her CT is negative for bowel obstruction, perforation, or significant stool burden. She does have mild submucosal deposition of fat in the colon, unclear significance, and a superior rami fracture, new from prior scans. Pt denied having any additional falls at [facility].      On exam, the patient was in no acute distress. On exam, she has diffuse abdominal tenderness but more so in the right upper / lower quadrants.      #postoperative ileus   #recent RHA for right femoral neck fracture     -agree with limiting opioid use given postoperative ileus. Discussed with the patient plan for symptom management with acetaminophen only for now. Pt is onboard   -ambulate, with assistance, as tolerated   -fall precautions   -outpt evaluation for osteoporosis   -bowel regimen   -replace lytes   -no clear evidence of infectious colitis based on clinical exam. Will hold off on further antibiotics   -clear liquid diet   -appreciate surgery’s input     Rest of plan as per resident note.

## 2023-06-07 NOTE — H&P ADULT - PROBLEM SELECTOR PLAN 7
Diet: NPO  Psych/Sleep: None MARTHA  GI: Protonix 40 qd, Miralax Daily  DVT ppx: Heparin q8h  Code Status: Full Code  Dispo: Pending Bowel Movement/GI workup

## 2023-06-07 NOTE — ED CLERICAL - NS ED CLERK NOTE PRE-ARRIVAL INFORMATION; ADDITIONAL PRE-ARRIVAL INFORMATION
This patient is enrolled in the comprehensive joint replacement (CJR) program and has active care navigation.  This patient can be followed up by the care navigation team within 24 hours. To arrange close follow-up or to obtain additional clinical information about this patient, please call the contact number above. Please call the orthopedic resident ([phone]) for ALL patients who are admitted or placed in observation.

## 2023-06-07 NOTE — H&P ADULT - ASSESSMENT
67F hx of osteoarthritis, hysterectomy, recent pelvic fx s/p ORIF is here today for vomiting with CT A/P concerning for possible colitis vs underlying lesion 67F hx of osteoarthritis, hysterectomy, recent pelvic fx s/p DAREK is here today for vomiting with CT A/P concerning for possible colitis vs underlying lesion 67F hx of osteoarthritis, hysterectomy, recent pelvic fx s/p R DAREK is here today for vomiting with CT A/P concerning for likely postop ileus with CT A/P Showing evidence of possible colitis vs lesion. postop ileus is most likely given recent DAREK, lack of food intake, as well as minimal ambulation with plan for nonoperative management with IV fluids and bowel rest.

## 2023-06-07 NOTE — H&P ADULT - PROBLEM SELECTOR PLAN 5
Patient had recent mechanical fall; was seen at [facility]  Underwent Right Total Hip Arthroplasty  DVT ppx on 2.5mg Eliquis for 6 weeks    Holding Eliquis given possible procedure  Limiting Opioid regiment until BM Patient reported 6-7/10 pain after postop with improvement with protonix down to 3-4/10 during the visit  No concerning peritoneal signs    Plan:  C/W IV Protonix Patient had recent mechanical fall; was seen at [facility]  Underwent Right Total Hip Arthroplasty  DVT ppx on 2.5mg Eliquis for 6 weeks    Holding Eliquis given possible procedure  Limiting Opioid regiment until BM    PT Evaluation  Baseline Functional status: Independent; ambulatory Patient had recent mechanical fall; was seen at [facility]  Underwent Right Total Hip Arthroplasty  DVT ppx on 2.5mg Eliquis for 6 weeks    Holding Eliquis for now in case procedure  Limiting Opioid regiment until BM  Hold Toradol given epigastric pain    PT Evaluation  Baseline Functional status: Independent; ambulatory

## 2023-06-07 NOTE — H&P ADULT - PROBLEM SELECTOR PLAN 1
2 day hx of nonbloody nonbilious emesis    XR with dilated Bowel loops 2 day hx of nonbloody nonbilious emesis  XR with dilated Bowel loops  Given Recent Surgery GHULAM; consider post-op ileus given     GI Consulted; await Recs a/p ED    Concern for SBO  Appreciate Gen Surg Recommendations:  - NPO  - Bowel Regiment  - Electrolytes   - Daily Miralax  - Minimize opioids Patient most likely has postop ileus given recent procedure  Partial Obstruction given patient reports passing flatus  No indication for NG Tube given vomiting is minimal and without significant abd distension    Plan:  - IV Zofran as needed    Appreciate Gen Surg Recommendations:  - NPO  - Bowel Regiment  - Maintenance Fluids 100cc/hr NS  - Lyte Repletion as needed  - Daily Miralax  - Minimize opioids    GI Consulted; await Recs 2 day hx of nonbloody nonbilious emesis with only loose diarrhea/stool since OR  XR with dilated Bowel loops concerning for ileus; CT Scan ruled out SBO; showed some possible signs of colitis/ underlying lesion  Patient most likely has postop ileus w/ only partial Obstruction given patient reports passing flatus  No indication for NG Tube given vomiting is minimal and without significant abd distension    Plan:  - IV Zofran as needed    Appreciate Gen Surg Recommendations:  - NPO  - Bowel Regiment  - Maintenance Fluids 100cc/hr NS  - Lyte Repletion as needed  - Daily Miralax  - Minimize opioids    GI Consulted; await Recs 2 day hx of nonbloody nonbilious emesis with only loose diarrhea/stool since OR  XR with dilated Bowel loops concerning for ileus; CT Scan ruled out SBO; showed some possible signs of colitis/ underlying lesion  Patient most likely has postop ileus w/ only partial Obstruction given patient reports passing flatus  No indication for NG Tube given vomiting is minimal and without significant abd distension    Plan:  - IV Zofran as needed    Appreciate Gen Surg Recommendations:  - NPO  - Bowel Regiment  - Maintenance Fluids 100cc/hr NS  - Lyte Repletion as needed  - Daily Miralax  - Minimize opioids 2 day hx of nonbloody nonbilious emesis with only loose diarrhea/stool since OR  XR with dilated Bowel loops concerning for ileus; CT Scan ruled out SBO; showed some possible signs of colitis/ underlying lesion  Patient most likely has postop ileus w/ only partial Obstruction given patient reports passing flatus  No indication for NG Tube given vomiting is minimal and without significant abd distension    Plan:  - IV Zofran as needed    Appreciate Gen Surg Recommendations:  - Clear Liquid Diet  - Bowel Regiment; Miralax, dulcolax suppository prn  - Maintenance Fluids 100cc/hr NS  - Lyte Repletion as needed  - Daily Miralax  - Minimize opioids

## 2023-06-07 NOTE — ED PROVIDER NOTE - PROGRESS NOTE DETAILS
spoke with surgery team - will see pt in ED. request attempted manual disimpaction - Lc Casas PA-C no BRBPR or melena on JONNY, no stool in rectal vault (chaperone SCA Ophelia). - Lc Casas PA-C CT resulted with no SBO/LBO or ileus, colonic thickening cannot r/o colitis or mass. discussed with surgery, recommends medicine admit and GI consult. stable for admission - Lc Casas PA-C

## 2023-06-07 NOTE — H&P ADULT - PROBLEM SELECTOR PLAN 6
Diet: NPO  Psych/Sleep: None MARTHA  GI: Protonix 40 qd, Miralax Daily  DVT ppx: Heparin q8h  Code Status: Full Code  Dispo: Pending Bowel Movement/GI workup Patient had recent mechanical fall; was seen at [facility]  Underwent Right Total Hip Arthroplasty  DVT ppx on 2.5mg Eliquis for 6 weeks    Holding Eliquis given possible procedure  Limiting Opioid regiment until BM Diet: NPO  Psych/Sleep: None MARTHA  GI: Protonix 40 qd, Miralax Daily  DVT ppx: Heparin q8h (possible GI intervention)  Code Status: Full Code  Dispo: Pending Bowel Movement/GI workup Diet: NPO  Psych/Sleep: None MARTHA  GI: Protonix 40 qd, Miralax Daily  DVT ppx: Lovenox 40mg qd  Code Status: Full Code  Dispo: Pending Bowel Movement/GI workup Diet: Clear Liquid Diet  Psych/Sleep: None MARTHA  GI: Protonix 40 qd, Miralax Daily  DVT ppx: Lovenox 40mg qd  Code Status: Full Code  Dispo: Pending Bowel Movement/GI workup

## 2023-06-07 NOTE — H&P ADULT - NSHPPHYSICALEXAM_GEN_ALL_CORE
T(C): 37 (06-07-23 @ 19:37), Max: 37.2 (06-07-23 @ 14:52)  HR: 71 (06-07-23 @ 19:37) (66 - 80)  BP: 108/68 (06-07-23 @ 19:37) (106/56 - 114/74)  RR: 16 (06-07-23 @ 19:37) (16 - 18)  SpO2: 97% (06-07-23 @ 19:37) (95% - 97%)    CONSTITUTIONAL: Well groomed, no apparent distress  EYES: PERRLA and symmetric, EOMI, No conjunctival or scleral injection, non-icteric  ENMT: Oral mucosa with moist membranes. Normal dentition; no pharyngeal injection or exudates             NECK: Supple, symmetric and without tracheal deviation   RESP: No respiratory distress, no use of accessory muscles; CTA b/l, no WRR  CV: RRR, +S1S2, no MRG; no JVD; no peripheral edema  GI: Soft, NT, ND, no rebound, no guarding; no palpable masses; no hepatosplenomegaly; no hernia palpated  LYMPH: No cervical LAD or tenderness; no axillary LAD or tenderness; no inguinal LAD or tenderness  MSK: Normal gait; No digital clubbing or cyanosis; examination of the (head/neck/spine/ribs/pelvis, RUE, LUE, RLE, LLE) without misalignment,            Normal ROM without pain, no spinal tenderness, normal muscle strength/tone  SKIN: No rashes or ulcers noted; no subcutaneous nodules or induration palpable  NEURO: CN II-XII intact; normal reflexes in upper and lower extremities, sensation intact in upper and lower extremities b/l to light touch   PSYCH: Appropriate insight/judgment; A+O x 3, mood and affect appropriate, recent/remote memory intact T(C): 37 (06-07-23 @ 19:37), Max: 37.2 (06-07-23 @ 14:52)  HR: 71 (06-07-23 @ 19:37) (66 - 80)  BP: 108/68 (06-07-23 @ 19:37) (106/56 - 114/74)  RR: 16 (06-07-23 @ 19:37) (16 - 18)  SpO2: 97% (06-07-23 @ 19:37) (95% - 97%)    CONSTITUTIONAL: Well groomed, no apparent distress  EYES: PERRLA and symmetric, EOMI, No conjunctival or scleral injection, non-icteric  ENMT: Oral mucosa with moist membranes. Normal dentition; no pharyngeal injection or exudates  NECK: Supple, symmetric and without tracheal deviation   RESP: No respiratory distress, no use of accessory muscles; CTA b/l, no WRR  CV: RRR, +S1S2, no MRG; no JVD; no peripheral edema  GI: Soft, abdoment distended, Diffusely tender, BS+, R side more tender than left; patient attributes this to hip pain. No rebound  Extremities: No signficant edema appreciated, R leg diffusely tender, Pulses palpable bilaterally  SKIN: No rashes or ulcers noted; no subcutaneous nodules or induration palpable  PSYCH: Appropriate insight/judgment; A+O x 3, mood and affect appropriate, recent/remote memory intact

## 2023-06-07 NOTE — H&P ADULT - PROBLEM SELECTOR PLAN 3
Diet: NPO  Psych/Sleep: None MARTHA  GI: Protonix 40 qd, Miralax Daily  DVT ppx: Heparin q8h  Code Status:  Dispo: Pending Bowel Movement/GI workup 2 day hx of nonbloody nonbilious emesis  XR with dilated Bowel loops    Concern for SBO CT w/ concern for colitis of the descending colon; limited by lack of oral contrast    Pt s/p Unasyn x1 in the ED and reports of intermittent fevers at [facility] to 103    Plan:  Monitor off of antibiotics CT w/ concern for colitis of the descending colon; limited by lack of oral contrast  Pt s/p Unasyn x1 in the ED and reports of intermittent fevers at [facility] to 103  No abx or record from the discharge paperwork    Plan:  Low likelihood for infective colitis; Monitor off of antibiotics  No significant diarrhea, low concern for inflammatory colitis  F/U ESR/CRP CT w/ concern for colitis of the descending colon; limited by lack of oral contrast  Pt s/p Unasyn x1 in the ED and reports of intermittent fevers at [facility] to 103  No abx or record from the discharge paperwork    Plan:  Low likelihood for infective colitis; Monitor off of antibiotics  No significant diarrhea, low concern for inflammatory colitis CT w/ concern for colitis of the descending colon; limited by lack of oral contrast  Pt s/p Unasyn x1 in the ED and reports of intermittent fevers at [facility] to 103  No abx or record from the discharge paperwork    Plan:  Low likelihood for infective colitis; Monitor off of antibiotics  No significant diarrhea, low concern for inflammatory colitis  Curbside GI for evaluation of Intestinal wall thickening

## 2023-06-07 NOTE — PATIENT PROFILE ADULT - ABILITY TO HEAR (WITH HEARING AID OR HEARING APPLIANCE IF NORMALLY USED):
Pt wearing bilateral hearing aids/Mildly to Moderately Impaired: difficulty hearing in some environments or speaker may need to increase volume or speak distinctly

## 2023-06-07 NOTE — ED PROVIDER NOTE - CLINICAL SUMMARY MEDICAL DECISION MAKING FREE TEXT BOX
Dr. Sevilla: 67-year-old female history of osteoarthritis, hysterectomy, status post mechanical fall last week status post ORIF on May 31 at [facility], sent from [facility] with EMS for possible ileus.  Patient states she has been having multiple episodes of nonbloody nonbilious emesis for the last 2 days, abdominal pain, and had 1 loose bowel movement today.  X-ray showed dilated loops of bowel so patient was sent in.  Patient has been complaining of right hip pain and abdominal pain since the surgery.  Patient received 4 mg of Zofran by EMS but continues to feel nauseous.    On exam patient is pale appearing, minimal distress due to pain, regular rate and rhythm, lungs clear to auscultation bilaterally, abdomen soft with diffuse tenderness to palpation, right lateral hip wound clean dry and intact.    X-ray reviewed.  Concern for ileus versus SBO.  Surgery consulted.  As per surgery recommendations will perform JONNY to rule out impaction.  We will get CT abdomen pelvis.  We will also check H&H and extend CT to include hip rule out for surgical bleeding.

## 2023-06-07 NOTE — CONSULT NOTE ADULT - ASSESSMENT
67 female history of osteoarthritis, hysterectomy, s/p right hip fracture for mechanical fall s/p ORIF on 5/31 at [facility] presents with nausea, vomiting, loose BM from [facility]. AXR with distended ascending, transverse, and descending colon. CT scan pending.  Surgery consulted to r/o bowel obstruction.    Plan/recommendations  - CT scan abdomen/pelvis to r/o SBO/LBO  - NPO  - IVF  - Replete electrolytes  - Surgery will follow  - Plan discussed with Surgery Attending Dr. Montalvo    Red Team Surgery  [id number]  67 female history of osteoarthritis, hysterectomy, s/p right hip fracture for mechanical fall s/p ORIF on 5/31 at [facility] presents with nausea, vomiting, loose BM from [facility]. AXR with distended ascending, transverse, and descending colon. CT scan pending. Surgery consulted to r/o bowel obstruction.    Plan/recommendations  - No acute surgical intervention  - Recommend admission to Medicine and GI consult for workup of questionable descending thickening on CT  - NPO  - IVF  - Replete electrolytes  - Bowel regimen  - Minimize opiate use  - Surgery will follow  - Plan discussed with Surgery Attending Dr. Montalvo    Red Team Surgery  [id number]

## 2023-06-07 NOTE — CONSULT NOTE ADULT - SUBJECTIVE AND OBJECTIVE BOX
67 female history of osteoarthritis, hysterectomy, s/p right hip fracture for mechanical fall s/p ORIF on 5/31 at [facility] presents with nausea, vomiting and loose BM.  Patient [facility] for several episodes of nonbloody nonbilious emesis (last episode of emesis 06/07 night). Denies fever, chills, melena, hematochezia, hematemesis, urinary symptoms, rash.    Patient states she was passing flatus earlier in the AM and had a loose bowel movement.    Abdominal xray with distended ascending, transverse, and descending colon. CT scan pending.    Surgery consulted to r/o bowel obstruction.    Vital Signs Last 24 Hrs  T(C): 37.2 (07 Jun 2023 14:52), Max: 37.2 (07 Jun 2023 14:52)  T(F): 98.9 (07 Jun 2023 14:52), Max: 98.9 (07 Jun 2023 14:52)  HR: 66 (07 Jun 2023 14:52) (66 - 80)  BP: 106/56 (07 Jun 2023 14:52) (106/56 - 114/74)  BP(mean): --  RR: 18 (07 Jun 2023 14:52) (17 - 18)  SpO2: 97% (07 Jun 2023 14:52) (95% - 97%)    Parameters below as of 07 Jun 2023 14:52  Patient On (Oxygen Delivery Method): room air    Physical exam  General: NAD  Cardiac: Regular rate  Respiratory: Nonlabored breathing  Abdominal Exam: soft, mildly distended, mild tenderness, no rebound, no guarding  Muskuloskeletal: Moves all 4 extremities spontaneously  Neurological: Alert and oriented     LABS:                           8.2    5.90  )-----------( 336      ( 07 Jun 2023 13:27 )             25.4     06-07    141  |  101  |  20  ----------------------------<  102<H>  3.0<L>   |  26  |  0.58    Ca    8.8      07 Jun 2023 13:27  Mg     2.3     06-07    TPro  5.8<L>  /  Alb  2.9<L>  /  TBili  1.5<H>  /  DBili  x   /  AST  15  /  ALT  13  /  AlkPhos  108  06-07    PT/INR - ( 07 Jun 2023 13:27 )   PT: 17.4 sec;   INR: 1.49 ratio         PTT - ( 07 Jun 2023 13:27 )  PTT:25.8 sec         67 female history of osteoarthritis, hysterectomy, s/p right hip fracture for mechanical fall s/p ORIF on 5/31 at [facility] presents with nausea, vomiting and loose BM.  [facility] episodes of nonbloody nonbilious emesis (last episode of emesis 06/07 night). Denies fever, chills, melena, hematochezia, hematemesis, urinary symptoms, rash.    Patient states she was passing flatus earlier in the AM and had a loose bowel movement.    Abdominal xray with distended ascending, transverse, and descending colon. CT scan pending.    Surgery consulted to r/o bowel obstruction.    Vital Signs Last 24 Hrs  T(C): 37.2 (07 Jun 2023 14:52), Max: 37.2 (07 Jun 2023 14:52)  T(F): 98.9 (07 Jun 2023 14:52), Max: 98.9 (07 Jun 2023 14:52)  HR: 66 (07 Jun 2023 14:52) (66 - 80)  BP: 106/56 (07 Jun 2023 14:52) (106/56 - 114/74)  BP(mean): --  RR: 18 (07 Jun 2023 14:52) (17 - 18)  SpO2: 97% (07 Jun 2023 14:52) (95% - 97%)    Parameters below as of 07 Jun 2023 14:52  Patient On (Oxygen Delivery Method): room air    Physical exam  General: NAD  Cardiac: Regular rate  Respiratory: Nonlabored breathing  Abdominal Exam: soft, mildly distended, mild tenderness, no rebound, no guarding  Muskuloskeletal: Moves all 4 extremities spontaneously  Neurological: Alert and oriented     LABS:                           8.2    5.90  )-----------( 336      ( 07 Jun 2023 13:27 )             25.4     06-07    141  |  101  |  20  ----------------------------<  102<H>  3.0<L>   |  26  |  0.58    Ca    8.8      07 Jun 2023 13:27  Mg     2.3     06-07    TPro  5.8<L>  /  Alb  2.9<L>  /  TBili  1.5<H>  /  DBili  x   /  AST  15  /  ALT  13  /  AlkPhos  108  06-07    PT/INR - ( 07 Jun 2023 13:27 )   PT: 17.4 sec;   INR: 1.49 ratio         PTT - ( 07 Jun 2023 13:27 )  PTT:25.8 sec        ACC: [id number] EXAM:  CT ABDOMEN AND PELVIS IC   ORDERED BY: SILVERIO HAWKINS     PROCEDURE DATE:  06/07/2023          INTERPRETATION:  CLINICAL INFORMATION: Abdominal distention, nausea and   vomiting. Status post right hip replacement. Evaluate for small bowel   obstruction or ileus.    COMPARISON: CT scan of the abdomen and pelvis from 1/22/2015    CONTRAST/COMPLICATIONS:  IV Contrast: Omnipaque 350  90 cc administered   10 cc discarded  Oral Contrast: NONE  Complications: None reported at time of study completion    PROCEDURE:  CT of the Abdomen and Pelvis was performed.  Sagittal and coronal reformats were performed.    FINDINGS:  LOWER CHEST: Within normal limits.    LIVER: Within normal limits.  BILE DUCTS: Normal caliber.  GALLBLADDER: Within normal limits.  SPLEEN: Within normal limits.  PANCREAS: Within normal limits.  ADRENALS: Within normal limits.  KIDNEYS/URETERS: Within normal limits.    BLADDER: Within normal limits.  REPRODUCTIVE ORGANS: Status post hysterectomy. 1.1 cm soft tissue density   at the left vaginal cuff on series 2 image 92 is stable when compared   with 1/22/2015    BOWEL: Lack of oral contrast limits evaluation of the descending colon.   Question of mild wall thickening. Underlying lesion cannot be excluded.   Mild submucosal deposition of fat. Submucosal deposition of fat is seen   in the ascending colon. No bowel obstruction. Appendix not visualized. No   inflammation in the right lower quadrant.  PERITONEUM: No ascites. Presacral edema.  VESSELS: Within normal limits.  RETROPERITONEUM/LYMPH NODES: No lymphadenopathy.  ABDOMINAL WALL: Small umbilical hernia containing fat.  BONES: Status post right hip arthroplasty with postsurgical changes.   Fracture of the right inferior pubic ramus. Degenerative changes of bone.    IMPRESSION:  No small bowel obstruction. Question of wall thickening in the descending   colon which is limited in evaluation. Colitis or underlying lesion cannot   be excluded. Clinical correlation and further evaluation is recommended.    Mild submucosal deposition of fat in the colon may represent chronic   inflammation.    1.1 cm hyperdense structure inseparable from left vaginal cuff is   unchanged when compared with 1/22/2015.        --- End of Report ---            ROBIN GONZALES MD; Attending Radiologist  This document has been electronically signed. Jun 7 2023  5:36PM

## 2023-06-07 NOTE — H&P ADULT - PROBLEM SELECTOR PLAN 4
Patient reported 6-7/10 pain after postop with improvement with protonix down to 3-4/10 during the visit  No concerning peritoneal signs    Plan:  C/W IV Protonix 2 day hx of nonbloody nonbilious emesis  XR with dilated Bowel loops

## 2023-06-07 NOTE — ED PROVIDER NOTE - NS ED ATTENDING STATEMENT MOD
This was a shared visit with the PIPER. I reviewed and verified the documentation and independently performed the documented:

## 2023-06-07 NOTE — ED ADULT NURSE NOTE - OBJECTIVE STATEMENT
Patient presents to Ed via amb with c/o nausea vomiting. Patient with hx or Rt hip total replacement one week ago after a   mechanical fall also recent dx ileus sent [facility] due to nausea and vomiting.

## 2023-06-07 NOTE — H&P ADULT - PROBLEM SELECTOR PLAN 2
Patient had recent mechanical fall; was seen at [facility]  Underwent Right Total Hip Arthroplasty  DVT ppx on 2.5mg Eliquis for 6 weeks Patient had recent mechanical fall; was seen at [facility]  Underwent Right Total Hip Arthroplasty  DVT ppx on 2.5mg Eliquis for 6 weeks    Holding Eliquis given possible procedure  Limiting Opioid regiment until BM CT w/ concern for colitis of the descending colon; limited by lack of oral contrast    Pt s/p Unasyn x1 in the ED and reports of intermittent fevers at [facility] to 103    Plan:  Monitor off of Patient w/ CT significant for pubic rami fracture not visualized on earlier CT Scans    Plan  Likely conservative measures; can consult ortho to evaluate Patient w/ CT significant for pubic rami fracture not visualized on earlier CT Scans    Plan  Conservative measures  Ortho Consult in AM

## 2023-06-07 NOTE — H&P ADULT - HISTORY OF PRESENT ILLNESS
Pt hx of mod-sev MR    ED Course:  67F hx of osteoarthristis, hysterectomy, s/p right hip fracture for mechanical fall s/p ORIF on 5/31 at [facility] presents with 2 day hx of NBNB N/V; Patient has alternate MRN: [id number]    Patient recently had mechanical fall and was seen in [facility] for which they performed a DAREK Patient brought in by ambulance from [facility] for several episodes of nonbloody nonbilious emesis and loose stools.  Patient had x-ray abdomen today reporting possible mild ileus.  Per EMS, rehab team had held morphine due to concern for ileus cause.  Given 4 mg Zofran via EMS with minimal improvement of nausea.  Patient reports upper abdominal discomfort and distention.  Denies fever, chills, melena, hematochezia, hematemesis, urinary symptoms, rash.    ED Course: Temp 96.1 BP: 114/74 HR: 80 RR 17 95% RA  Patient given 1L NS started on 100cc/hr NS  Toradol x1 Ofirmev x1  Unasyn x1  CT A/P w/   Mild wall thickening; poss underlying lesion; Colitis vs lesion  Fracture of the R inferior pubic ramus   67F hx of osteoarthristis, hysterectomy, s/p right hip fracture for mechanical fall s/p ORIF on 5/31 at [facility] presents with 2 day hx of NBNB N/V; Patient has alternate MRN: [id number]    Patient recently had mechanical fall and was seen in [facility] for which they performed a DAREK Patient brought in by ambulance from [facility] rehab for several episodes of nonbloody nonbilious emesis and loose stools.  Patient w/ abd xr showing concern for possible ileus.   Per EMS, rehab team had held morphine due to concern for ileus cause.  Given 4 mg Zofran via EMS with minimal improvement of nausea.      Patient reports upper abdominal discomfort and distention.  Denies fever, chills, melena, hematochezia, hematemesis, urinary symptoms, rash.    ED Course: Temp 96.1 BP: 114/74 HR: 80 RR 17 95% RA  Patient given 1L NS started on 100cc/hr NS  Toradol x1 Ofirmev x1  Unasyn x1  CT A/P w/   Mild wall thickening; poss underlying lesion; Colitis vs lesion  Fracture of the R inferior pubic ramus   67F hx of osteoarthristis, hysterectomy, s/p right hip fracture for mechanical fall s/p ORIF on 5/31 at [facility] presents with 2 day hx of NBNB N/V; Patient has alternate MRN: [id number]    Patient recently had mechanical fall and suffered *** and was seen in [facility] for which they performed a DAREK. Patient was then transferred to [facility] for rehab. She reports she has felt nauseous since her procedure and has not been able to consume any significant food. She endorses passing flatus post-op, and only endorses some possible loose stool yesterday, but denies any normal solid stool or any blood/melena. She also endorsed initial localized epigastric tenderness that was 6-7/10 and gradually improved throughout the week with protonix. In the past 2 days, patient's nausea got worse and she had vomiting episodes when she attempted to eat some cereal.    Patient brought in by ambulance from [facility] rehab for several episodes of nonbloody nonbilious emesis and loose stools.  Patient w/ abd xr showing concern for possible ileus.   Per EMS, rehab team had held morphine due to concern for ileus cause.  Given 4 mg Zofran via EMS with minimal improvement of nausea.      Patient reports upper abdominal discomfort and distention.  Denies fever, chills, melena, hematochezia, hematemesis, urinary symptoms, rash.    ED Course: Temp 96.1 BP: 114/74 HR: 80 RR 17 95% RA  Patient given 1L NS started on 100cc/hr NS  Toradol x1 Ofirmev x1  Unasyn x1  CT A/P w/   Mild wall thickening; poss underlying lesion; Colitis vs lesion  Fracture of the R inferior pubic ramus   67F hx of osteoarthristis, hysterectomy, s/p right hip fracture for mechanical fall s/p ORIF on 5/31 at [facility] presents with 2 day hx of NBNB N/V; Patient has alternate MRN: [id number]    Patient recently had mechanical fall over concrete stairs and suffered R femoral neck fracture and was seen in [facility] for which they performed a DAREK. Patient was then transferred to [facility] for rehab. She reports she has felt nauseous since her procedure and has not been able to consume any significant food. She endorses passing flatus post-op, and only endorses some possible loose stool yesterday while in the shower, but denies any normal solid stool or any blood/melena. She also endorsed initial localized epigastric tenderness that was 6-7/10 and gradually improved throughout the week with protonix. In the past 2 days, patient's nausea got worse and she had vomiting episodes when she attempted to eat some cereal. Patient had abd xr with concern for posible ileus and was taken to [facility] for further evaluation    Patient also endorses recent fever to 103 while at [facility], she is unclear if it was treated with any antibiotics, but reports when her dressing was changed earlier today with the surgeons, they reported surgical site looked excellent without any purulent drainage or erythema.    Otherwise, patient denies any recent sick contacts, any coughing, sneezing, dysuria/hematuria,     ED Course: Temp 96.1 BP: 114/74 HR: 80 RR 17 95% RA  Patient given 1L NS started on 100cc/hr NS  Toradol x1 Ofirmev x1  Unasyn x1  CT A/P w/ Mild wall thickening; poss underlying lesion; Colitis vs lesion  Fracture of the R inferior pubic ramus   67F hx of osteoarthristis, hysterectomy, s/p right hip fracture for mechanical fall s/p ORIF on 5/31 at [facility] presents with 2 day hx of NBNB N/V; Patient has alternate MRN: [id number]    Patient recently had mechanical fall over concrete stairs and suffered R femoral neck fracture and was seen in [facility] for which they performed a DAREK. Patient was then transferred to [facility] for rehab. She reports she has felt nauseous since her procedure and has not been able to consume any significant food. She endorses passing flatus post-op, and only endorses some possible loose stool yesterday while in the shower, but denies any normal solid stool or any blood/melena. She also endorsed initial localized epigastric tenderness that was 6-7/10 and gradually improved throughout the week with protonix. In the past 2 days, patient's nausea got worse and she had vomiting episodes when she attempted to eat some cereal. Patient had abd xr with concern for posible ileus and was taken to [facility] for further evaluation    Patient also endorses recent fever to 103 while at [facility] (not corroborated by documentation from [facility]), she is unclear if it was treated with any antibiotics, but reports when her dressing was changed earlier today with the surgeons, they reported surgical site looked excellent without any purulent drainage or erythema.  Patient also reports last colonoscopy was within 5 years and that it was normal. [employer] shows colonoscopy in 2011 that says "patient reported was normal" EGD in 2015 with normal Esophagus, stomach, duodenum.    Otherwise, patient denies any recent sick contacts, any coughing, sneezing, dysuria/hematuria,     ED Course: Temp 96.1 BP: 114/74 HR: 80 RR 17 95% RA  Patient given 1L NS started on 100cc/hr NS  Toradol x1 Ofirmev x1  Unasyn x1  CT A/P w/ Mild wall thickening; poss underlying lesion; Colitis vs lesion  Fracture of the R inferior pubic ramus

## 2023-06-07 NOTE — ED PROVIDER NOTE - ATTENDING APP SHARED VISIT CONTRIBUTION OF CARE
Dr. Sevilla: I performed a face to face bedside interview with patient regarding history of present illness, review of symptoms and past medical history. I completed an independent physical exam.  I have discussed patient's plan of care with PA.   I agree with note as stated above, having amended the EMR as needed to reflect my findings.   This includes HISTORY OF PRESENT ILLNESS, HIV, PAST MEDICAL/SURGICAL/FAMILY/SOCIAL HISTORY, ALLERGIES AND HOME MEDICATIONS, REVIEW OF SYSTEMS, PHYSICAL EXAM, and any PROGRESS NOTES during the time I functioned as the attending physician for this patient.    see mdm

## 2023-06-08 DIAGNOSIS — S32.599A OTHER SPECIFIED FRACTURE OF UNSPECIFIED PUBIS, INITIAL ENCOUNTER FOR CLOSED FRACTURE: ICD-10-CM

## 2023-06-08 DIAGNOSIS — K91.89 OTHER POSTPROCEDURAL COMPLICATIONS AND DISORDERS OF DIGESTIVE SYSTEM: ICD-10-CM

## 2023-06-08 DIAGNOSIS — K52.9 NONINFECTIVE GASTROENTERITIS AND COLITIS, UNSPECIFIED: ICD-10-CM

## 2023-06-08 DIAGNOSIS — R10.13 EPIGASTRIC PAIN: ICD-10-CM

## 2023-06-08 LAB
ALBUMIN SERPL ELPH-MCNC: 2.8 G/DL — LOW (ref 3.3–5)
ALP SERPL-CCNC: 104 U/L — SIGNIFICANT CHANGE UP (ref 40–120)
ALT FLD-CCNC: 12 U/L — SIGNIFICANT CHANGE UP (ref 10–45)
ANION GAP SERPL CALC-SCNC: 12 MMOL/L — SIGNIFICANT CHANGE UP (ref 5–17)
AST SERPL-CCNC: 16 U/L — SIGNIFICANT CHANGE UP (ref 10–40)
BASOPHILS # BLD AUTO: 0.02 K/UL — SIGNIFICANT CHANGE UP (ref 0–0.2)
BASOPHILS NFR BLD AUTO: 0.4 % — SIGNIFICANT CHANGE UP (ref 0–2)
BILIRUB SERPL-MCNC: 1.8 MG/DL — HIGH (ref 0.2–1.2)
BUN SERPL-MCNC: 18 MG/DL — SIGNIFICANT CHANGE UP (ref 7–23)
CALCIUM SERPL-MCNC: 8.5 MG/DL — SIGNIFICANT CHANGE UP (ref 8.4–10.5)
CHLORIDE SERPL-SCNC: 107 MMOL/L — SIGNIFICANT CHANGE UP (ref 96–108)
CO2 SERPL-SCNC: 24 MMOL/L — SIGNIFICANT CHANGE UP (ref 22–31)
CREAT SERPL-MCNC: 0.53 MG/DL — SIGNIFICANT CHANGE UP (ref 0.5–1.3)
EGFR: 102 ML/MIN/1.73M2 — SIGNIFICANT CHANGE UP
EOSINOPHIL # BLD AUTO: 0.08 K/UL — SIGNIFICANT CHANGE UP (ref 0–0.5)
EOSINOPHIL NFR BLD AUTO: 1.8 % — SIGNIFICANT CHANGE UP (ref 0–6)
GLUCOSE SERPL-MCNC: 86 MG/DL — SIGNIFICANT CHANGE UP (ref 70–99)
HCT VFR BLD CALC: 26.2 % — LOW (ref 34.5–45)
HCV AB S/CO SERPL IA: 0.07 S/CO — SIGNIFICANT CHANGE UP (ref 0–0.99)
HCV AB SERPL-IMP: SIGNIFICANT CHANGE UP
HGB BLD-MCNC: 8 G/DL — LOW (ref 11.5–15.5)
IMM GRANULOCYTES NFR BLD AUTO: 1.3 % — HIGH (ref 0–0.9)
INR BLD: 1.24 RATIO — HIGH (ref 0.88–1.16)
LYMPHOCYTES # BLD AUTO: 0.91 K/UL — LOW (ref 1–3.3)
LYMPHOCYTES # BLD AUTO: 20.2 % — SIGNIFICANT CHANGE UP (ref 13–44)
MAGNESIUM SERPL-MCNC: 2 MG/DL — SIGNIFICANT CHANGE UP (ref 1.6–2.6)
MCHC RBC-ENTMCNC: 29.4 PG — SIGNIFICANT CHANGE UP (ref 27–34)
MCHC RBC-ENTMCNC: 30.5 GM/DL — LOW (ref 32–36)
MCV RBC AUTO: 96.3 FL — SIGNIFICANT CHANGE UP (ref 80–100)
MONOCYTES # BLD AUTO: 0.28 K/UL — SIGNIFICANT CHANGE UP (ref 0–0.9)
MONOCYTES NFR BLD AUTO: 6.2 % — SIGNIFICANT CHANGE UP (ref 2–14)
NEUTROPHILS # BLD AUTO: 3.15 K/UL — SIGNIFICANT CHANGE UP (ref 1.8–7.4)
NEUTROPHILS NFR BLD AUTO: 70.1 % — SIGNIFICANT CHANGE UP (ref 43–77)
NRBC # BLD: 0 /100 WBCS — SIGNIFICANT CHANGE UP (ref 0–0)
PHOSPHATE SERPL-MCNC: 3 MG/DL — SIGNIFICANT CHANGE UP (ref 2.5–4.5)
PLATELET # BLD AUTO: 346 K/UL — SIGNIFICANT CHANGE UP (ref 150–400)
POTASSIUM SERPL-MCNC: 3.4 MMOL/L — LOW (ref 3.5–5.3)
POTASSIUM SERPL-SCNC: 3.4 MMOL/L — LOW (ref 3.5–5.3)
PROT SERPL-MCNC: 5.5 G/DL — LOW (ref 6–8.3)
PROTHROM AB SERPL-ACNC: 14.3 SEC — HIGH (ref 10.5–13.4)
RBC # BLD: 2.72 M/UL — LOW (ref 3.8–5.2)
RBC # FLD: 13.1 % — SIGNIFICANT CHANGE UP (ref 10.3–14.5)
SODIUM SERPL-SCNC: 143 MMOL/L — SIGNIFICANT CHANGE UP (ref 135–145)
WBC # BLD: 4.5 K/UL — SIGNIFICANT CHANGE UP (ref 3.8–10.5)
WBC # FLD AUTO: 4.5 K/UL — SIGNIFICANT CHANGE UP (ref 3.8–10.5)

## 2023-06-08 PROCEDURE — 99221 1ST HOSP IP/OBS SF/LOW 40: CPT | Mod: GC

## 2023-06-08 PROCEDURE — 73502 X-RAY EXAM HIP UNI 2-3 VIEWS: CPT | Mod: 26,RT

## 2023-06-08 PROCEDURE — 73560 X-RAY EXAM OF KNEE 1 OR 2: CPT | Mod: 26,RT

## 2023-06-08 PROCEDURE — 93971 EXTREMITY STUDY: CPT | Mod: 26,RT

## 2023-06-08 PROCEDURE — 99233 SBSQ HOSP IP/OBS HIGH 50: CPT

## 2023-06-08 RX ORDER — PANTOPRAZOLE SODIUM 20 MG/1
40 TABLET, DELAYED RELEASE ORAL
Refills: 0 | Status: DISCONTINUED | OUTPATIENT
Start: 2023-06-08 | End: 2023-06-11

## 2023-06-08 RX ORDER — POTASSIUM CHLORIDE 20 MEQ
20 PACKET (EA) ORAL ONCE
Refills: 0 | Status: COMPLETED | OUTPATIENT
Start: 2023-06-08 | End: 2023-06-08

## 2023-06-08 RX ORDER — ENOXAPARIN SODIUM 100 MG/ML
40 INJECTION SUBCUTANEOUS EVERY 24 HOURS
Refills: 0 | Status: DISCONTINUED | OUTPATIENT
Start: 2023-06-08 | End: 2023-06-09

## 2023-06-08 RX ORDER — LIDOCAINE 4 G/100G
1 CREAM TOPICAL DAILY
Refills: 0 | Status: DISCONTINUED | OUTPATIENT
Start: 2023-06-08 | End: 2023-06-15

## 2023-06-08 RX ORDER — SODIUM CHLORIDE 9 MG/ML
1000 INJECTION INTRAMUSCULAR; INTRAVENOUS; SUBCUTANEOUS
Refills: 0 | Status: DISCONTINUED | OUTPATIENT
Start: 2023-06-08 | End: 2023-06-09

## 2023-06-08 RX ORDER — HEPARIN SODIUM 5000 [USP'U]/ML
5000 INJECTION INTRAVENOUS; SUBCUTANEOUS EVERY 8 HOURS
Refills: 0 | Status: DISCONTINUED | OUTPATIENT
Start: 2023-06-08 | End: 2023-06-08

## 2023-06-08 RX ORDER — POTASSIUM CHLORIDE 20 MEQ
10 PACKET (EA) ORAL
Refills: 0 | Status: COMPLETED | OUTPATIENT
Start: 2023-06-08 | End: 2023-06-08

## 2023-06-08 RX ORDER — ACETAMINOPHEN 500 MG
1000 TABLET ORAL ONCE
Refills: 0 | Status: COMPLETED | OUTPATIENT
Start: 2023-06-08 | End: 2023-06-08

## 2023-06-08 RX ADMIN — SODIUM CHLORIDE 100 MILLILITER(S): 9 INJECTION INTRAMUSCULAR; INTRAVENOUS; SUBCUTANEOUS at 11:23

## 2023-06-08 RX ADMIN — Medication 100 MILLIEQUIVALENT(S): at 13:38

## 2023-06-08 RX ADMIN — LIDOCAINE 1 PATCH: 4 CREAM TOPICAL at 19:30

## 2023-06-08 RX ADMIN — SODIUM CHLORIDE 100 MILLILITER(S): 9 INJECTION INTRAMUSCULAR; INTRAVENOUS; SUBCUTANEOUS at 05:52

## 2023-06-08 RX ADMIN — HEPARIN SODIUM 5000 UNIT(S): 5000 INJECTION INTRAVENOUS; SUBCUTANEOUS at 05:17

## 2023-06-08 RX ADMIN — ENOXAPARIN SODIUM 40 MILLIGRAM(S): 100 INJECTION SUBCUTANEOUS at 08:41

## 2023-06-08 RX ADMIN — PANTOPRAZOLE SODIUM 40 MILLIGRAM(S): 20 TABLET, DELAYED RELEASE ORAL at 05:17

## 2023-06-08 RX ADMIN — LIDOCAINE 1 PATCH: 4 CREAM TOPICAL at 13:39

## 2023-06-08 RX ADMIN — Medication 20 MILLIEQUIVALENT(S): at 13:39

## 2023-06-08 RX ADMIN — Medication 100 MILLIEQUIVALENT(S): at 17:15

## 2023-06-08 RX ADMIN — Medication 100 MILLIEQUIVALENT(S): at 16:05

## 2023-06-08 RX ADMIN — Medication 1000 MILLIGRAM(S): at 09:10

## 2023-06-08 RX ADMIN — LIDOCAINE 1 PATCH: 4 CREAM TOPICAL at 19:40

## 2023-06-08 RX ADMIN — Medication 400 MILLIGRAM(S): at 08:27

## 2023-06-08 NOTE — PROGRESS NOTE ADULT - ASSESSMENT
67 female history of osteoarthritis, hysterectomy, s/p right hip fracture for mechanical fall s/p ORIF on 5/31 at [facility] presents with nausea, vomiting, loose BM from [facility]. AXR with distended ascending, transverse, and descending colon. CT scan without SBO, questionable descending colon thickening. Surgery consulted to r/o bowel obstruction.    Plan/recommendations    - No acute surgical intervention  - f/u GI consult for workup   - CLD  - IVF  - Replete electrolytes  - Bowel regimen  - Avoid narcotics  - Surgery will follow  - Plan discussed with Surgery Attending Dr. Montalvo    Red Team Surgery  [id number]

## 2023-06-08 NOTE — PHYSICAL THERAPY INITIAL EVALUATION ADULT - PLANNED THERAPY INTERVENTIONS, PT EVAL
GOAL: Pt will ascend/descend (12) steps (I) with U HR and step over step pattern in 4 weeks./balance training/bed mobility training/gait training/strengthening/transfer training

## 2023-06-08 NOTE — PHYSICAL THERAPY INITIAL EVALUATION ADULT - GAIT DEVIATIONS NOTED, PT EVAL
decreased travis/increased time in double stance/decreased step length/decreased stride length/decreased weight-shifting ability

## 2023-06-08 NOTE — CONSULT NOTE ADULT - ATTENDING COMMENTS
Hip replacement looks appropriate on imaging.  Pelvis fx healing well.  Care as per medicine.  WBAT.  OOB w PT. DVT ppx
DATE OF SERVICE: 06-07-23 @ 21:29    67F s/p recent fall and ORIF at [facility] on 5/31, discharged to rehab presents from rehab with nausea/vomiting. She reports that at rehab she had no appetite and was not passing gas or having bowel movements. Yesterday she passed gas and felt marginally better but today had additional nausea. Reports having flatus in the ED today but still having abdominal discomfort.    Vitals stable  WBC NL, K 3.0  XR showing dilated loops of colon/bowel  CT showing no SBO, questionable thickening of the descending colon    Abd soft, NT/ND  Rectal without impacted stool    Would recommend medical admission for workup at this itme  Likely element of post op ileus given recent orthopedic surgery, immobility, and naroctic use post op, however there is questionable thickening on the CT scan  recommend GI eval for poss scope  Correct electrolytes  Continue bowel regimen, daily miralax  OOB  Avoid narcotics  Monitor bowel fx  Ok for CLD from surgery perspective  We will follow
Please see updated note above.

## 2023-06-08 NOTE — PHYSICAL THERAPY INITIAL EVALUATION ADULT - ASR WT BEARING STATUS EVAL
no weight-bearing restrictions no weight-bearing restrictions/Right LE Right LE WBAT to B/L LEs as per ortho: Posterior hip precautions to RLE/Left LE/Right LE

## 2023-06-08 NOTE — PROGRESS NOTE ADULT - PROBLEM SELECTOR PLAN 3
- Patient had recent mechanical fall; was seen at [facility]-- Underwent Right Total Hip Arthroplasty  - DVT ppx on 2.5mg Eliquis for 6 weeks  - Holding Eliquis for now in case procedure-- resume eliquis 6/9 if cleared by general surgery, for now c/w lovenox subq  - Limiting Opioid regiment until BM

## 2023-06-08 NOTE — PHYSICAL THERAPY INITIAL EVALUATION ADULT - DID THE PATIENT HAVE SURGERY?
n/a Albendazole Counseling:  I discussed with the patient the risks of albendazole including but not limited to cytopenia, kidney damage, nausea/vomiting and severe allergy.  The patient understands that this medication is being used in an off-label manner.

## 2023-06-08 NOTE — PROGRESS NOTE ADULT - SUBJECTIVE AND OBJECTIVE BOX
continues to feel nauseous mild abdominal pain- not worsening.    GENERAL: No fevers, no chills. + nausea  EYES: No blurry vision,  No photophobia  ENT: No sore throat.  No dysphagia  Cardiovascular: No chest pain, palpitations, orthopnea  Pulmonary: No cough, no wheezing. No shortness of breath  Gastrointestinal: + abdominal pain, no diarrhea, no constipation.    Musculoskeletal: No weakness.  No myalgias.  Dermatology:  No rashes.  Neuro: No Headache.  No vertigo.  No dizziness.  Psych: No anxiety, no depression.  Denies suicidal thoughts.    MEDICATIONS  (STANDING):  enoxaparin Injectable 40 milliGRAM(s) SubCutaneous every 24 hours  pantoprazole  Injectable 40 milliGRAM(s) IV Push <User Schedule>  polyethylene glycol 3350 17 Gram(s) Oral daily  sodium chloride 0.9%. 1000 milliLiter(s) (100 mL/Hr) IV Continuous <Continuous>    MEDICATIONS  (PRN):  acetaminophen     Tablet .. 975 milliGRAM(s) Oral every 6 hours PRN Severe Pain (7 - 10)  acetaminophen     Tablet .. 650 milliGRAM(s) Oral every 6 hours PRN Mild Pain (1 - 3), Moderate Pain (4 - 6)  bisacodyl Suppository 10 milliGRAM(s) Rectal daily PRN Constipation    Vital Signs Last 24 Hrs  T(C): 36.8 (2023 11:41), Max: 37.2 (2023 14:52)  T(F): 98.3 (2023 11:41), Max: 98.9 (2023 14:52)  HR: 70 (2023 11:41) (66 - 80)  BP: 103/65 (2023 11:41) (103/65 - 121/76)  BP(mean): --  RR: 18 (2023 11:41) (16 - 18)  SpO2: 100% (2023 11:41) (95% - 100%)    Parameters below as of 2023 11:41  Patient On (Oxygen Delivery Method): room air    GENERAL: NAD  HEAD:  Atraumatic, Normocephalic  EYES: EOMI, PERRLA, conjunctiva and sclera clear  ENT: Pharynx not erythematous  PULMONARY: Clear to auscultation bilaterally; No wheeze  CARDIOVASCULAR: Regular rate and rhythm; No murmurs, rubs, or gallops  ABDOMEN: Soft, Nontender, Nondistended; Bowel sounds present  EXTREMITIES:  2+ Peripheral Pulses, No clubbing, cyanosis, or edema  MUSCULOSKELETAL: No calf tenderness  PSYCH: AAOx3, normal affect  SKIN: warm and dry, No rashes or lesions    .  LABS:                         8.0    4.50  )-----------( 346      ( 2023 06:57 )             26.2     06-08    143  |  107  |  18  ----------------------------<  86  3.4<L>   |  24  |  0.53    Ca    8.5      2023 06:57  Phos  3.0     06-08  Mg     2.0     06-08    TPro  5.5<L>  /  Alb  2.8<L>  /  TBili  1.8<H>  /  DBili  x   /  AST  16  /  ALT  12  /  AlkPhos  104  06-08    PT/INR - ( 2023 06:59 )   PT: 14.3 sec;   INR: 1.24 ratio         PTT - ( 2023 13:27 )  PTT:25.8 sec  Urinalysis Basic - ( 2023 15:49 )    Color: Yellow / Appearance: Slightly Turbid / S.026 / pH: x  Gluc: x / Ketone: Small  / Bili: Negative / Urobili: 3 mg/dL   Blood: x / Protein: Trace / Nitrite: Negative   Leuk Esterase: Negative / RBC: 2 /hpf / WBC 1 /HPF   Sq Epi: x / Non Sq Epi: x / Bacteria: Negative            RADIOLOGY, EKG & ADDITIONAL TESTS: Reviewed.

## 2023-06-08 NOTE — CONSULT NOTE ADULT - ASSESSMENT
Impression:     #ileus- likely resolving   -Xray with Distended ascending, transverse, and  descending colon. Bowl gas is seen in the rectum. This may be secondary to mild ileus.     #abnormal CT findings   -CT AP with no small bowel obstruction. Question of wall thickening in the descending colon which is limited in evaluation. Colitis or underlying lesion cannot be excluded. Clinical correlation and further evaluation is recommended.  - Last colonoscopy was a few years ago, which was normal (not at [facility]).   - cologuard in [place] recently which was also negative  - Last endoscopy was in 2015 for abd pain, which was normal.     Recommendations:  - patient declines colonoscopy at this time given limited mobility due to hip fracture  - avoid opioids if possible given ileus   - miralax BID, titrate for 1-2 BM a day   - followup outpatient once ileus resolves for colonoscopy to evaluate for thickened descending colon   - Please provide patient with Gastroenterology Clinic outpatient follow up number for an appointment [phone] ([facility]) or [phone] ([facility] at [address] [place] [address])   - rest of care per primary team     No further recommendations from Gastroenterology at this point. Gastroenterology will sign off at this time. Please re-consult for any other further questions.   All recommendations are tentative until note is attested by attending.     Arianna Renner, PGY-4   Gastroenterology/Hepatology Fellow  Available on Microsoft Teams  [phone] ([facility] Short Range Pager)  [phone] ([facility] Long Range Pager)    After 5pm, please contact the on-call GI fellow. [phone] Impression:     # postoperative ileus after hip repair - likely resolving   -Xray with Distended ascending, transverse, and  descending colon. Bowl gas is seen in the rectum. This may be secondary to mild ileus.     #abnormal CT findings   -CT AP with no small bowel obstruction. Question of wall thickening in the descending colon which is limited in evaluation as contrast did not reach this area. Clinical correlation and further evaluation is recommended.  - Last colonoscopy was a few years ago, which was normal (not at [facility]).   - cologuard in [place] recently which was also negative  - Last endoscopy was in 2015 for abd pain, which was normal.     Recommendations:  - patient declines colonoscopy at this time given limited mobility due to hip fracture  - avoid opioids if possible given ileus   - miralax BID, titrate for 1-2 BM a day   - outpatient follow-up with GI  - Please provide patient with Gastroenterology Clinic outpatient follow up number for an appointment [phone] ([facility] in [facility]) or [phone] ([facility] at [address] [place] [address])   - rest of care per primary team     No further recommendations from Gastroenterology at this point. Gastroenterology will sign off at this time. Please re-consult for any other further questions.   All recommendations are tentative until note is attested by attending.     Arianna Renner, PGY-4   Gastroenterology/Hepatology Fellow  Available on Microsoft Teams  [phone] ([facility] Short Range Pager)  [phone] ([facility] Long Range Pager)    After 5pm, please contact the on-call GI fellow. [phone]

## 2023-06-08 NOTE — PROGRESS NOTE ADULT - ATTENDING COMMENTS
DATE OF SERVICE: 06-08-23 @ 12:31    Seen and examined. Feels better today. +Flatus overnight, no abd pain  VSS  Labs stable, K improving  Abd soft, NT/ND today, distension greatly improved    GI recs appreciated  Can start diet slowly as tolerated  Bowel regimen  Avoid narcotics

## 2023-06-08 NOTE — PHYSICAL THERAPY INITIAL EVALUATION ADULT - LEVEL OF INDEPENDENCE: SUPINE/SIT, REHAB EVAL
Faxed additional information to [employer] for patients PA to try and get approval for his MRI of prostate W WO contrast. Everything was faxed to the additional information dept  Ph [phone]  Fx: [phone]   minimum assist (75% patients effort)

## 2023-06-08 NOTE — PHYSICAL THERAPY INITIAL EVALUATION ADULT - ACTIVE RANGE OF MOTION EXAMINATION, REHAB EVAL
R hip flexion limited 2/2 pain/Left UE Active ROM was WFL (within functional limits)/Right UE Active ROM was WFL (within functional limits)/Left LE Active ROM was WFL (within functional limits)/Right LE Active ROM was WFL (within functional limits)

## 2023-06-08 NOTE — PHYSICAL THERAPY INITIAL EVALUATION ADULT - PERTINENT HX OF CURRENT PROBLEM, REHAB EVAL
67F hx of osteoarthritis, hysterectomy, recent pelvic fx s/p R DAREK on 5/31 is here today for vomiting with CT A/P concerning for likely postop ileus with CT A/P Showing evidence of possible colitis vs lesion. postop ileus is most likely given recent DARKE, lack of food intake, as well as minimal ambulation with plan for nonoperative management with IV fluids and bowel rest. 6/7 CT abd: No small bowel obstruction. Question of wall thickening in the descending colon which is limited in evaluation. Colitis or underlying lesion cannot be excluded. Clinical correlation and further evaluation is recommended. Mild submucosal deposition of fat in the colon may represent chronic inflammation. 67F hx of osteoarthritis, hysterectomy, recent pelvic fx s/p R DAREK on 5/31 is here today for vomiting with CT A/P concerning for likely postop ileus with CT A/P Showing evidence of possible colitis vs lesion. postop ileus is most likely given recent DAREK, lack of food intake, as well as minimal ambulation with plan for nonoperative management with IV fluids and bowel rest. 6/7 CT abd: No small bowel obstruction. Question of wall thickening in the descending colon which is limited in evaluation. Colitis or underlying lesion cannot be excluded. Clinical correlation and further evaluation is recommended. Mild submucosal deposition of fat in the colon may represent chronic inflammation. Xray R hip 6/8: Xray R hip 6/8: Cementless right total hip prosthesis with screw fixated acetabular cup  present. Intact aligned hardware components and no periprosthetic fractures or suspicious periprosthetic metal bone interface gaps to   suggest loosening. Surgical skin staples overlie the incision site. Redemonstrated focal cortical discontinuity in mid right superior pubic ramus region, better demonstrated on the CT study. Intact remaining imaged osseous articular structures. No sacroiliac or pubic symphysis diastases or cephalocaudal or anteroposterior malalignment. Generalized osteopenia.

## 2023-06-08 NOTE — PROGRESS NOTE ADULT - ASSESSMENT
67F hx of osteoarthritis, hysterectomy, recent pelvic fx s/p R DAREK is here today for vomiting with CT A/P concerning for likely postop ileus with CT A/P Showing evidence of possible colitis vs lesion. postop ileus is most likely given recent DARKE, lack of food intake, as well as minimal ambulation with plan for nonoperative management with IV fluids and bowel rest.

## 2023-06-08 NOTE — CONSULT NOTE ADULT - SUBJECTIVE AND OBJECTIVE BOX
HPI: 67F hx of osteoarthristis, hysterectomy, s/p right hip fracture for mechanical fall s/p ORIF on  at [facility] presents with 2 day hx of NBNB N/V. Patient recently had mechanical fall over concrete stairs and suffered R femoral neck fracture and was seen in [facility]. Patient was then transferred to [facility] for rehab. She reports she has felt nauseous since her procedure and has not been able to consume any significant food. She endorses passing flatus post-op, and only endorses some possible loose stool yesterday while in the shower on Monday. Patient reports that she currently does not have nausea and has an appetite. No vomiting since 2 days prior.     Last colonoscopy was a few years ago, which was normal (not at [facility]). Patient has had a cologuard in [place] recently which was also negative. Last endoscopy was in  for abd pain, which was normal.     Xray with Distended ascending, transverse, and  descending colon. Bowl gas is seen in the rectum. This may be secondary to mild ileus. CT AP with No small bowel obstruction. Question of wall thickening in the descending colon which is limited in evaluation. Colitis or underlying lesion cannot   be excluded. Clinical correlation and further evaluation is recommended.      Allergies:  Metals (Rash)  No Known Drug Allergies      Home Medications:    Hospital Medications:  acetaminophen     Tablet .. 650 milliGRAM(s) Oral every 6 hours PRN  acetaminophen     Tablet .. 975 milliGRAM(s) Oral every 6 hours PRN  bisacodyl Suppository 10 milliGRAM(s) Rectal daily PRN  enoxaparin Injectable 40 milliGRAM(s) SubCutaneous every 24 hours  pantoprazole  Injectable 40 milliGRAM(s) IV Push <User Schedule>  polyethylene glycol 3350 17 Gram(s) Oral daily  sodium chloride 0.9%. 1000 milliLiter(s) IV Continuous <Continuous>      PMHX/PSHX:  No pertinent past medical history    Primary osteoarthritis, left hand    2019 novel coronavirus disease (COVID-19)    History of partial hysterectomy    History of D&C    History of nasal septoplasty    History of colonoscopy        Family history:      Denies family history of colon cancer/polyps, stomach cancer/polyps, pancreatic cancer/masses, liver cancer/disease, ovarian cancer and endometrial cancer.    Social History:   Tob: Denies  EtOH: Denies  Illicit Drugs: Denies    ROS:     General:  No wt loss, fevers, chills, night sweats, fatigue  Eyes:  Good vision, no reported pain  ENT:  No sore throat, pain, runny nose, dysphagia  CV:  No pain, palpitations, hypo/hypertension  Pulm:  No dyspnea, cough, tachypnea, wheezing  GI:  see HPI  :  No pain, bleeding, incontinence, nocturia  Muscle:  No pain, weakness  Neuro:  No weakness, tingling, memory problems  Psych:  No fatigue, insomnia, mood problems, depression  Endocrine:  No polyuria, polydipsia, cold/heat intolerance  Heme:  No petechiae, ecchymosis, easy bruisability  Skin:  No rash, tattoos, scars, edema    PHYSICAL EXAM:     GENERAL:  No acute distress, patient appears comfortable in bed   HEENT:  NCAT, no scleral icterus   CHEST:  no respiratory distress  HEART:  Regular rate and rhythm  ABDOMEN:  Soft, non-tender, non-distended, no masses  EXTREMITIES: No edema, patient with right hip fracture, reports pain in area   SKIN:  No rash/erythema/ecchymoses/petechiae/wounds/abscess/warm/dry  NEURO:  Alert and oriented x 3    Vital Signs:  Vital Signs Last 24 Hrs  T(C): 37 (2023 04:41), Max: 37.2 (2023 14:52)  T(F): 98.6 (2023 04:41), Max: 98.9 (2023 14:52)  HR: 75 (2023 04:41) (66 - 80)  BP: 111/63 (2023 04:41) (106/56 - 121/76)  BP(mean): --  RR: 18 (2023 04:41) (16 - 18)  SpO2: 97% (2023 04:41) (95% - 97%)    Parameters below as of 2023 04:41  Patient On (Oxygen Delivery Method): room air      Daily Height in cm: 149.86 (2023 12:08)    Daily     LABS:                        8.0    4.50  )-----------( 346      ( 2023 06:57 )             26.2     Mean Cell Volume: 96.3 fl (-23 @ 06:57)        143  |  107  |  18  ----------------------------<  86  3.4<L>   |  24  |  0.53    Ca    8.5      2023 06:57  Phos  3.0     06-08  Mg     2.0     06-08    TPro  5.5<L>  /  Alb  2.8<L>  /  TBili  1.8<H>  /  DBili  x   /  AST  16  /  ALT  12  /  AlkPhos  104  06-08    LIVER FUNCTIONS - ( 2023 06:57 )  Alb: 2.8 g/dL / Pro: 5.5 g/dL / ALK PHOS: 104 U/L / ALT: 12 U/L / AST: 16 U/L / GGT: x           PT/INR - ( 2023 06:59 )   PT: 14.3 sec;   INR: 1.24 ratio         PTT - ( 2023 13:27 )  PTT:25.8 sec  Urinalysis Basic - ( 2023 15:49 )    Color: Yellow / Appearance: Slightly Turbid / S.026 / pH: x  Gluc: x / Ketone: Small  / Bili: Negative / Urobili: 3 mg/dL   Blood: x / Protein: Trace / Nitrite: Negative   Leuk Esterase: Negative / RBC: 2 /hpf / WBC 1 /HPF   Sq Epi: x / Non Sq Epi: x / Bacteria: Negative      Amylase Serum--      Lipase serum27       Ammonia--                          8.0    4.50  )-----------( 346      ( 2023 06:57 )             26.2                         8.2    5.90  )-----------( 336      ( 2023 13:27 )             25.4       Imaging:

## 2023-06-08 NOTE — PHYSICAL THERAPY INITIAL EVALUATION ADULT - ADDITIONAL COMMENTS
Pt adm from rehab. Pt lives alone in a pvt house w/ 3 steps to neg to enter. No DME at home. Pt was independent w/ ADL's. Pt reports minimal ambulation at rehab.

## 2023-06-08 NOTE — PROGRESS NOTE ADULT - SUBJECTIVE AND OBJECTIVE BOX
67 female history of osteoarthritis, hysterectomy, s/p right hip fracture for mechanical fall s/p ORIF on 5/31 at [facility] presents with nausea, vomiting and loose BM.  [facility] several episodes of nonbloody nonbilious emesis (last episode of emesis 06/07 night). Denies fever, chills, melena, hematochezia, hematemesis, urinary symptoms, rash.    Patient states she was passing flatus earlier in the AM and had a loose bowel movement.    Abdominal xray with distended ascending, transverse, and descending colon. CT scan without SBO, questionable descending colon thickening.  Surgery consulted to r/o bowel obstruction.     Vital Signs Last 24 Hrs  T(C): 37 (08 Jun 2023 04:41), Max: 37.2 (07 Jun 2023 14:52)  T(F): 98.6 (08 Jun 2023 04:41), Max: 98.9 (07 Jun 2023 14:52)  HR: 75 (08 Jun 2023 04:41) (66 - 80)  BP: 111/63 (08 Jun 2023 04:41) (106/56 - 121/76)  BP(mean): --  RR: 18 (08 Jun 2023 04:41) (16 - 18)  SpO2: 97% (08 Jun 2023 04:41) (95% - 97%)    Parameters below as of 08 Jun 2023 04:41  Patient On (Oxygen Delivery Method): room air      Physical exam  General: NAD  Cardiac: Regular rate  Respiratory: Nonlabored breathing  Abdominal Exam: soft, mildly distended, mild tenderness, no rebound, no guarding  Muskuloskeletal: Moves all 4 extremities spontaneously  Neurological: Alert and oriented        LABS:                           8.0    4.50  )-----------( 346      ( 08 Jun 2023 06:57 )             26.2     06-08    143  |  107  |  18  ----------------------------<  86  3.4<L>   |  24  |  0.53    Ca    8.5      08 Jun 2023 06:57  Phos  3.0     06-08  Mg     2.0     06-08    TPro  5.5<L>  /  Alb  2.8<L>  /  TBili  1.8<H>  /  DBili  x   /  AST  16  /  ALT  12  /  AlkPhos  104  06-08    PT/INR - ( 08 Jun 2023 06:59 )   PT: 14.3 sec;   INR: 1.24 ratio         PTT - ( 07 Jun 2023 13:27 )  PTT:25.8 sec              ACC: [id number] EXAM:  CT ABDOMEN AND PELVIS IC   ORDERED BY: SILVERIO HAWKINS     PROCEDURE DATE:  06/07/2023          INTERPRETATION:  CLINICAL INFORMATION: Abdominal distention, nausea and   vomiting. Status post right hip replacement. Evaluate for small bowel   obstruction or ileus.    COMPARISON: CT scan of the abdomen and pelvis from 1/22/2015    CONTRAST/COMPLICATIONS:  IV Contrast: Omnipaque 350  90 cc administered   10 cc discarded  Oral Contrast: NONE  Complications: None reported at time of study completion    PROCEDURE:  CT of the Abdomen and Pelvis was performed.  Sagittal and coronal reformats were performed.    FINDINGS:  LOWER CHEST: Within normal limits.    LIVER: Within normal limits.  BILE DUCTS: Normal caliber.  GALLBLADDER: Within normal limits.  SPLEEN: Within normal limits.  PANCREAS: Within normal limits.  ADRENALS: Within normal limits.  KIDNEYS/URETERS: Within normal limits.    BLADDER: Within normal limits.  REPRODUCTIVE ORGANS: Status post hysterectomy. 1.1 cm soft tissue density   at the left vaginal cuff on series 2 image 92 is stable when compared   with 1/22/2015    BOWEL: Lack of oral contrast limits evaluation of the descending colon.   Question of mild wall thickening. Underlying lesion cannot be excluded.   Mild submucosal deposition of fat. Submucosal deposition of fat is seen   in the ascending colon. No bowel obstruction. Appendix not visualized. No   inflammation in the right lower quadrant.  PERITONEUM: No ascites. Presacral edema.  VESSELS: Within normal limits.  RETROPERITONEUM/LYMPH NODES: No lymphadenopathy.  ABDOMINAL WALL: Small umbilical hernia containing fat.  BONES: Status post right hip arthroplasty with postsurgical changes.   Fracture of the right inferior pubic ramus. Degenerative changes of bone.    IMPRESSION:  No small bowel obstruction. Question of wall thickening in the descending   colon which is limited in evaluation. Colitis or underlying lesion cannot   be excluded. Clinical correlation and further evaluation is recommended.    Mild submucosal deposition of fat in the colon may represent chronic   inflammation.    1.1 cm hyperdense structure inseparable from left vaginal cuff is   unchanged when compared with 1/22/2015.        --- End of Report ---            ROBIN GONZALES MD; Attending Radiologist  This document has been electronically signed. Jun 7 2023  5:36PM   67 female history of osteoarthritis, hysterectomy, s/p right hip fracture for mechanical fall s/p ORIF on 5/31 at [facility] presents with nausea, vomiting and loose BM.  [facility] episodes of nonbloody nonbilious emesis (last episode of emesis 06/07 night). Denies fever, chills, melena, hematochezia, hematemesis, urinary symptoms, rash.    Patient states she was passing flatus earlier in the AM and had a loose bowel movement.    Abdominal xray with distended ascending, transverse, and descending colon. CT scan without SBO, questionable descending colon thickening.  Surgery consulted to r/o bowel obstruction.    Overnight events:  Passing flatus, denies nausea or vomiting.     Vital Signs Last 24 Hrs  T(C): 37 (08 Jun 2023 04:41), Max: 37.2 (07 Jun 2023 14:52)  T(F): 98.6 (08 Jun 2023 04:41), Max: 98.9 (07 Jun 2023 14:52)  HR: 75 (08 Jun 2023 04:41) (66 - 80)  BP: 111/63 (08 Jun 2023 04:41) (106/56 - 121/76)  BP(mean): --  RR: 18 (08 Jun 2023 04:41) (16 - 18)  SpO2: 97% (08 Jun 2023 04:41) (95% - 97%)    Parameters below as of 08 Jun 2023 04:41  Patient On (Oxygen Delivery Method): room air      Physical exam  General: NAD  Cardiac: Regular rate  Respiratory: Nonlabored breathing  Abdominal Exam: soft, mildly distended, mild tenderness, no rebound, no guarding  Muskuloskeletal: Moves all 4 extremities spontaneously  Neurological: Alert and oriented        LABS:                           8.0    4.50  )-----------( 346      ( 08 Jun 2023 06:57 )             26.2     06-08    143  |  107  |  18  ----------------------------<  86  3.4<L>   |  24  |  0.53    Ca    8.5      08 Jun 2023 06:57  Phos  3.0     06-08  Mg     2.0     06-08    TPro  5.5<L>  /  Alb  2.8<L>  /  TBili  1.8<H>  /  DBili  x   /  AST  16  /  ALT  12  /  AlkPhos  104  06-08    PT/INR - ( 08 Jun 2023 06:59 )   PT: 14.3 sec;   INR: 1.24 ratio         PTT - ( 07 Jun 2023 13:27 )  PTT:25.8 sec              ACC: [id number] EXAM:  CT ABDOMEN AND PELVIS IC   ORDERED BY: SILVERIO HAWKINS     PROCEDURE DATE:  06/07/2023          INTERPRETATION:  CLINICAL INFORMATION: Abdominal distention, nausea and   vomiting. Status post right hip replacement. Evaluate for small bowel   obstruction or ileus.    COMPARISON: CT scan of the abdomen and pelvis from 1/22/2015    CONTRAST/COMPLICATIONS:  IV Contrast: Omnipaque 350  90 cc administered   10 cc discarded  Oral Contrast: NONE  Complications: None reported at time of study completion    PROCEDURE:  CT of the Abdomen and Pelvis was performed.  Sagittal and coronal reformats were performed.    FINDINGS:  LOWER CHEST: Within normal limits.    LIVER: Within normal limits.  BILE DUCTS: Normal caliber.  GALLBLADDER: Within normal limits.  SPLEEN: Within normal limits.  PANCREAS: Within normal limits.  ADRENALS: Within normal limits.  KIDNEYS/URETERS: Within normal limits.    BLADDER: Within normal limits.  REPRODUCTIVE ORGANS: Status post hysterectomy. 1.1 cm soft tissue density   at the left vaginal cuff on series 2 image 92 is stable when compared   with 1/22/2015    BOWEL: Lack of oral contrast limits evaluation of the descending colon.   Question of mild wall thickening. Underlying lesion cannot be excluded.   Mild submucosal deposition of fat. Submucosal deposition of fat is seen   in the ascending colon. No bowel obstruction. Appendix not visualized. No   inflammation in the right lower quadrant.  PERITONEUM: No ascites. Presacral edema.  VESSELS: Within normal limits.  RETROPERITONEUM/LYMPH NODES: No lymphadenopathy.  ABDOMINAL WALL: Small umbilical hernia containing fat.  BONES: Status post right hip arthroplasty with postsurgical changes.   Fracture of the right inferior pubic ramus. Degenerative changes of bone.    IMPRESSION:  No small bowel obstruction. Question of wall thickening in the descending   colon which is limited in evaluation. Colitis or underlying lesion cannot   be excluded. Clinical correlation and further evaluation is recommended.    Mild submucosal deposition of fat in the colon may represent chronic   inflammation.    1.1 cm hyperdense structure inseparable from left vaginal cuff is   unchanged when compared with 1/22/2015.        --- End of Report ---            ROBIN GONZALES MD; Attending Radiologist  This document has been electronically signed. Jun 7 2023  5:36PM

## 2023-06-08 NOTE — PHYSICAL THERAPY INITIAL EVALUATION ADULT - MANUAL MUSCLE TESTING RESULTS, REHAB EVAL
B/L UEs ~4/5 LLE ~3/5 RLE ~3/5 except knee 3-/5 hip flexion not tested 2/2 pain/grossly assessed due to

## 2023-06-08 NOTE — PROGRESS NOTE ADULT - PROBLEM SELECTOR PLAN 1
- XR with dilated Bowel loops concerning for ileus; CT Scan ruled out SBO; showed some possible signs of colitis/ underlying lesion  - IV Zofran as needed  - Clear Liquid Diet-- advance as tolerated  - c/w Miralax, dulcolax suppository prn  - c/w 100cc/hr NS  - Minimize opioids

## 2023-06-08 NOTE — PROGRESS NOTE ADULT - NSPROGADDITIONALINFOA_GEN_ALL_CORE
disposition: pending diet advancement, ortho pending, PT pending    Juli Velasco D.O.  Division of Hospital Medicine  Available on MS Teams

## 2023-06-08 NOTE — CONSULT NOTE ADULT - SUBJECTIVE AND OBJECTIVE BOX
67y Female presents s/p R DAREK w Dr Oakley at [facility] on 5/31/23 presents with abdominal pain. States hips feels good but has not been OOB much and was DC to rehab at [facility]. Denies new fall/ HS/LOC. Denies numbness/tingling. Denies pain/injury elsewhere.    HEALTH ISSUES - PROBLEM Dx:  Need for prophylactic measure    Nausea and vomiting    History of repair of hip fracture    Postoperative ileus    Epigastric pain    Colitis    Pubic ramus fracture          MEDICATIONS  (STANDING):  enoxaparin Injectable 40 milliGRAM(s) SubCutaneous every 24 hours  lidocaine   4% Patch 1 Patch Transdermal daily  lidocaine   4% Patch 1 Patch Transdermal daily  pantoprazole  Injectable 40 milliGRAM(s) IV Push <User Schedule>  polyethylene glycol 3350 17 Gram(s) Oral daily  potassium chloride   Powder 20 milliEquivalent(s) Oral once  potassium chloride  10 mEq/100 mL IVPB 10 milliEquivalent(s) IV Intermittent every 1 hour  sodium chloride 0.9%. 1000 milliLiter(s) IV Continuous <Continuous>    Allergies    Metals (Rash)  No Known Drug Allergies    Intolerances                              8.0    4.50  )-----------( 346      ( 08 Jun 2023 06:57 )             26.2     08 Jun 2023 06:57    143    |  107    |  18     ----------------------------<  86     3.4     |  24     |  0.53     Ca    8.5        08 Jun 2023 06:57  Phos  3.0       08 Jun 2023 06:57  Mg     2.0       08 Jun 2023 06:57    TPro  5.5    /  Alb  2.8    /  TBili  1.8    /  DBili  x      /  AST  16     /  ALT  12     /  AlkPhos  104    08 Jun 2023 06:57    PT/INR - ( 08 Jun 2023 06:59 )   PT: 14.3 sec;   INR: 1.24 ratio         PTT - ( 07 Jun 2023 13:27 )  PTT:25.8 sec      Vital Signs Last 24 Hrs  T(C): 36.8 (06-08-23 @ 11:41), Max: 37.2 (06-07-23 @ 14:52)  T(F): 98.3 (06-08-23 @ 11:41), Max: 98.9 (06-07-23 @ 14:52)  HR: 70 (06-08-23 @ 11:41) (66 - 77)  BP: 103/65 (06-08-23 @ 11:41) (103/65 - 121/76)  BP(mean): --  RR: 18 (06-08-23 @ 11:41) (16 - 18)  SpO2: 100% (06-08-23 @ 11:41) (97% - 100%)    Gen: NAD  RLE:   R posterior hip incision intact, closed with staples  +TTP right groin   PROM with mild pain  Negative log roll/heel strike  +TA/EHL/FS  L2-S1 SILT  DP/PT 2+  mild right calf and knee tenderness   Compartments soft and compressible    RUE: No bony tenderness or deformity, skin intact  LLUE: No bony tenderness or deformity, skin intact    CT shows intact DAREK prosthesis with R inf pubic ramus fx    A/P: 67y Female w R DAREK  for FN fx on 5/31/23 and R Inf Pubic Ramus Fracture  Pain control  DVT ppx lovenox  WBAT BLLE, posterior hip precautions right hip   FU imaging, XR hip and pelvis, knee, Doppler RLE ordered   No acute ortho surgical intervention  primary care team appreciated   Follow up with Dr. Oakley as outpatient in 7-10 days, call office for appointment  Ortho stable 67y Female presents s/p R DAREK w Dr Oakley at [facility] on 5/31/23 presents with abdominal pain. States hips feels good but has not been OOB much and was DC to rehab at [facility]. Denies new fall/ HS/LOC. Denies numbness/tingling. Denies pain/injury elsewhere.    HEALTH ISSUES - PROBLEM Dx:  Need for prophylactic measure    Nausea and vomiting    History of repair of hip fracture    Postoperative ileus    Epigastric pain    Colitis    Pubic ramus fracture          MEDICATIONS  (STANDING):  enoxaparin Injectable 40 milliGRAM(s) SubCutaneous every 24 hours  lidocaine   4% Patch 1 Patch Transdermal daily  lidocaine   4% Patch 1 Patch Transdermal daily  pantoprazole  Injectable 40 milliGRAM(s) IV Push <User Schedule>  polyethylene glycol 3350 17 Gram(s) Oral daily  potassium chloride   Powder 20 milliEquivalent(s) Oral once  potassium chloride  10 mEq/100 mL IVPB 10 milliEquivalent(s) IV Intermittent every 1 hour  sodium chloride 0.9%. 1000 milliLiter(s) IV Continuous <Continuous>    Allergies    Metals (Rash)  No Known Drug Allergies    Intolerances                              8.0    4.50  )-----------( 346      ( 08 Jun 2023 06:57 )             26.2     08 Jun 2023 06:57    143    |  107    |  18     ----------------------------<  86     3.4     |  24     |  0.53     Ca    8.5        08 Jun 2023 06:57  Phos  3.0       08 Jun 2023 06:57  Mg     2.0       08 Jun 2023 06:57    TPro  5.5    /  Alb  2.8    /  TBili  1.8    /  DBili  x      /  AST  16     /  ALT  12     /  AlkPhos  104    08 Jun 2023 06:57    PT/INR - ( 08 Jun 2023 06:59 )   PT: 14.3 sec;   INR: 1.24 ratio         PTT - ( 07 Jun 2023 13:27 )  PTT:25.8 sec      Vital Signs Last 24 Hrs  T(C): 36.8 (06-08-23 @ 11:41), Max: 37.2 (06-07-23 @ 14:52)  T(F): 98.3 (06-08-23 @ 11:41), Max: 98.9 (06-07-23 @ 14:52)  HR: 70 (06-08-23 @ 11:41) (66 - 77)  BP: 103/65 (06-08-23 @ 11:41) (103/65 - 121/76)  BP(mean): --  RR: 18 (06-08-23 @ 11:41) (16 - 18)  SpO2: 100% (06-08-23 @ 11:41) (97% - 100%)    Gen: NAD  RLE:   R posterior hip incision intact, closed with staples  +TTP right groin   PROM with mild pain  Negative log roll/heel strike  +TA/EHL/FS  L2-S1 SILT  DP/PT 2+  mild right calf and knee tenderness   Compartments soft and compressible    RUE: No bony tenderness or deformity, skin intact  LLUE: No bony tenderness or deformity, skin intact    CT shows intact DAREK prosthesis with R inf pubic ramus fx    A/P: 67y Female w R DAREK  for FN fx on 5/31/23 and R Inf Pubic Ramus Fracture  Pain control  DVT ppx lovenox  WBAT BLLE, anterior hip precautions right hip   FU imaging, XR hip and pelvis, knee, Doppler RLE ordered   No acute ortho surgical intervention  primary care team appreciated   Follow up with Dr. Oakley as outpatient in 7-10 days, call office for appointment  Ortho stable

## 2023-06-09 ENCOUNTER — TRANSCRIPTION ENCOUNTER (OUTPATIENT)
Age: 68
End: 2023-06-09

## 2023-06-09 LAB
ALBUMIN SERPL ELPH-MCNC: 2.8 G/DL — LOW (ref 3.3–5)
ALP SERPL-CCNC: 114 U/L — SIGNIFICANT CHANGE UP (ref 40–120)
ALT FLD-CCNC: 14 U/L — SIGNIFICANT CHANGE UP (ref 10–45)
ANION GAP SERPL CALC-SCNC: 13 MMOL/L — SIGNIFICANT CHANGE UP (ref 5–17)
AST SERPL-CCNC: 17 U/L — SIGNIFICANT CHANGE UP (ref 10–40)
BILIRUB DIRECT SERPL-MCNC: 1.2 MG/DL — HIGH (ref 0–0.3)
BILIRUB INDIRECT FLD-MCNC: 0.8 MG/DL — SIGNIFICANT CHANGE UP (ref 0.2–1)
BILIRUB SERPL-MCNC: 2 MG/DL — HIGH (ref 0.2–1.2)
BILIRUB SERPL-MCNC: 2 MG/DL — HIGH (ref 0.2–1.2)
BLD GP AB SCN SERPL QL: NEGATIVE — SIGNIFICANT CHANGE UP
BUN SERPL-MCNC: 12 MG/DL — SIGNIFICANT CHANGE UP (ref 7–23)
CALCIUM SERPL-MCNC: 8.1 MG/DL — LOW (ref 8.4–10.5)
CHLORIDE SERPL-SCNC: 106 MMOL/L — SIGNIFICANT CHANGE UP (ref 96–108)
CO2 SERPL-SCNC: 22 MMOL/L — SIGNIFICANT CHANGE UP (ref 22–31)
CREAT SERPL-MCNC: 0.51 MG/DL — SIGNIFICANT CHANGE UP (ref 0.5–1.3)
CULTURE RESULTS: SIGNIFICANT CHANGE UP
EGFR: 103 ML/MIN/1.73M2 — SIGNIFICANT CHANGE UP
GLUCOSE SERPL-MCNC: 82 MG/DL — SIGNIFICANT CHANGE UP (ref 70–99)
HCT VFR BLD CALC: 24.4 % — LOW (ref 34.5–45)
HGB BLD-MCNC: 7.6 G/DL — LOW (ref 11.5–15.5)
MCHC RBC-ENTMCNC: 29.5 PG — SIGNIFICANT CHANGE UP (ref 27–34)
MCHC RBC-ENTMCNC: 31.1 GM/DL — LOW (ref 32–36)
MCV RBC AUTO: 94.6 FL — SIGNIFICANT CHANGE UP (ref 80–100)
NRBC # BLD: 0 /100 WBCS — SIGNIFICANT CHANGE UP (ref 0–0)
PLATELET # BLD AUTO: 362 K/UL — SIGNIFICANT CHANGE UP (ref 150–400)
POTASSIUM SERPL-MCNC: 4.1 MMOL/L — SIGNIFICANT CHANGE UP (ref 3.5–5.3)
POTASSIUM SERPL-SCNC: 4.1 MMOL/L — SIGNIFICANT CHANGE UP (ref 3.5–5.3)
PROT SERPL-MCNC: 5 G/DL — LOW (ref 6–8.3)
RBC # BLD: 2.58 M/UL — LOW (ref 3.8–5.2)
RBC # FLD: 13.1 % — SIGNIFICANT CHANGE UP (ref 10.3–14.5)
RH IG SCN BLD-IMP: POSITIVE — SIGNIFICANT CHANGE UP
SARS-COV-2 RNA SPEC QL NAA+PROBE: SIGNIFICANT CHANGE UP
SODIUM SERPL-SCNC: 141 MMOL/L — SIGNIFICANT CHANGE UP (ref 135–145)
SPECIMEN SOURCE: SIGNIFICANT CHANGE UP
WBC # BLD: 5.1 K/UL — SIGNIFICANT CHANGE UP (ref 3.8–10.5)
WBC # FLD AUTO: 5.1 K/UL — SIGNIFICANT CHANGE UP (ref 3.8–10.5)

## 2023-06-09 PROCEDURE — 74018 RADEX ABDOMEN 1 VIEW: CPT | Mod: 26

## 2023-06-09 PROCEDURE — 99233 SBSQ HOSP IP/OBS HIGH 50: CPT

## 2023-06-09 PROCEDURE — 99221 1ST HOSP IP/OBS SF/LOW 40: CPT

## 2023-06-09 RX ORDER — SENNA PLUS 8.6 MG/1
2 TABLET ORAL AT BEDTIME
Refills: 0 | Status: DISCONTINUED | OUTPATIENT
Start: 2023-06-09 | End: 2023-06-15

## 2023-06-09 RX ORDER — ACETAMINOPHEN 500 MG
1000 TABLET ORAL ONCE
Refills: 0 | Status: COMPLETED | OUTPATIENT
Start: 2023-06-09 | End: 2023-06-09

## 2023-06-09 RX ORDER — APIXABAN 2.5 MG/1
2.5 TABLET, FILM COATED ORAL EVERY 12 HOURS
Refills: 0 | Status: DISCONTINUED | OUTPATIENT
Start: 2023-06-09 | End: 2023-06-09

## 2023-06-09 RX ORDER — ENOXAPARIN SODIUM 100 MG/ML
90 INJECTION SUBCUTANEOUS EVERY 24 HOURS
Refills: 0 | Status: DISCONTINUED | OUTPATIENT
Start: 2023-06-09 | End: 2023-06-11

## 2023-06-09 RX ORDER — ENOXAPARIN SODIUM 100 MG/ML
40 INJECTION SUBCUTANEOUS EVERY 24 HOURS
Refills: 0 | Status: DISCONTINUED | OUTPATIENT
Start: 2023-06-09 | End: 2023-06-09

## 2023-06-09 RX ADMIN — ENOXAPARIN SODIUM 40 MILLIGRAM(S): 100 INJECTION SUBCUTANEOUS at 08:15

## 2023-06-09 RX ADMIN — Medication 1000 MILLIGRAM(S): at 18:08

## 2023-06-09 RX ADMIN — Medication 400 MILLIGRAM(S): at 17:13

## 2023-06-09 RX ADMIN — LIDOCAINE 1 PATCH: 4 CREAM TOPICAL at 19:55

## 2023-06-09 RX ADMIN — PANTOPRAZOLE SODIUM 40 MILLIGRAM(S): 20 TABLET, DELAYED RELEASE ORAL at 05:20

## 2023-06-09 RX ADMIN — ENOXAPARIN SODIUM 90 MILLIGRAM(S): 100 INJECTION SUBCUTANEOUS at 21:15

## 2023-06-09 RX ADMIN — LIDOCAINE 1 PATCH: 4 CREAM TOPICAL at 01:06

## 2023-06-09 RX ADMIN — LIDOCAINE 1 PATCH: 4 CREAM TOPICAL at 13:00

## 2023-06-09 NOTE — DISCHARGE NOTE PROVIDER - PROVIDER TOKENS
PROVIDER:[TOKEN:[[id number]],FOLLOWUP:[2 weeks]] PROVIDER:[TOKEN:[[id number]],FOLLOWUP:[2 weeks]],PROVIDER:[TOKEN:[[id number]]]

## 2023-06-09 NOTE — PROGRESS NOTE ADULT - NSPROGADDITIONALINFOA_GEN_ALL_CORE
disposition: DC 6/9 home  1 hour spent in preparation of discharge   discussed with acp    Juli Velasco D.O.  Division of Hospital Medicine  Available on MS Teams disposition: DC pending placement to [facility]  discussed with acp    Juli Velasco D.O.  Division of Hospital Medicine  Available on MS Teams

## 2023-06-09 NOTE — DISCHARGE NOTE PROVIDER - NSFOLLOWUPCLINICS_GEN_ALL_ED_FT
A Gastroenterologist  Gastroenterology  .  [place]   Phone:   Fax:     [facility]  Gastroenterology  [address] [place] [address]  Phone: [phone]  Fax:     [facility]  Gastroenterology  [address] [place] [address] [address]  [place] [address]  Phone: [phone]  Fax:

## 2023-06-09 NOTE — PROGRESS NOTE ADULT - ATTENDING COMMENTS
DATE OF SERVICE: 06-09-23 @ 08:49    No events, tolerating CLD  Passing flatus, no longer feels nauseous or distended    Abd soft NT/ND    Ok for regular diet  Continue bowel regimen  Outpatient GI FU  No surgical intervention at this time, please recall as needed

## 2023-06-09 NOTE — DISCHARGE NOTE PROVIDER - NSDCMRMEDTOKEN_GEN_ALL_CORE_FT
acetaminophen 325 mg oral tablet: 3 tab(s) orally every 8 hours as needed for  mild pain  bisacodyl 5 mg oral delayed release tablet: 1 tab(s) orally once a day (at bedtime)  Eliquis 2.5 mg oral tablet: 1 tab(s) orally 2 times a day for 41 days  ondansetron 4 mg oral tablet: 1 tab(s) orally every 8 hours for 3 days  oxyCODONE 10 mg oral tablet: 1 tab(s) orally every 4 hours as needed for  severe pain  oxyCODONE 5 mg oral tablet: 1 tab(s) orally every 6 hours as needed for  moderate pain  pantoprazole 40 mg oral delayed release tablet: 1 tab(s) orally once a day  polyethylene glycol 3350 oral powder for reconstitution: 17 gram(s) orally once a day as needed for  constipation  senna (sennosides) 8.6 mg oral tablet: 2 tab(s) orally once a day (at bedtime) as needed for  constipation   acetaminophen 325 mg oral tablet: 3 tab(s) orally every 8 hours as needed for  mild pain  acetaminophen 325 mg oral tablet: 3 tab(s) orally every 8 hours  apixaban 2.5 mg oral tablet: 1 tab(s) orally every 12 hours  bisacodyl 5 mg oral delayed release tablet: 1 tab(s) orally once a day (at bedtime)  Eliquis 2.5 mg oral tablet: 1 tab(s) orally 2 times a day for 41 days  ondansetron 4 mg oral tablet: 1 tab(s) orally every 8 hours for 3 days  oxyCODONE 10 mg oral tablet: 1 tab(s) orally every 4 hours as needed for  severe pain  oxyCODONE 10 mg oral tablet: 1 tab(s) orally every 4 hours As needed Moderate Pain (4 - 6)  oxyCODONE 5 mg oral tablet: 1 tab(s) orally every 4 hours As needed Mild Pain (1 - 3)  oxyCODONE 5 mg oral tablet: 1 tab(s) orally every 6 hours as needed for  moderate pain  pantoprazole 40 mg oral delayed release tablet: 1 tab(s) orally once a day  pantoprazole 40 mg oral delayed release tablet: 1 tab(s) orally once a day (before a meal)  polyethylene glycol 3350 oral powder for reconstitution: 17 gram(s) orally once a day (at bedtime)  polyethylene glycol 3350 oral powder for reconstitution: 17 gram(s) orally once a day as needed for  constipation  senna (sennosides) 8.6 mg oral tablet: 2 tab(s) orally once a day (at bedtime) as needed for  constipation  senna leaf extract oral tablet: 2 tab(s) orally once a day (at bedtime)  traMADol 50 mg oral tablet: 1 tab(s) orally every 6 hours As needed Mild Pain (1 - 3)   acetaminophen 325 mg oral tablet: 3 tab(s) orally every 8 hours as needed for  mild pain  bisacodyl 5 mg oral delayed release tablet: 1 tab(s) orally once a day (at bedtime)  Eliquis 2.5 mg oral tablet: 1 tab(s) orally 2 times a day for 35 days  ondansetron 4 mg oral tablet: 1 tab(s) orally every 8 hours for 3 days  pantoprazole 40 mg oral delayed release tablet: 1 tab(s) orally once a day (before a meal)  polyethylene glycol 3350 oral powder for reconstitution: 17 gram(s) orally once a day as needed for  constipation  senna leaf extract oral tablet: 2 tab(s) orally once a day (at bedtime)   acetaminophen 325 mg oral tablet: 3 tab(s) orally every 8 hours as needed for  mild pain  bisacodyl 5 mg oral delayed release tablet: 1 tab(s) orally once a day (at bedtime)  Eliquis 2.5 mg oral tablet: 1 tab(s) orally 2 times a day for 35 days  pantoprazole 40 mg oral delayed release tablet: 1 tab(s) orally once a day (before a meal)  polyethylene glycol 3350 oral powder for reconstitution: 17 gram(s) orally once a day as needed for  constipation  senna leaf extract oral tablet: 2 tab(s) orally once a day (at bedtime)

## 2023-06-09 NOTE — DISCHARGE NOTE PROVIDER - NSDCFUSCHEDAPPT_GEN_ALL_CORE_FT
Bautista Oakley  [facility]  ORTHOSURG [address] [facility] [facility]  Scheduled Appointment: 07/31/2023

## 2023-06-09 NOTE — PROGRESS NOTE ADULT - PROBLEM SELECTOR PLAN 1
- resolved  - XR with dilated Bowel loops concerning for ileus; CT Scan ruled out SBO; showed some possible signs of colitis/ underlying lesion  - tolerating regular diet  - Minimize opioids - resolved  - XR with dilated Bowel loops concerning for ileus; CT Scan ruled out SBO; showed some possible signs of colitis/ underlying lesion  - tolerating regular diet okay  - Minimize opioids

## 2023-06-09 NOTE — DISCHARGE NOTE PROVIDER - CARE PROVIDER_API CALL
[facility]  [facility]  [address] [place] [address], [address]  [place] [address]  Phone: [phone]  Fax: [phone]  Follow Up Time: 2 weeks   [facility]  [facility]  [address], [address]  [place] [address]  Phone: [phone]  Fax: [phone]  Follow Up Time: 2 weeks    Bautista Oakley  Orthopaedic Surgery  [address], [address]  [place] [address]  Phone: [phone]  Fax: [phone]  Follow Up Time:

## 2023-06-09 NOTE — DISCHARGE NOTE PROVIDER - NSDCCPCAREPLAN_GEN_ALL_CORE_FT
PRINCIPAL DISCHARGE DIAGNOSIS  Diagnosis: Postoperative ileus  Assessment and Plan of Treatment: You came to the hospital for nausea and abdominal pain due to a bowel obstruction that was temporary as a side effect of your prior orthopedic operation. Surgery, gastroenterology saw you. Please see gastroenterology and your pcp in 1-2 weeks for further care. Avoid opioids.      SECONDARY DISCHARGE DIAGNOSES  Diagnosis: History of repair of hip fracture  Assessment and Plan of Treatment: You had a CT scan to monitor your prior hip fracture repair. Orthopedics saw you and would like you to follow up with them outpatient.

## 2023-06-09 NOTE — PROGRESS NOTE ADULT - PROBLEM SELECTOR PLAN 3
- Patient had recent mechanical fall; [facility]-- Underwent Right Total Hip Arthroplasty  - DVT ppx on 2.5mg Eliquis for 6 weeks-- resume eliquis  - Limiting Opioid regiment until BM - Patient had recent mechanical fall; was seen at [facility]-- Underwent Right Total Hip Arthroplasty  - DVT ppx on 2.5mg Eliquis for 6 weeks-- resume eliquis  - Limiting Opioid regiment until BM  - RENETTA

## 2023-06-09 NOTE — DISCHARGE NOTE PROVIDER - NSDCFUADDINST_GEN_ALL_CORE_FT
Weight bear as tolerated on right leg -Weight bear as tolerated on right leg  - followup with GI outpatient once ileus resolves for colonoscopy to evaluate for thickened descending colon

## 2023-06-09 NOTE — PROGRESS NOTE ADULT - ASSESSMENT
67F hx of osteoarthritis, hysterectomy, recent pelvic fx s/p R DAREK is here today for vomiting with CT A/P concerning for likely postop ileus with CT A/P Showing evidence of possible colitis vs lesion. postop ileus is most likely given recent DAREK, lack of food intake, as well as minimal ambulation with plan for nonoperative management with IV fluids and bowel rest.

## 2023-06-09 NOTE — PROGRESS NOTE ADULT - ASSESSMENT
67 female history of osteoarthritis, hysterectomy, s/p right hip fracture for mechanical fall s/p ORIF on 5/31 at [facility] presents with nausea, vomiting, loose BM from [facility]. AXR with distended ascending, transverse, and descending colon. CT scan without SBO, questionable descending colon thickening. Surgery consulted to r/o bowel obstruction.    Plan/recommendations    - No acute surgical intervention  - GI recommendations appreciated  - Advance diet as tolerated, can have regular diet  - IVF  - Replete electrolytes  - Bowel regimen  - Avoid narcotics  - Surgery will follow    Red Team Surgery  [id number]  67 female history of osteoarthritis, hysterectomy, s/p right hip fracture for mechanical fall s/p ORIF on 5/31 at [facility] presents with nausea, vomiting, loose BM from [facility]. AXR with distended ascending, transverse, and descending colon. CT scan without SBO, questionable descending colon thickening. Surgery consulted to r/o bowel obstruction.    Plan/recommendations    - No acute surgical intervention  - GI recommendations appreciated  - Advance diet as tolerated, can have regular diet  - Replete electrolytes  - Bowel regimen  - Avoid narcotics  - Please call back or page if needed    Red Team Surgery  [id number]

## 2023-06-09 NOTE — DISCHARGE NOTE PROVIDER - NSDCFUADDAPPT_GEN_ALL_CORE_FT
Bautista Oakley  Orthopaedic Surgery  [address] [facility], [address]  [place] [address]  Phone: [phone]  Fax: [phone]  Established Patient  Follow Up Time: 7-10 days, call for appointmenet    - followup outpatient once ileus resolves for colonoscopy to evaluate for thickened descending colon   - Please provide patient with Gastroenterology Clinic outpatient follow up number for an appointment [phone] ([facility]) or [phone] ([facility] at [address] [place] [address])    APPTS ARE READY TO BE MADE: [X] YES    Best Family or Patient Contact (if needed):    1: Follow up with PCP Dr. Blanca Tan within 2-4 weeks outpatient  2: Follow up with orthopedic surgery Dr. Bautista Oakley in 7-10 days, call [phone] for appointment  3: Outpatient follow-up with GI at GI clinic, call [phone] ([facility]) or [phone] ([facility] at [address] [place] [address]) to schedule an appointment                      APPTS ARE READY TO BE MADE: [X] YES    Best Family or Patient Contact (if needed):    1: Follow up with PCP Dr. Blanca Tan within 2-4 weeks outpatient  2: Follow up with orthopedic surgery Dr. Bautista aOkley in 7-10 days, call [phone] for appointment  3: Outpatient follow-up with GI at GI clinic, call [phone] ([facility]) or [phone] ([facility] at [address] [place] [address]) to schedule an appointment   Patient is being discharged to rehab. Patient/caregiver will arrange follow up care appointments.

## 2023-06-09 NOTE — PROGRESS NOTE ADULT - SUBJECTIVE AND OBJECTIVE BOX
tolerating diet well, no complaints.    GENERAL: No fevers, no chills.  EYES: No blurry vision,  No photophobia  ENT: No sore throat.  No dysphagia  Cardiovascular: No chest pain, palpitations, orthopnea  Pulmonary: No cough, no wheezing. No shortness of breath  Gastrointestinal: + abdominal pain, no diarrhea, no constipation.    Musculoskeletal: No weakness.  No myalgias.  Dermatology:  No rashes.  Neuro: No Headache.  No vertigo.  No dizziness.  Psych: No anxiety, no depression.  Denies suicidal thoughts.    MEDICATIONS  (STANDING):  apixaban 2.5 milliGRAM(s) Oral every 12 hours  lidocaine   4% Patch 1 Patch Transdermal daily  lidocaine   4% Patch 1 Patch Transdermal daily  pantoprazole  Injectable 40 milliGRAM(s) IV Push <User Schedule>  polyethylene glycol 3350 17 Gram(s) Oral daily  senna 2 Tablet(s) Oral at bedtime    MEDICATIONS  (PRN):  acetaminophen     Tablet .. 650 milliGRAM(s) Oral every 6 hours PRN Mild Pain (1 - 3), Moderate Pain (4 - 6)  acetaminophen     Tablet .. 975 milliGRAM(s) Oral every 6 hours PRN Severe Pain (7 - 10)  bisacodyl Suppository 10 milliGRAM(s) Rectal daily PRN Constipation    Vital Signs Last 24 Hrs  T(C): 36.8 (2023 11:45), Max: 37.4 (2023 04:20)  T(F): 98.2 (2023 11:45), Max: 99.4 (2023 04:20)  HR: 80 (2023 11:45) (72 - 80)  BP: 118/72 (2023 11:45) (115/62 - 118/73)  BP(mean): --  RR: 16 (2023 11:45) (16 - 18)  SpO2: 98% (2023 11:45) (96% - 98%)    Parameters below as of 2023 11:45  Patient On (Oxygen Delivery Method): room air    GENERAL: NAD  HEAD:  Atraumatic, Normocephalic  EYES: EOMI, PERRLA, conjunctiva and sclera clear  ENT: Pharynx not erythematous  PULMONARY: Clear to auscultation bilaterally; No wheeze  CARDIOVASCULAR: Regular rate and rhythm; No murmurs, rubs, or gallops  ABDOMEN: Soft, Nontender, Nondistended; Bowel sounds present  EXTREMITIES:  2+ Peripheral Pulses, No clubbing, cyanosis, or edema  MUSCULOSKELETAL: No calf tenderness  PSYCH: AAOx3, normal affect  SKIN: warm and dry, No rashes or lesions    .  LABS:                         7.6    5.10  )-----------( 362      ( 2023 07:03 )             24.4     06-    141  |  106  |  12  ----------------------------<  82  4.1   |  22  |  0.51    Ca    8.1<L>      2023 07:03  Phos  3.0     06-08  Mg     2.0     06-08    TPro  5.0<L>  /  Alb  2.8<L>  /  TBili  2.0<H>  /  DBili  1.2<H>  /  AST  17  /  ALT  14  /  AlkPhos  114  06-09    PT/INR - ( 2023 06:59 )   PT: 14.3 sec;   INR: 1.24 ratio           Urinalysis Basic - ( 2023 15:49 )    Color: Yellow / Appearance: Slightly Turbid / S.026 / pH: x  Gluc: x / Ketone: Small  / Bili: Negative / Urobili: 3 mg/dL   Blood: x / Protein: Trace / Nitrite: Negative   Leuk Esterase: Negative / RBC: 2 /hpf / WBC 1 /HPF   Sq Epi: x / Non Sq Epi: x / Bacteria: Negative            RADIOLOGY, EKG & ADDITIONAL TESTS: Reviewed.  tolerating diet well- small bites, if takes more than that becomes very nauseous.     GENERAL: No fevers, no chills.  EYES: No blurry vision,  No photophobia  ENT: No sore throat.  No dysphagia  Cardiovascular: No chest pain, palpitations, orthopnea  Pulmonary: No cough, no wheezing. No shortness of breath  Gastrointestinal: + abdominal pain, no diarrhea, no constipation.    Musculoskeletal: No weakness.  No myalgias.  Dermatology:  No rashes.  Neuro: No Headache.  No vertigo.  No dizziness.  Psych: No anxiety, no depression.  Denies suicidal thoughts.    MEDICATIONS  (STANDING):  apixaban 2.5 milliGRAM(s) Oral every 12 hours  lidocaine   4% Patch 1 Patch Transdermal daily  lidocaine   4% Patch 1 Patch Transdermal daily  pantoprazole  Injectable 40 milliGRAM(s) IV Push <User Schedule>  polyethylene glycol 3350 17 Gram(s) Oral daily  senna 2 Tablet(s) Oral at bedtime    MEDICATIONS  (PRN):  acetaminophen     Tablet .. 650 milliGRAM(s) Oral every 6 hours PRN Mild Pain (1 - 3), Moderate Pain (4 - 6)  acetaminophen     Tablet .. 975 milliGRAM(s) Oral every 6 hours PRN Severe Pain (7 - 10)  bisacodyl Suppository 10 milliGRAM(s) Rectal daily PRN Constipation    Vital Signs Last 24 Hrs  T(C): 36.8 (2023 11:45), Max: 37.4 (2023 04:20)  T(F): 98.2 (2023 11:45), Max: 99.4 (2023 04:20)  HR: 80 (2023 11:45) (72 - 80)  BP: 118/72 (2023 11:45) (115/62 - 118/73)  BP(mean): --  RR: 16 (2023 11:45) (16 - 18)  SpO2: 98% (2023 11:45) (96% - 98%)    Parameters below as of 2023 11:45  Patient On (Oxygen Delivery Method): room air    GENERAL: NAD  HEAD:  Atraumatic, Normocephalic  EYES: EOMI, PERRLA, conjunctiva and sclera clear  ENT: Pharynx not erythematous  PULMONARY: Clear to auscultation bilaterally; No wheeze  CARDIOVASCULAR: Regular rate and rhythm; No murmurs, rubs, or gallops  ABDOMEN: Soft, Nontender, Nondistended; Bowel sounds present  EXTREMITIES:  2+ Peripheral Pulses, No clubbing, cyanosis, or edema  MUSCULOSKELETAL: No calf tenderness  PSYCH: AAOx3, normal affect  SKIN: warm and dry, No rashes or lesions    .  LABS:                         7.6    5.10  )-----------( 362      ( 2023 07:03 )             24.4     06-    141  |  106  |  12  ----------------------------<  82  4.1   |  22  |  0.51    Ca    8.1<L>      2023 07:03  Phos  3.0     06-08  Mg     2.0     06-08    TPro  5.0<L>  /  Alb  2.8<L>  /  TBili  2.0<H>  /  DBili  1.2<H>  /  AST  17  /  ALT  14  /  AlkPhos  114  06-09    PT/INR - ( 2023 06:59 )   PT: 14.3 sec;   INR: 1.24 ratio           Urinalysis Basic - ( 2023 15:49 )    Color: Yellow / Appearance: Slightly Turbid / S.026 / pH: x  Gluc: x / Ketone: Small  / Bili: Negative / Urobili: 3 mg/dL   Blood: x / Protein: Trace / Nitrite: Negative   Leuk Esterase: Negative / RBC: 2 /hpf / WBC 1 /HPF   Sq Epi: x / Non Sq Epi: x / Bacteria: Negative            RADIOLOGY, EKG & ADDITIONAL TESTS: Reviewed.

## 2023-06-09 NOTE — DISCHARGE NOTE PROVIDER - HOSPITAL COURSE
67F hx of osteoarthritis, hysterectomy, recent pelvic fx s/p R DAREK is here today for vomiting with CT A/P concerning for likely postop ileus with CT A/P Showing evidence of possible colitis vs lesion. postop ileus is most likely given recent DAREK, lack of food intake, as well as minimal ambulation with plan for nonoperative management with IV fluids and bowel rest.     Problem/Plan - 1:  ·  Problem: Postoperative ileus.   ·  Plan: - resolved  - XR with dilated Bowel loops concerning for ileus; CT Scan ruled out SBO; showed some possible signs of colitis/underlying lesion  - tolerating regular diet  - Minimize opioids.  - pcp f/u  - GI f/u     Problem/Plan - 2:  ·  Problem: Pubic ramus fracture.   ·  Plan: - CT significant for pubic rami fracture not visualized on earlier CT Scans   - orthopedics-- no surgical intervention.  - ortho f/u outpt     Problem/Plan - 3:  ·  Problem: History of repair of hip fracture.   ·  Plan: - Patient had recent mechanical fall; was seen at [facility]-- Underwent Right Total Hip Arthroplasty  - DVT ppx on 2.5mg Eliquis for 6 weeks-- resume eliquis  - Limiting Opioid regiment until BM.     Problem/Plan - 4:  ·  Problem: Need for prophylactic measure.   ·  Plan: DVT ppx: Lovenox 40mg qd.    Dispo: RENETTA    Patient seen and evaluated, no acute somatic complaints. Reviewed discharge medications with patient; All new medications requiring new prescriptions were sent to the pharmacy of patient's choice. Reviewed need for prescription for previous home medications and new prescriptions sent if requested. Medically cleared/stable for discharge as per Dr. Velasco on 6/9/23 with close outpatient follow up within 1-2 weeks of discharge. Patient understands and agrees with plan of care. 67yoF w/ PMHx of osteoarthritis, hysterectomy, recent pelvic fx after mechanical fall s/p R DAREK sent from rehab 6/7 with nausea, vomiting, loose BM, AXR with distended ascending, transverse, and descending colon, CT scan without SBO, but with questionable descending colon thickening possibly a mild ileus per GI and surgery, improving now with daily BMs d/c planning for [facility] pending improvement in hypotension and f/u UA/UCx sent.         Postoperative ileus.   - XR with dilated Bowel loops concerning for ileus; CT Scan ruled out SBO; showed some possible signs of colitis/ underlying lesion  - minimized opioids  - seen by GI and patient declines colonoscopy at this time given limited mobility due to hip fracture  - miralax BID, titrated for 1-2 BM a day   - resolved  - outpatient follow-up with GI at GI clinic, call [phone] ([facility] [place]) or [phone] ([facility] at [address] [place] [address]) to schedule an appointment     Pubic ramus fracture.   - CT significant for pubic rami fracture not visualized on earlier CT Scans   - Per inpt orthopedics- no surgical intervention.    History of repair of hip fracture.   - Patient had recent mechanical fall; was seen at [facility]-- Underwent Right Total Hip Arthroplasty  - DVT ppx on 2.5mg Eliquis for 6 weeks  - limiting opioid regimen monitoring stool count  - now agreeable to RENETTA to return to [facility]      Hypotension.   -CBC appears hemoconcentrated  -Negative orthostatics  -Hypotension persists on 6/14 -- status post bolus 500cc x1 and seen with negative urinalysis     Vitamin D deficiency.   -Vit D  level 17.6  -Reports known osteoporosis and was supposed to get bisphos prior to fracture  -Did not tolerate high dose vit d in past (renal stones)  -Agrees to Vit D 400 iu daily and will follow up outpatient.        Patient seen and evaluated, no acute somatic complaints. Reviewed discharge medications with patient; All new medications requiring new prescriptions were sent to the pharmacy of patient's choice. Reviewed need for prescription for previous home medications and new prescriptions sent if requested. Medically cleared/stable for discharge as per  ____________ with close outpatient follow up within 1-2 weeks of discharge. Patient understands and agrees with plan of care. 67yoF w/ PMHx of osteoarthritis, hysterectomy, recent pelvic fx after mechanical fall s/p R DAREK sent from rehab 6/7 with nausea, vomiting, loose BM, AXR with distended ascending, transverse, and descending colon, CT scan without SBO, but with questionable descending colon thickening possibly a mild ileus per GI and surgery, improving now with daily BMs d/c planning for [facility] pending improvement in hypotension and f/u UA/UCx sent.         Postoperative ileus.   - XR with dilated Bowel loops concerning for ileus; CT Scan ruled out SBO; showed some possible signs of colitis/ underlying lesion  - minimized opioids  - seen by GI and patient declines colonoscopy at this time given limited mobility due to hip fracture  - miralax BID, titrated for 1-2 BM a day   - resolved  - outpatient follow-up with GI at GI clinic, call [phone] ([facility] [place]) or [phone] ([facility] at [address] [place] [address]) to schedule an appointment     Pubic ramus fracture.   - CT significant for pubic rami fracture not visualized on earlier CT Scans   - Per inpt orthopedics- no surgical intervention.    History of repair of hip fracture.   - Patient had recent mechanical fall; was seen at [facility]-- Underwent Right Total Hip Arthroplasty  - DVT ppx on 2.5mg Eliquis for 6 weeks  - limiting opioid regimen monitoring stool count  - now agreeable to RENETTA to return to [facility]      Hypotension.   -CBC appears hemoconcentrated  -Negative orthostatics  -Hypotension persists on 6/14 -- status post bolus 500cc x1 and seen with negative urinalysis     Vitamin D deficiency.   -Vit D  level 17.6  -Reports known osteoporosis and was supposed to get bisphos prior to fracture  -Did not tolerate high dose vit d in past (renal stones)  -Agrees to Vit D 400 iu daily and will follow up outpatient.        Patient seen and evaluated, no acute somatic complaints. Reviewed discharge medications with patient; All new medications requiring new prescriptions were sent to the pharmacy of patient's choice. Reviewed need for prescription for previous home medications and new prescriptions sent if requested. Medically cleared/stable for discharge as per Dr. Cosby, with close outpatient follow up within 1-2 weeks of discharge. Patient understands and agrees with plan of care. 			 67yoF w/ PMHx of osteoarthritis, hysterectomy, recent pelvic fx after mechanical fall s/p R DAREK sent from rehab 6/7 with nausea, vomiting, loose BM, AXR with distended ascending, transverse, and descending colon, CT scan without SBO, but with questionable descending colon thickening possibly a mild ileus per GI and surgery, improving now with daily BMs d/c planning for [facility] pending improvement in hypotension and f/u UA/UCx sent.         Postoperative ileus.   - XR with dilated Bowel loops concerning for ileus; CT Scan ruled out SBO; showed some possible signs of colitis/ underlying lesion  - minimized opioids  - seen by GI and patient declines colonoscopy at this time given limited mobility due to hip fracture  - miralax BID, titrated for 1-2 BM a day   - resolved  - outpatient follow-up with GI at GI clinic, call [phone] ([facility] [place]) or [phone] ([facility] at [address] [place] [address]) to schedule an appointment     Pubic ramus fracture.   - CT significant for pubic rami fracture not visualized on earlier CT Scans   - Per inpt orthopedics- no surgical intervention.    History of repair of hip fracture.   - Patient had recent mechanical fall; was seen at [facility]-- Underwent Right Total Hip Arthroplasty  - DVT ppx on 2.5mg Eliquis for 6 weeks (From June 1)  - limiting opioid regimen monitoring stool count  - now agreeable to RENETTA to return to [facility]      Hypotension.   -CBC appears hemoconcentrated  -improved with IVF    Vitamin D deficiency.   -Vit D  level 17.6  -Reports known osteoporosis and was supposed to get bisphos prior to fracture  -Did not tolerate high dose vit d in past (renal stones)  -Agrees to Vit D 400 iu daily and will follow up outpatient.        Patient seen and evaluated, no acute somatic complaints. Reviewed discharge medications with patient; All new medications requiring new prescriptions were sent to the pharmacy of patient's choice. Reviewed need for prescription for previous home medications and new prescriptions sent if requested. Medically cleared/stable for discharge as per Dr. Cosby, with close outpatient follow up within 1-2 weeks of discharge. Patient understands and agrees with plan of care.

## 2023-06-09 NOTE — PROGRESS NOTE ADULT - SUBJECTIVE AND OBJECTIVE BOX
67 female history of osteoarthritis, hysterectomy, s/p right hip fracture for mechanical fall s/p ORIF on 5/31 at [facility] presents with nausea, vomiting and loose BM.  [facility] for several episodes of nonbloody nonbilious emesis (last episode of emesis 06/07 night). Denies fever, chills, melena, hematochezia, hematemesis, urinary symptoms, rash.    Patient states she was passing flatus earlier in the AM and had a loose bowel movement.    Abdominal xray with distended ascending, transverse, and descending colon. CT scan without SBO, questionable descending colon thickening.  Surgery consulted to r/o bowel obstruction.    Overnight events:  Passing flatus, denies nausea or vomiting. Tolerating CLD.      Vital Signs Last 24 Hrs  T(C): 37.4 (09 Jun 2023 04:20), Max: 37.4 (09 Jun 2023 04:20)  T(F): 99.4 (09 Jun 2023 04:20), Max: 99.4 (09 Jun 2023 04:20)  HR: 72 (09 Jun 2023 04:20) (70 - 78)  BP: 115/62 (09 Jun 2023 04:20) (103/65 - 118/73)  BP(mean): --  RR: 18 (09 Jun 2023 04:20) (18 - 18)  SpO2: 96% (09 Jun 2023 04:20) (96% - 100%)    Parameters below as of 09 Jun 2023 04:20  Patient On (Oxygen Delivery Method): room air        Physical exam  General: NAD  Cardiac: Regular rate  Respiratory: Nonlabored breathing  Abdominal Exam: soft, mildly distended, mild tenderness, no rebound, no guarding  Muskuloskeletal: Moves all 4 extremities spontaneously  Neurological: Alert and oriented           LABS:                          7.6    5.10  )-----------( 362      ( 09 Jun 2023 07:03 )             24.4     06-09    141  |  106  |  12  ----------------------------<  82  4.1   |  22  |  0.51    Ca    8.1<L>      09 Jun 2023 07:03  Phos  3.0     06-08  Mg     2.0     06-08    TPro  5.0<L>  /  Alb  2.8<L>  /  TBili  2.0<H>  /  DBili  1.2<H>  /  AST  17  /  ALT  14  /  AlkPhos  114  06-09    PT/INR - ( 08 Jun 2023 06:59 )   PT: 14.3 sec;   INR: 1.24 ratio         PTT - ( 07 Jun 2023 13:27 )  PTT:25.8 sec                      ACC: [id number] EXAM:  CT ABDOMEN AND PELVIS IC   ORDERED BY: SILVERIO HAWKINS     PROCEDURE DATE:  06/07/2023          INTERPRETATION:  CLINICAL INFORMATION: Abdominal distention, nausea and   vomiting. Status post right hip replacement. Evaluate for small bowel   obstruction or ileus.    COMPARISON: CT scan of the abdomen and pelvis from 1/22/2015    CONTRAST/COMPLICATIONS:  IV Contrast: Omnipaque 350  90 cc administered   10 cc discarded  Oral Contrast: NONE  Complications: None reported at time of study completion    PROCEDURE:  CT of the Abdomen and Pelvis was performed.  Sagittal and coronal reformats were performed.    FINDINGS:  LOWER CHEST: Within normal limits.    LIVER: Within normal limits.  BILE DUCTS: Normal caliber.  GALLBLADDER: Within normal limits.  SPLEEN: Within normal limits.  PANCREAS: Within normal limits.  ADRENALS: Within normal limits.  KIDNEYS/URETERS: Within normal limits.    BLADDER: Within normal limits.  REPRODUCTIVE ORGANS: Status post hysterectomy. 1.1 cm soft tissue density   at the left vaginal cuff on series 2 image 92 is stable when compared   with 1/22/2015    BOWEL: Lack of oral contrast limits evaluation of the descending colon.   Question of mild wall thickening. Underlying lesion cannot be excluded.   Mild submucosal deposition of fat. Submucosal deposition of fat is seen   in the ascending colon. No bowel obstruction. Appendix not visualized. No   inflammation in the right lower quadrant.  PERITONEUM: No ascites. Presacral edema.  VESSELS: Within normal limits.  RETROPERITONEUM/LYMPH NODES: No lymphadenopathy.  ABDOMINAL WALL: Small umbilical hernia containing fat.  BONES: Status post right hip arthroplasty with postsurgical changes.   Fracture of the right inferior pubic ramus. Degenerative changes of bone.    IMPRESSION:  No small bowel obstruction. Question of wall thickening in the descending   colon which is limited in evaluation. Colitis or underlying lesion cannot   be excluded. Clinical correlation and further evaluation is recommended.    Mild submucosal deposition of fat in the colon may represent chronic   inflammation.    1.1 cm hyperdense structure inseparable from left vaginal cuff is   unchanged when compared with 1/22/2015.        --- End of Report ---            ROBIN GONZALES MD; Attending Radiologist  This document has been electronically signed. Jun 7 2023  5:36PM

## 2023-06-10 LAB
ALBUMIN SERPL ELPH-MCNC: 2.9 G/DL — LOW (ref 3.3–5)
ALP SERPL-CCNC: 123 U/L — HIGH (ref 40–120)
ALT FLD-CCNC: 16 U/L — SIGNIFICANT CHANGE UP (ref 10–45)
ANION GAP SERPL CALC-SCNC: 13 MMOL/L — SIGNIFICANT CHANGE UP (ref 5–17)
AST SERPL-CCNC: 16 U/L — SIGNIFICANT CHANGE UP (ref 10–40)
BILIRUB DIRECT SERPL-MCNC: 0.6 MG/DL — HIGH (ref 0–0.3)
BILIRUB INDIRECT FLD-MCNC: 0.8 MG/DL — SIGNIFICANT CHANGE UP (ref 0.2–1)
BILIRUB SERPL-MCNC: 1.4 MG/DL — HIGH (ref 0.2–1.2)
BILIRUB SERPL-MCNC: 1.4 MG/DL — HIGH (ref 0.2–1.2)
BUN SERPL-MCNC: 12 MG/DL — SIGNIFICANT CHANGE UP (ref 7–23)
CALCIUM SERPL-MCNC: 8.3 MG/DL — LOW (ref 8.4–10.5)
CHLORIDE SERPL-SCNC: 103 MMOL/L — SIGNIFICANT CHANGE UP (ref 96–108)
CO2 SERPL-SCNC: 23 MMOL/L — SIGNIFICANT CHANGE UP (ref 22–31)
CREAT SERPL-MCNC: 0.52 MG/DL — SIGNIFICANT CHANGE UP (ref 0.5–1.3)
EGFR: 102 ML/MIN/1.73M2 — SIGNIFICANT CHANGE UP
GLUCOSE SERPL-MCNC: 87 MG/DL — SIGNIFICANT CHANGE UP (ref 70–99)
HCT VFR BLD CALC: 25.4 % — LOW (ref 34.5–45)
HGB BLD-MCNC: 8.1 G/DL — LOW (ref 11.5–15.5)
MCHC RBC-ENTMCNC: 30 PG — SIGNIFICANT CHANGE UP (ref 27–34)
MCHC RBC-ENTMCNC: 31.9 GM/DL — LOW (ref 32–36)
MCV RBC AUTO: 94.1 FL — SIGNIFICANT CHANGE UP (ref 80–100)
NRBC # BLD: 0 /100 WBCS — SIGNIFICANT CHANGE UP (ref 0–0)
PLATELET # BLD AUTO: 399 K/UL — SIGNIFICANT CHANGE UP (ref 150–400)
POTASSIUM SERPL-MCNC: 3.3 MMOL/L — LOW (ref 3.5–5.3)
POTASSIUM SERPL-SCNC: 3.3 MMOL/L — LOW (ref 3.5–5.3)
PROT SERPL-MCNC: 5.6 G/DL — LOW (ref 6–8.3)
RBC # BLD: 2.7 M/UL — LOW (ref 3.8–5.2)
RBC # FLD: 13.2 % — SIGNIFICANT CHANGE UP (ref 10.3–14.5)
SODIUM SERPL-SCNC: 139 MMOL/L — SIGNIFICANT CHANGE UP (ref 135–145)
WBC # BLD: 5.48 K/UL — SIGNIFICANT CHANGE UP (ref 3.8–10.5)
WBC # FLD AUTO: 5.48 K/UL — SIGNIFICANT CHANGE UP (ref 3.8–10.5)

## 2023-06-10 PROCEDURE — 99232 SBSQ HOSP IP/OBS MODERATE 35: CPT

## 2023-06-10 RX ORDER — POTASSIUM CHLORIDE 20 MEQ
40 PACKET (EA) ORAL ONCE
Refills: 0 | Status: DISCONTINUED | OUTPATIENT
Start: 2023-06-10 | End: 2023-06-10

## 2023-06-10 RX ORDER — POTASSIUM CHLORIDE 20 MEQ
10 PACKET (EA) ORAL
Refills: 0 | Status: COMPLETED | OUTPATIENT
Start: 2023-06-10 | End: 2023-06-10

## 2023-06-10 RX ADMIN — ENOXAPARIN SODIUM 90 MILLIGRAM(S): 100 INJECTION SUBCUTANEOUS at 22:18

## 2023-06-10 RX ADMIN — LIDOCAINE 1 PATCH: 4 CREAM TOPICAL at 12:10

## 2023-06-10 RX ADMIN — Medication 975 MILLIGRAM(S): at 20:00

## 2023-06-10 RX ADMIN — PANTOPRAZOLE SODIUM 40 MILLIGRAM(S): 20 TABLET, DELAYED RELEASE ORAL at 05:13

## 2023-06-10 RX ADMIN — Medication 975 MILLIGRAM(S): at 21:00

## 2023-06-10 RX ADMIN — Medication 100 MILLIEQUIVALENT(S): at 09:54

## 2023-06-10 RX ADMIN — LIDOCAINE 1 PATCH: 4 CREAM TOPICAL at 01:10

## 2023-06-10 NOTE — PROGRESS NOTE ADULT - PROBLEM SELECTOR PLAN 1
- resolved  - XR with dilated Bowel loops concerning for ileus; CT Scan ruled out SBO; showed some possible signs of colitis/ underlying lesion  - tolerating regular diet okay-- small bites, not able to complete meals  - Minimize opioids

## 2023-06-10 NOTE — PROGRESS NOTE ADULT - SUBJECTIVE AND OBJECTIVE BOX
tolerating diet somewhat- small bites, if takes more than that becomes very nauseous.     GENERAL: No fevers, no chills.  EYES: No blurry vision,  No photophobia  ENT: No sore throat.  No dysphagia  Cardiovascular: No chest pain, palpitations, orthopnea  Pulmonary: No cough, no wheezing. No shortness of breath  Gastrointestinal: + abdominal pain, no diarrhea, no constipation.    Musculoskeletal: No weakness.  No myalgias.  Dermatology:  No rashes.  Neuro: No Headache.  No vertigo.  No dizziness.  Psych: No anxiety, no depression.  Denies suicidal thoughts.    MEDICATIONS  (STANDING):  enoxaparin Injectable 90 milliGRAM(s) SubCutaneous every 24 hours  lidocaine   4% Patch 1 Patch Transdermal daily  lidocaine   4% Patch 1 Patch Transdermal daily  pantoprazole  Injectable 40 milliGRAM(s) IV Push <User Schedule>  polyethylene glycol 3350 17 Gram(s) Oral daily  senna 2 Tablet(s) Oral at bedtime    MEDICATIONS  (PRN):  acetaminophen     Tablet .. 650 milliGRAM(s) Oral every 6 hours PRN Mild Pain (1 - 3), Moderate Pain (4 - 6)  acetaminophen     Tablet .. 975 milliGRAM(s) Oral every 6 hours PRN Severe Pain (7 - 10)  bisacodyl Suppository 10 milliGRAM(s) Rectal daily PRN Constipation    Vital Signs Last 24 Hrs  T(C): 36.8 (10 Kelvin 2023 04:27), Max: 37.1 (09 Jun 2023 19:19)  T(F): 98.3 (10 Kelvin 2023 04:27), Max: 98.7 (09 Jun 2023 19:19)  HR: 71 (10 Kelvin 2023 04:27) (71 - 86)  BP: 112/67 (10 Kelvin 2023 04:27) (112/67 - 118/72)  BP(mean): --  RR: 18 (10 Kelvin 2023 04:27) (16 - 18)  SpO2: 98% (10 Kelvin 2023 04:27) (97% - 99%)    Parameters below as of 10 Kelvni 2023 04:27  Patient On (Oxygen Delivery Method): room air    GENERAL: NAD  HEAD:  Atraumatic, Normocephalic  EYES: EOMI, PERRLA, conjunctiva and sclera clear  ENT: Pharynx not erythematous  PULMONARY: Clear to auscultation bilaterally; No wheeze  CARDIOVASCULAR: Regular rate and rhythm; No murmurs, rubs, or gallops  ABDOMEN: Soft, Nontender, Nondistended; Bowel sounds present  EXTREMITIES:  2+ Peripheral Pulses, No clubbing, cyanosis, or edema  MUSCULOSKELETAL: No calf tenderness  PSYCH: AAOx3, normal affect  SKIN: warm and dry, No rashes or lesions    .  LABS:                         7.6    5.10  )-----------( 362      ( 09 Jun 2023 07:03 )             24.4     06-09    141  |  106  |  12  ----------------------------<  82  4.1   |  22  |  0.51    Ca    8.1<L>      09 Jun 2023 07:03    TPro  5.0<L>  /  Alb  2.8<L>  /  TBili  2.0<H>  /  DBili  1.2<H>  /  AST  17  /  ALT  14  /  AlkPhos  114  06-09              RADIOLOGY, EKG & ADDITIONAL TESTS: Reviewed.

## 2023-06-10 NOTE — PROGRESS NOTE ADULT - NSPROGADDITIONALINFOA_GEN_ALL_CORE
disposition: DEVIKA JACKSON once able to tolerate po    Jlui Velasco D.O.  Division of Hospital Medicine  Available on MS Teams

## 2023-06-10 NOTE — PROGRESS NOTE ADULT - PROBLEM SELECTOR PLAN 3
- Patient had recent mechanical fall; was seen at [facility]-- Underwent Right Total Hip Arthroplasty  - DVT ppx on 2.5mg Eliquis for 6 weeks-- currently on lovenox-- - will transition to eliquis once agreeable to po meds  - Limiting Opioid regiment until BM  - RENETTA

## 2023-06-11 LAB
ALBUMIN SERPL ELPH-MCNC: 3 G/DL — LOW (ref 3.3–5)
ALP SERPL-CCNC: 139 U/L — HIGH (ref 40–120)
ALT FLD-CCNC: 18 U/L — SIGNIFICANT CHANGE UP (ref 10–45)
ANION GAP SERPL CALC-SCNC: 15 MMOL/L — SIGNIFICANT CHANGE UP (ref 5–17)
AST SERPL-CCNC: 26 U/L — SIGNIFICANT CHANGE UP (ref 10–40)
BILIRUB DIRECT SERPL-MCNC: 0.4 MG/DL — HIGH (ref 0–0.3)
BILIRUB INDIRECT FLD-MCNC: 0.8 MG/DL — SIGNIFICANT CHANGE UP (ref 0.2–1)
BILIRUB SERPL-MCNC: 1.2 MG/DL — SIGNIFICANT CHANGE UP (ref 0.2–1.2)
BILIRUB SERPL-MCNC: 1.2 MG/DL — SIGNIFICANT CHANGE UP (ref 0.2–1.2)
BUN SERPL-MCNC: 9 MG/DL — SIGNIFICANT CHANGE UP (ref 7–23)
CALCIUM SERPL-MCNC: 8.7 MG/DL — SIGNIFICANT CHANGE UP (ref 8.4–10.5)
CHLORIDE SERPL-SCNC: 102 MMOL/L — SIGNIFICANT CHANGE UP (ref 96–108)
CO2 SERPL-SCNC: 22 MMOL/L — SIGNIFICANT CHANGE UP (ref 22–31)
CREAT SERPL-MCNC: 0.44 MG/DL — LOW (ref 0.5–1.3)
EGFR: 107 ML/MIN/1.73M2 — SIGNIFICANT CHANGE UP
GLUCOSE SERPL-MCNC: 103 MG/DL — HIGH (ref 70–99)
MAGNESIUM SERPL-MCNC: 2 MG/DL — SIGNIFICANT CHANGE UP (ref 1.6–2.6)
POTASSIUM SERPL-MCNC: 3.7 MMOL/L — SIGNIFICANT CHANGE UP (ref 3.5–5.3)
POTASSIUM SERPL-SCNC: 3.7 MMOL/L — SIGNIFICANT CHANGE UP (ref 3.5–5.3)
PROT SERPL-MCNC: 5.8 G/DL — LOW (ref 6–8.3)
SODIUM SERPL-SCNC: 139 MMOL/L — SIGNIFICANT CHANGE UP (ref 135–145)

## 2023-06-11 PROCEDURE — 99232 SBSQ HOSP IP/OBS MODERATE 35: CPT

## 2023-06-11 RX ORDER — PANTOPRAZOLE SODIUM 20 MG/1
40 TABLET, DELAYED RELEASE ORAL
Refills: 0 | Status: DISCONTINUED | OUTPATIENT
Start: 2023-06-11 | End: 2023-06-15

## 2023-06-11 RX ORDER — APIXABAN 2.5 MG/1
2.5 TABLET, FILM COATED ORAL EVERY 12 HOURS
Refills: 0 | Status: DISCONTINUED | OUTPATIENT
Start: 2023-06-11 | End: 2023-06-15

## 2023-06-11 RX ADMIN — APIXABAN 2.5 MILLIGRAM(S): 2.5 TABLET, FILM COATED ORAL at 21:43

## 2023-06-11 RX ADMIN — PANTOPRAZOLE SODIUM 40 MILLIGRAM(S): 20 TABLET, DELAYED RELEASE ORAL at 14:46

## 2023-06-11 RX ADMIN — LIDOCAINE 1 PATCH: 4 CREAM TOPICAL at 00:00

## 2023-06-11 NOTE — PROGRESS NOTE ADULT - SUBJECTIVE AND OBJECTIVE BOX
po intake improving.    GENERAL: No fevers, no chills.  EYES: No blurry vision,  No photophobia  ENT: No sore throat.  No dysphagia  Cardiovascular: No chest pain, palpitations, orthopnea  Pulmonary: No cough, no wheezing. No shortness of breath  Gastrointestinal: no abdominal pain, no diarrhea, no constipation.    Musculoskeletal: No weakness.  No myalgias.  Dermatology:  No rashes.  Neuro: No Headache.  No vertigo.  No dizziness.  Psych: No anxiety, no depression.  Denies suicidal thoughts.    MEDICATIONS  (STANDING):  apixaban 2.5 milliGRAM(s) Oral every 12 hours  lidocaine   4% Patch 1 Patch Transdermal daily  lidocaine   4% Patch 1 Patch Transdermal daily  pantoprazole    Tablet 40 milliGRAM(s) Oral before breakfast  polyethylene glycol 3350 17 Gram(s) Oral daily  senna 2 Tablet(s) Oral at bedtime    MEDICATIONS  (PRN):  acetaminophen     Tablet .. 975 milliGRAM(s) Oral every 6 hours PRN Severe Pain (7 - 10)  acetaminophen     Tablet .. 650 milliGRAM(s) Oral every 6 hours PRN Mild Pain (1 - 3), Moderate Pain (4 - 6)  bisacodyl Suppository 10 milliGRAM(s) Rectal daily PRN Constipation    Vital Signs Last 24 Hrs  T(C): 37.3 (11 Jun 2023 05:11), Max: 37.7 (10 Kelvin 2023 19:23)  T(F): 99.1 (11 Jun 2023 05:11), Max: 99.8 (10 Kelvin 2023 19:23)  HR: 90 (11 Jun 2023 05:11) (88 - 93)  BP: 122/78 (11 Jun 2023 05:11) (120/73 - 122/78)  BP(mean): --  RR: 18 (11 Jun 2023 05:11) (18 - 18)  SpO2: 98% (11 Jun 2023 05:11) (98% - 99%)    Parameters below as of 11 Jun 2023 05:11  Patient On (Oxygen Delivery Method): room air    GENERAL: NAD  HEAD:  Atraumatic, Normocephalic  EYES: EOMI, PERRLA, conjunctiva and sclera clear  ENT: Pharynx not erythematous  PULMONARY: Clear to auscultation bilaterally; No wheeze  CARDIOVASCULAR: Regular rate and rhythm; No murmurs, rubs, or gallops  ABDOMEN: Soft, Nontender, Nondistended; Bowel sounds present  EXTREMITIES:  2+ Peripheral Pulses, No clubbing, cyanosis, or edema  MUSCULOSKELETAL: No calf tenderness  PSYCH: AAOx3, normal affect  SKIN: warm and dry, No rashes or lesions    .  LABS:                         8.1    5.48  )-----------( 399      ( 10 Kelvin 2023 06:36 )             25.4     06-10    139  |  103  |  12  ----------------------------<  87  3.3<L>   |  23  |  0.52    Ca    8.3<L>      10 Kelvin 2023 06:38    TPro  5.6<L>  /  Alb  2.9<L>  /  TBili  1.4<H>  /  DBili  0.6<H>  /  AST  16  /  ALT  16  /  AlkPhos  123<H>  06-10              RADIOLOGY, EKG & ADDITIONAL TESTS: Reviewed.

## 2023-06-11 NOTE — PROGRESS NOTE ADULT - NSPROGADDITIONALINFOA_GEN_ALL_CORE
disposition: DEVIKA RENETTA likely 6/12    Juli Velasco D.O.  Division of Hospital Medicine  Available on MS Teams

## 2023-06-11 NOTE — PROGRESS NOTE ADULT - PROBLEM SELECTOR PLAN 1
- resolved  - XR with dilated Bowel loops concerning for ileus; CT Scan ruled out SBO; showed some possible signs of colitis/ underlying lesion  - tolerating regular diet much better today  - Minimize opioids

## 2023-06-11 NOTE — PROGRESS NOTE ADULT - PROBLEM SELECTOR PLAN 3
- Patient had recent mechanical fall; was seen at [facility]-- Underwent Right Total Hip Arthroplasty  - DVT ppx on 2.5mg Eliquis for 6 weeks  - Limiting Opioid regiment until BM  - RENETTA

## 2023-06-12 ENCOUNTER — TRANSCRIPTION ENCOUNTER (OUTPATIENT)
Age: 68
End: 2023-06-12

## 2023-06-12 DIAGNOSIS — I95.9 HYPOTENSION, UNSPECIFIED: ICD-10-CM

## 2023-06-12 LAB
ANION GAP SERPL CALC-SCNC: 13 MMOL/L — SIGNIFICANT CHANGE UP (ref 5–17)
BUN SERPL-MCNC: 14 MG/DL — SIGNIFICANT CHANGE UP (ref 7–23)
CALCIUM SERPL-MCNC: 9 MG/DL — SIGNIFICANT CHANGE UP (ref 8.4–10.5)
CHLORIDE SERPL-SCNC: 100 MMOL/L — SIGNIFICANT CHANGE UP (ref 96–108)
CO2 SERPL-SCNC: 25 MMOL/L — SIGNIFICANT CHANGE UP (ref 22–31)
CREAT SERPL-MCNC: 0.58 MG/DL — SIGNIFICANT CHANGE UP (ref 0.5–1.3)
EGFR: 100 ML/MIN/1.73M2 — SIGNIFICANT CHANGE UP
GLUCOSE SERPL-MCNC: 141 MG/DL — HIGH (ref 70–99)
HCT VFR BLD CALC: 31 % — LOW (ref 34.5–45)
HGB BLD-MCNC: 9.9 G/DL — LOW (ref 11.5–15.5)
MAGNESIUM SERPL-MCNC: 1.9 MG/DL — SIGNIFICANT CHANGE UP (ref 1.6–2.6)
MCHC RBC-ENTMCNC: 29.6 PG — SIGNIFICANT CHANGE UP (ref 27–34)
MCHC RBC-ENTMCNC: 31.9 GM/DL — LOW (ref 32–36)
MCV RBC AUTO: 92.5 FL — SIGNIFICANT CHANGE UP (ref 80–100)
NRBC # BLD: 0 /100 WBCS — SIGNIFICANT CHANGE UP (ref 0–0)
PHOSPHATE SERPL-MCNC: 3.4 MG/DL — SIGNIFICANT CHANGE UP (ref 2.5–4.5)
PLATELET # BLD AUTO: 515 K/UL — HIGH (ref 150–400)
POTASSIUM SERPL-MCNC: 3.7 MMOL/L — SIGNIFICANT CHANGE UP (ref 3.5–5.3)
POTASSIUM SERPL-SCNC: 3.7 MMOL/L — SIGNIFICANT CHANGE UP (ref 3.5–5.3)
RBC # BLD: 3.35 M/UL — LOW (ref 3.8–5.2)
RBC # FLD: 13.7 % — SIGNIFICANT CHANGE UP (ref 10.3–14.5)
SODIUM SERPL-SCNC: 138 MMOL/L — SIGNIFICANT CHANGE UP (ref 135–145)
WBC # BLD: 9.13 K/UL — SIGNIFICANT CHANGE UP (ref 3.8–10.5)
WBC # FLD AUTO: 9.13 K/UL — SIGNIFICANT CHANGE UP (ref 3.8–10.5)

## 2023-06-12 PROCEDURE — 99232 SBSQ HOSP IP/OBS MODERATE 35: CPT

## 2023-06-12 RX ORDER — SODIUM CHLORIDE 9 MG/ML
1000 INJECTION INTRAMUSCULAR; INTRAVENOUS; SUBCUTANEOUS
Refills: 0 | Status: DISCONTINUED | OUTPATIENT
Start: 2023-06-12 | End: 2023-06-13

## 2023-06-12 RX ADMIN — APIXABAN 2.5 MILLIGRAM(S): 2.5 TABLET, FILM COATED ORAL at 08:57

## 2023-06-12 RX ADMIN — PANTOPRAZOLE SODIUM 40 MILLIGRAM(S): 20 TABLET, DELAYED RELEASE ORAL at 05:46

## 2023-06-12 RX ADMIN — SENNA PLUS 2 TABLET(S): 8.6 TABLET ORAL at 21:08

## 2023-06-12 RX ADMIN — Medication 650 MILLIGRAM(S): at 21:37

## 2023-06-12 RX ADMIN — Medication 650 MILLIGRAM(S): at 21:07

## 2023-06-12 RX ADMIN — APIXABAN 2.5 MILLIGRAM(S): 2.5 TABLET, FILM COATED ORAL at 21:08

## 2023-06-12 NOTE — PROGRESS NOTE ADULT - PROBLEM SELECTOR PLAN 3
- Patient had recent mechanical fall; was seen at [facility]-- Underwent Right Total Hip Arthroplasty  - DVT ppx on 2.5mg Eliquis for 6 weeks  - Limiting Opioid regiment until BM  -she is refusing RENETTA at this point.  Need PT to work with her and evaluate level of needs if she goes home.  Lives in home with a friend, has 3 steps to get inside, but has bathroom on first floor and ability to add hospital bed to first floor.

## 2023-06-12 NOTE — PROGRESS NOTE ADULT - ASSESSMENT
66yo F pmh osteoarthritis, hysterectomy, recent pelvic fx after mechanical fall s/p R DAREK sent from rehab 6/7 with nausea, vomiting, loose BM, AXR with distended ascending, transverse, and descending colon, CT scan without SBO, but with questionable descending colon thickening possibly a mild ileus per GI and surgery, improving now with daily BMs.

## 2023-06-12 NOTE — PROGRESS NOTE ADULT - PROBLEM SELECTOR PLAN 1
- resolved  - XR with dilated Bowel loops concerning for ileus; CT Scan ruled out SBO; showed some possible signs of colitis/ underlying lesion  - Minimize opioids  -seen by GI and patient declines colonoscopy at this time given limited mobility due to hip fracture  - avoid opioids if possible given ileus   - miralax BID, titrate for 1-2 BM a day   - outpatient follow-up with GI  - Please provide patient with [facility]astroenterology Clinic outpatient follow up number for an appointment [phone] ([facility] [place]) or [phone] ([facility] at [address] [place] [address])

## 2023-06-12 NOTE — PROGRESS NOTE ADULT - SUBJECTIVE AND OBJECTIVE BOX
[facility] Division of Hospital Medicine  Ning Cosby MD  Pager (M-F, 8A-5P): [phone]  Other Times:  [phone]    Patient is a 67y old  Female who presents with a chief complaint of Nausea/Vomiting (11 Jun 2023 08:03)      SUBJECTIVE / OVERNIGHT EVENTS:  +flatus and +BM's daily x 3 days.  Still nauseated with small po intake.  also feels dizzy and weak.  Is not at her baseline.  Also very upset with the care she received at [facility] and is not comfortably returning to rehab.    ADDITIONAL REVIEW OF SYSTEMS:    MEDICATIONS  (STANDING):  apixaban 2.5 milliGRAM(s) Oral every 12 hours  lidocaine   4% Patch 1 Patch Transdermal daily  lidocaine   4% Patch 1 Patch Transdermal daily  pantoprazole    Tablet 40 milliGRAM(s) Oral before breakfast  polyethylene glycol 3350 17 Gram(s) Oral daily  senna 2 Tablet(s) Oral at bedtime    MEDICATIONS  (PRN):  acetaminophen     Tablet .. 975 milliGRAM(s) Oral every 6 hours PRN Severe Pain (7 - 10)  acetaminophen     Tablet .. 650 milliGRAM(s) Oral every 6 hours PRN Mild Pain (1 - 3), Moderate Pain (4 - 6)  bisacodyl Suppository 10 milliGRAM(s) Rectal daily PRN Constipation      CAPILLARY BLOOD GLUCOSE        I&O's Summary    11 Jun 2023 07:01  -  12 Jun 2023 07:00  --------------------------------------------------------  IN: 520 mL / OUT: 200 mL / NET: 320 mL    12 Jun 2023 07:01  -  12 Jun 2023 15:03  --------------------------------------------------------  IN: 480 mL / OUT: 400 mL / NET: 80 mL        PHYSICAL EXAM:  Vital Signs Last 24 Hrs  T(C): 37.2 (12 Jun 2023 09:46), Max: 37.2 (12 Jun 2023 04:18)  T(F): 98.9 (12 Jun 2023 09:46), Max: 98.9 (12 Jun 2023 04:18)  HR: 93 (12 Jun 2023 09:46) (85 - 93)  BP: 93/63 (12 Jun 2023 09:46) (93/63 - 100/65)  BP(mean): --  RR: 18 (12 Jun 2023 09:46) (18 - 18)  SpO2: 96% (12 Jun 2023 09:46) (96% - 98%)    Parameters below as of 12 Jun 2023 09:46  Patient On (Oxygen Delivery Method): room air        GENERAL: NAD, appears younger than stated age  HEAD:  Atraumatic, Normocephalic  EYES: EOMI, PERRLA, conjunctiva and sclera clear  ENT: Pharynx not erythematous  PULMONARY: Clear to auscultation bilaterally; No wheeze  CARDIOVASCULAR: Regular rate and rhythm; No murmurs, rubs, or gallops  ABDOMEN: Soft, Nontender, Nondistended; Bowel sounds present  EXTREMITIES:  2+ Peripheral Pulses, No clubbing, cyanosis, or edema  PSYCH: AAOx3, normal affect  SKIN: warm and dry, No rashes or lesions    LABS:                        9.9    9.13  )-----------( 515      ( 12 Jun 2023 11:51 )             31.0     06-12    138  |  100  |  14  ----------------------------<  141<H>  3.7   |  25  |  0.58    Ca    9.0      12 Jun 2023 11:51  Phos  3.4     06-12  Mg     1.9     06-12    TPro  5.8<L>  /  Alb  3.0<L>  /  TBili  1.2  /  DBili  0.4<H>  /  AST  26  /  ALT  18  /  AlkPhos  139<H>  06-11                RADIOLOGY & ADDITIONAL TESTS:  Results Reviewed:   Imaging Personally Reviewed:  Electrocardiogram Personally Reviewed:    COORDINATION OF CARE:  Care Discussed with Consultants/Other Providers [Y/N]:  Prior or Outpatient Records Reviewed [Y/N]:

## 2023-06-12 NOTE — DISCHARGE NOTE NURSING/CASE MANAGEMENT/SOCIAL WORK - NSDCFUADDAPPT_GEN_ALL_CORE_FT
Bautista Oakley  Orthopaedic Surgery  [address], [address]  [place] [address]  Phone: [phone]  Fax: [phone]  Established Patient  Follow Up Time: 7-10 days, call for appointmenet    - followup outpatient once ileus resolves for colonoscopy to evaluate for thickened descending colon   - Please provide patient with [facility] Clinic outpatient follow up number for an appointment [phone] (Faculty Practice in [place]/[facility]) or [phone] ([facility] at [address] [place] [address])

## 2023-06-12 NOTE — DISCHARGE NOTE NURSING/CASE MANAGEMENT/SOCIAL WORK - PATIENT PORTAL LINK FT
You can access the FollowMyHealth Patient Portal offered by [facility] by registering at the following website: [email or web address]. By joining [facility]’s FollowMyHealth portal, you will also be able to view your health information using other applications (apps) compatible with our system.

## 2023-06-13 DIAGNOSIS — E55.9 VITAMIN D DEFICIENCY, UNSPECIFIED: ICD-10-CM

## 2023-06-13 LAB
24R-OH-CALCIDIOL SERPL-MCNC: 17.6 NG/ML — LOW (ref 30–80)
ANION GAP SERPL CALC-SCNC: 14 MMOL/L — SIGNIFICANT CHANGE UP (ref 5–17)
APPEARANCE UR: CLEAR — SIGNIFICANT CHANGE UP
BACTERIA # UR AUTO: NEGATIVE — SIGNIFICANT CHANGE UP
BILIRUB UR-MCNC: NEGATIVE — SIGNIFICANT CHANGE UP
BUN SERPL-MCNC: 12 MG/DL — SIGNIFICANT CHANGE UP (ref 7–23)
CALCIUM SERPL-MCNC: 8.7 MG/DL — SIGNIFICANT CHANGE UP (ref 8.4–10.5)
CHLORIDE SERPL-SCNC: 102 MMOL/L — SIGNIFICANT CHANGE UP (ref 96–108)
CO2 SERPL-SCNC: 24 MMOL/L — SIGNIFICANT CHANGE UP (ref 22–31)
COLOR SPEC: YELLOW — SIGNIFICANT CHANGE UP
CREAT SERPL-MCNC: 0.47 MG/DL — LOW (ref 0.5–1.3)
DIFF PNL FLD: ABNORMAL
EGFR: 105 ML/MIN/1.73M2 — SIGNIFICANT CHANGE UP
EPI CELLS # UR: 3 /HPF — SIGNIFICANT CHANGE UP
GLUCOSE SERPL-MCNC: 105 MG/DL — HIGH (ref 70–99)
GLUCOSE UR QL: NEGATIVE — SIGNIFICANT CHANGE UP
HCT VFR BLD CALC: 30.3 % — LOW (ref 34.5–45)
HGB BLD-MCNC: 9.4 G/DL — LOW (ref 11.5–15.5)
HYALINE CASTS # UR AUTO: 2 /LPF — SIGNIFICANT CHANGE UP (ref 0–2)
KETONES UR-MCNC: NEGATIVE — SIGNIFICANT CHANGE UP
LEUKOCYTE ESTERASE UR-ACNC: ABNORMAL
MAGNESIUM SERPL-MCNC: 1.9 MG/DL — SIGNIFICANT CHANGE UP (ref 1.6–2.6)
MCHC RBC-ENTMCNC: 29.6 PG — SIGNIFICANT CHANGE UP (ref 27–34)
MCHC RBC-ENTMCNC: 31 GM/DL — LOW (ref 32–36)
MCV RBC AUTO: 95.3 FL — SIGNIFICANT CHANGE UP (ref 80–100)
NITRITE UR-MCNC: NEGATIVE — SIGNIFICANT CHANGE UP
NRBC # BLD: 0 /100 WBCS — SIGNIFICANT CHANGE UP (ref 0–0)
PH UR: 6 — SIGNIFICANT CHANGE UP (ref 5–8)
PHOSPHATE SERPL-MCNC: 3.7 MG/DL — SIGNIFICANT CHANGE UP (ref 2.5–4.5)
PLATELET # BLD AUTO: 484 K/UL — HIGH (ref 150–400)
POTASSIUM SERPL-MCNC: 3.3 MMOL/L — LOW (ref 3.5–5.3)
POTASSIUM SERPL-SCNC: 3.3 MMOL/L — LOW (ref 3.5–5.3)
PROT UR-MCNC: SIGNIFICANT CHANGE UP
RBC # BLD: 3.18 M/UL — LOW (ref 3.8–5.2)
RBC # FLD: 13.9 % — SIGNIFICANT CHANGE UP (ref 10.3–14.5)
RBC CASTS # UR COMP ASSIST: 3 /HPF — SIGNIFICANT CHANGE UP (ref 0–4)
SARS-COV-2 RNA SPEC QL NAA+PROBE: SIGNIFICANT CHANGE UP
SODIUM SERPL-SCNC: 140 MMOL/L — SIGNIFICANT CHANGE UP (ref 135–145)
SP GR SPEC: 1.02 — SIGNIFICANT CHANGE UP (ref 1.01–1.02)
UROBILINOGEN FLD QL: NEGATIVE — SIGNIFICANT CHANGE UP
WBC # BLD: 6.41 K/UL — SIGNIFICANT CHANGE UP (ref 3.8–10.5)
WBC # FLD AUTO: 6.41 K/UL — SIGNIFICANT CHANGE UP (ref 3.8–10.5)
WBC UR QL: 4 /HPF — SIGNIFICANT CHANGE UP (ref 0–5)

## 2023-06-13 PROCEDURE — 99232 SBSQ HOSP IP/OBS MODERATE 35: CPT

## 2023-06-13 RX ORDER — CHOLECALCIFEROL (VITAMIN D3) 125 MCG
400 CAPSULE ORAL DAILY
Refills: 0 | Status: DISCONTINUED | OUTPATIENT
Start: 2023-06-13 | End: 2023-06-15

## 2023-06-13 RX ORDER — POTASSIUM CHLORIDE 20 MEQ
10 PACKET (EA) ORAL
Refills: 0 | Status: COMPLETED | OUTPATIENT
Start: 2023-06-13 | End: 2023-06-13

## 2023-06-13 RX ORDER — POTASSIUM CHLORIDE 20 MEQ
40 PACKET (EA) ORAL DAILY
Refills: 0 | Status: DISCONTINUED | OUTPATIENT
Start: 2023-06-13 | End: 2023-06-13

## 2023-06-13 RX ORDER — POTASSIUM CHLORIDE 20 MEQ
40 PACKET (EA) ORAL ONCE
Refills: 0 | Status: COMPLETED | OUTPATIENT
Start: 2023-06-13 | End: 2023-06-13

## 2023-06-13 RX ORDER — SODIUM CHLORIDE 9 MG/ML
1000 INJECTION INTRAMUSCULAR; INTRAVENOUS; SUBCUTANEOUS
Refills: 0 | Status: DISCONTINUED | OUTPATIENT
Start: 2023-06-13 | End: 2023-06-14

## 2023-06-13 RX ADMIN — Medication 100 MILLIEQUIVALENT(S): at 17:47

## 2023-06-13 RX ADMIN — Medication 100 MILLIEQUIVALENT(S): at 22:09

## 2023-06-13 RX ADMIN — Medication 100 MILLIEQUIVALENT(S): at 14:31

## 2023-06-13 RX ADMIN — SODIUM CHLORIDE 100 MILLILITER(S): 9 INJECTION INTRAMUSCULAR; INTRAVENOUS; SUBCUTANEOUS at 00:14

## 2023-06-13 RX ADMIN — PANTOPRAZOLE SODIUM 40 MILLIGRAM(S): 20 TABLET, DELAYED RELEASE ORAL at 05:26

## 2023-06-13 RX ADMIN — APIXABAN 2.5 MILLIGRAM(S): 2.5 TABLET, FILM COATED ORAL at 07:22

## 2023-06-13 RX ADMIN — APIXABAN 2.5 MILLIGRAM(S): 2.5 TABLET, FILM COATED ORAL at 19:30

## 2023-06-13 RX ADMIN — SODIUM CHLORIDE 100 MILLILITER(S): 9 INJECTION INTRAMUSCULAR; INTRAVENOUS; SUBCUTANEOUS at 14:30

## 2023-06-13 NOTE — PROGRESS NOTE ADULT - SUBJECTIVE AND OBJECTIVE BOX
[facility] Division of Hospital Medicine  Ning Cosby MD  Pager (M-F, 8A-5P): [phone]  Other Times:  [phone]    Patient is a 67y old  Female who presents with a chief complaint of Nausea/Vomiting (12 Jun 2023 15:03)      SUBJECTIVE / OVERNIGHT EVENTS:  no acute events overnight  feels weak overall but less lightheaded than yesterday  having bm's    ADDITIONAL REVIEW OF SYSTEMS:    MEDICATIONS  (STANDING):  apixaban 2.5 milliGRAM(s) Oral every 12 hours  cholecalciferol 400 Unit(s) Oral daily  lidocaine   4% Patch 1 Patch Transdermal daily  lidocaine   4% Patch 1 Patch Transdermal daily  pantoprazole    Tablet 40 milliGRAM(s) Oral before breakfast  polyethylene glycol 3350 17 Gram(s) Oral daily  potassium chloride  10 mEq/100 mL IVPB 10 milliEquivalent(s) IV Intermittent every 1 hour  senna 2 Tablet(s) Oral at bedtime  sodium chloride 0.9%. 1000 milliLiter(s) (100 mL/Hr) IV Continuous <Continuous>    MEDICATIONS  (PRN):  acetaminophen     Tablet .. 650 milliGRAM(s) Oral every 6 hours PRN Mild Pain (1 - 3), Moderate Pain (4 - 6)  acetaminophen     Tablet .. 975 milliGRAM(s) Oral every 6 hours PRN Severe Pain (7 - 10)      CAPILLARY BLOOD GLUCOSE        I&O's Summary    12 Jun 2023 07:01  -  13 Jun 2023 07:00  --------------------------------------------------------  IN: 720 mL / OUT: 400 mL / NET: 320 mL    13 Jun 2023 07:01  -  13 Jun 2023 14:47  --------------------------------------------------------  IN: 640 mL / OUT: 0 mL / NET: 640 mL        PHYSICAL EXAM:  Vital Signs Last 24 Hrs  T(C): 37.1 (13 Jun 2023 13:04), Max: 37.3 (12 Jun 2023 23:24)  T(F): 98.8 (13 Jun 2023 13:04), Max: 99.2 (12 Jun 2023 23:24)  HR: 86 (13 Jun 2023 13:04) (77 - 98)  BP: 96/65 (13 Jun 2023 13:04) (96/62 - 103/70)  BP(mean): --  RR: 18 (13 Jun 2023 13:04) (18 - 18)  SpO2: 96% (13 Jun 2023 13:04) (91% - 98%)    Parameters below as of 13 Jun 2023 13:04  Patient On (Oxygen Delivery Method): room air      GENERAL: NAD, appears younger than stated age  HEAD:  Atraumatic, Normocephalic  EYES: EOMI, PERRLA, conjunctiva and sclera clear  ENT: Pharynx not erythematous  PULMONARY: Clear to auscultation bilaterally; No wheeze  CARDIOVASCULAR: Regular rate and rhythm; No murmurs, rubs, or gallops  ABDOMEN: Soft, Nontender, Nondistended; Bowel sounds present  EXTREMITIES:  2+ Peripheral Pulses, No clubbing, cyanosis, or edema  PSYCH: AAOx3, normal affect  SKIN: warm and dry, No rashes or lesions    LABS:                        9.4    6.41  )-----------( 484      ( 13 Jun 2023 07:11 )             30.3     06-13    140  |  102  |  12  ----------------------------<  105<H>  3.3<L>   |  24  |  0.47<L>    Ca    8.7      13 Jun 2023 07:08  Phos  3.7     06-13  Mg     1.9     06-13                  RADIOLOGY & ADDITIONAL TESTS:  Results Reviewed:   Imaging Personally Reviewed:  Electrocardiogram Personally Reviewed:    COORDINATION OF CARE:  Care Discussed with Consultants/Other Providers [Y/N]:  Prior or Outpatient Records Reviewed [Y/N]:

## 2023-06-13 NOTE — PROGRESS NOTE ADULT - PROBLEM SELECTOR PLAN 1
- resolved  - XR with dilated Bowel loops concerning for ileus; CT Scan ruled out SBO; showed some possible signs of colitis/ underlying lesion  - Minimize opioids  -seen by GI and patient declines colonoscopy at this time given limited mobility due to hip fracture  - avoid opioids if possible given ileus   - miralax BID, titrate for 1-2 BM a day   - outpatient follow-up with GI  - Please provide patient with Gastroenterology Clinic outpatient follow up number for an appointment [phone] (Faculty Practice in [place]/[facility]) or [phone] ([facility] at [address] [place] [address])

## 2023-06-13 NOTE — PROGRESS NOTE ADULT - PROBLEM SELECTOR PLAN 3
- Patient had recent mechanical fall; was seen at [facility]-- Underwent Right Total Hip Arthroplasty  - DVT ppx on 2.5mg Eliquis for 6 weeks  - Limiting Opioid regiment until BM  -now agreeable to RENETTA tomorrow

## 2023-06-13 NOTE — PROGRESS NOTE ADULT - ASSESSMENT
66yo F pmh osteoarthritis, hysterectomy, recent pelvic fx after mechanical fall s/p R DAREK sent from rehab 6/7 with nausea, vomiting, loose BM, AXR with distended ascending, transverse, and descending colon, CT scan without SBO, but with questionable descending colon thickening possibly a mild ileus per GI and surgery, improving now with daily BMs d/c planning for [facility] RENETTA tomorrow.

## 2023-06-14 LAB
ANION GAP SERPL CALC-SCNC: 13 MMOL/L — SIGNIFICANT CHANGE UP (ref 5–17)
BUN SERPL-MCNC: 9 MG/DL — SIGNIFICANT CHANGE UP (ref 7–23)
CALCIUM SERPL-MCNC: 8.5 MG/DL — SIGNIFICANT CHANGE UP (ref 8.4–10.5)
CHLORIDE SERPL-SCNC: 105 MMOL/L — SIGNIFICANT CHANGE UP (ref 96–108)
CO2 SERPL-SCNC: 19 MMOL/L — LOW (ref 22–31)
CREAT SERPL-MCNC: 0.46 MG/DL — LOW (ref 0.5–1.3)
EGFR: 105 ML/MIN/1.73M2 — SIGNIFICANT CHANGE UP
GLUCOSE SERPL-MCNC: 86 MG/DL — SIGNIFICANT CHANGE UP (ref 70–99)
HCT VFR BLD CALC: 26.6 % — LOW (ref 34.5–45)
HCT VFR BLD CALC: 29.9 % — LOW (ref 34.5–45)
HGB BLD-MCNC: 8.1 G/DL — LOW (ref 11.5–15.5)
HGB BLD-MCNC: 9.1 G/DL — LOW (ref 11.5–15.5)
MCHC RBC-ENTMCNC: 29.1 PG — SIGNIFICANT CHANGE UP (ref 27–34)
MCHC RBC-ENTMCNC: 29.5 PG — SIGNIFICANT CHANGE UP (ref 27–34)
MCHC RBC-ENTMCNC: 30.4 GM/DL — LOW (ref 32–36)
MCHC RBC-ENTMCNC: 30.5 GM/DL — LOW (ref 32–36)
MCV RBC AUTO: 95.5 FL — SIGNIFICANT CHANGE UP (ref 80–100)
MCV RBC AUTO: 96.7 FL — SIGNIFICANT CHANGE UP (ref 80–100)
NRBC # BLD: 0 /100 WBCS — SIGNIFICANT CHANGE UP (ref 0–0)
NRBC # BLD: 0 /100 WBCS — SIGNIFICANT CHANGE UP (ref 0–0)
PLATELET # BLD AUTO: 441 K/UL — HIGH (ref 150–400)
PLATELET # BLD AUTO: 526 K/UL — HIGH (ref 150–400)
POTASSIUM SERPL-MCNC: 4.5 MMOL/L — SIGNIFICANT CHANGE UP (ref 3.5–5.3)
POTASSIUM SERPL-SCNC: 4.5 MMOL/L — SIGNIFICANT CHANGE UP (ref 3.5–5.3)
RBC # BLD: 2.75 M/UL — LOW (ref 3.8–5.2)
RBC # BLD: 3.13 M/UL — LOW (ref 3.8–5.2)
RBC # FLD: 14.2 % — SIGNIFICANT CHANGE UP (ref 10.3–14.5)
RBC # FLD: 14.3 % — SIGNIFICANT CHANGE UP (ref 10.3–14.5)
SODIUM SERPL-SCNC: 137 MMOL/L — SIGNIFICANT CHANGE UP (ref 135–145)
WBC # BLD: 6.06 K/UL — SIGNIFICANT CHANGE UP (ref 3.8–10.5)
WBC # BLD: 6.5 K/UL — SIGNIFICANT CHANGE UP (ref 3.8–10.5)
WBC # FLD AUTO: 6.06 K/UL — SIGNIFICANT CHANGE UP (ref 3.8–10.5)
WBC # FLD AUTO: 6.5 K/UL — SIGNIFICANT CHANGE UP (ref 3.8–10.5)

## 2023-06-14 PROCEDURE — 99232 SBSQ HOSP IP/OBS MODERATE 35: CPT

## 2023-06-14 RX ORDER — SODIUM CHLORIDE 9 MG/ML
500 INJECTION INTRAMUSCULAR; INTRAVENOUS; SUBCUTANEOUS ONCE
Refills: 0 | Status: COMPLETED | OUTPATIENT
Start: 2023-06-14 | End: 2023-06-14

## 2023-06-14 RX ADMIN — Medication 400 UNIT(S): at 11:34

## 2023-06-14 RX ADMIN — SODIUM CHLORIDE 500 MILLILITER(S): 9 INJECTION INTRAMUSCULAR; INTRAVENOUS; SUBCUTANEOUS at 09:07

## 2023-06-14 RX ADMIN — PANTOPRAZOLE SODIUM 40 MILLIGRAM(S): 20 TABLET, DELAYED RELEASE ORAL at 05:20

## 2023-06-14 RX ADMIN — APIXABAN 2.5 MILLIGRAM(S): 2.5 TABLET, FILM COATED ORAL at 05:20

## 2023-06-14 NOTE — PROGRESS NOTE ADULT - ASSESSMENT
67yoF w/ PMHx of osteoarthritis, hysterectomy, recent pelvic fx after mechanical fall s/p R DAREK sent from rehab 6/7 with nausea, vomiting, loose BM, AXR with distended ascending, transverse, and descending colon, CT scan without SBO, but with questionable descending colon thickening possibly a mild ileus per GI and surgery, improving now with daily BMs d/c planning for [facility] RENETTA pending improvement in hypotension and f/u UA/UCx sent.

## 2023-06-14 NOTE — PROGRESS NOTE ADULT - SUBJECTIVE AND OBJECTIVE BOX
INCOMPLETE NOTE    Jerman Lee M.D.  Division of Hospital Medicine  Available on [employer] TEAMS    SUBJECTIVE / ACUTE INTERVAL EVENTS: Patient seen and examined. No overnight events. No subjective complaints.     OBJECTIVE:   Vital Signs Last 24 Hrs  T(F): 99.1 (2023 04:36), Max: 99.1 (2023 20:33)  HR: 89 (2023 04:36) (86 - 89)  BP: 96/60 (2023 04:36) (96/60 - 99/61)  RR: 18 (2023 04:36) (18 - 18)  SpO2: 96% (2023 04:36) (96% - 96%)    Parameters below as of 2023 04:36  Patient On (Oxygen Delivery Method): room air    I&O's Summary  2023 07:01  -  2023 07:00  --------------------------------------------------------  IN: 1520 mL / OUT: 0 mL / NET: 1520 mL    Physical Examination:  GEN: thin man, laying in stretcher in NAD  PSYCH: A&Ox3, mood and affect appear appropriate   SKIN: intact, no e/o rash  NEURO: no focal neurologic deficits appreciated; CN II-XII intact, sensation intact B/L, motor 5/5 in all mm groups  EYES: PERRL, anicteric, EOMI, no vertical/horizontal nystagmus  HEAD: NC, AT  NECK: supple  RESPI: no accessory muscle use, B/L air entry, CTAB   CARDIO: regular rate/rhythm, no LE edema B/L  ABD: soft, NT, ND, +BS  EXT: patient able to move all extremities spontaneously  VASC: peripheral pulses palpated    Labs:                      8.1    6.06  )-----------( 441      ( 2023 07:28 )             26.6     06-14  137  |  105  |  9   ----------------------------<  86  4.5   |  19<L>  |  0.46<L>    Ca    8.5      2023 07:28  Phos  3.7     06-13  Mg     1.9     06-13    Urinalysis Basic - ( 2023 19:51 )  Color: Yellow / Appearance: Clear / S.024 / pH: x  Gluc: x / Ketone: Negative  / Bili: Negative / Urobili: Negative   Blood: x / Protein: Trace / Nitrite: Negative   Leuk Esterase: Small / RBC: 3 /hpf / WBC 4 /HPF   Sq Epi: x / Non Sq Epi: x / Bacteria: Negative    Consultant(s) Notes Reviewed: Care Coordination  Care Discussed with Consultants/Other Providers: MARK Fofana    MEDICATIONS  (STANDING):  apixaban 2.5 milliGRAM(s) Oral every 12 hours  cholecalciferol 400 Unit(s) Oral daily  lidocaine   4% Patch 1 Patch Transdermal daily  lidocaine   4% Patch 1 Patch Transdermal daily  pantoprazole    Tablet 40 milliGRAM(s) Oral before breakfast  polyethylene glycol 3350 17 Gram(s) Oral daily  senna 2 Tablet(s) Oral at bedtime  sodium chloride 0.9% Bolus 500 milliLiter(s) IV Bolus once    MEDICATIONS  (PRN):  acetaminophen     Tablet .. 650 milliGRAM(s) Oral every 6 hours PRN Mild Pain (1 - 3), Moderate Pain (4 - 6)  acetaminophen     Tablet .. 975 milliGRAM(s) Oral every 6 hours PRN Severe Pain (7 - 10) Jerman Lee M.D.  Division of Hospital Medicine  Available on [employer] TEAMS    SUBJECTIVE / ACUTE INTERVAL EVENTS: Patient seen and examined. No overnight events. Patient remains hypotensive and is quite anxious about this persisting; she noted some burning w/ urination yesterday and hematuria today, but denies lightheadedness/dizziness; orthostatics negative. Will bolus IVF and monitor for hemodynamic improvement; f/u UA/micro and UCx sent.     OBJECTIVE:   Vital Signs Last 24 Hrs  T(F): 99.1 (2023 04:36), Max: 99.1 (2023 20:33)  HR: 89 (2023 04:36) (86 - 89)  BP: 96/60 (2023 04:36) (96/60 - 99/61)  RR: 18 (2023 04:36) (18 - 18)  SpO2: 96% (2023 04:36) (96% - 96%)    Parameters below as of 2023 04:36  Patient On (Oxygen Delivery Method): room air    I&O's Summary  2023 07:01  -  2023 07:00  --------------------------------------------------------  IN: 1520 mL / OUT: 0 mL / NET: 1520 mL    Physical Examination:  GEN: middle aged woman, sitting up in chair in NAD  PSYCH: A&Ox3, mood and affect appear appropriate   NEURO: no focal neurologic deficits appreciated  NECK: supple, no e/o elevated JVP  RESPI: no accessory muscle use, B/L air entry   CARDIO: regular rate/rhythm, no LE edema B/L  ABD: soft, NT, ND  EXT: patient able to move all extremities spontaneously  VASC: peripheral pulses palpated    Labs:                      8.1    6.06  )-----------( 441      ( 2023 07:28 )             26.6     06-14  137  |  105  |  9   ----------------------------<  86  4.5   |  19<L>  |  0.46<L>    Ca    8.5      2023 07:28  Phos  3.7     06-13  Mg     1.9     06-13    Urinalysis Basic - ( 2023 19:51 )  Color: Yellow / Appearance: Clear / S.024 / pH: x  Gluc: x / Ketone: Negative  / Bili: Negative / Urobili: Negative   Blood: x / Protein: Trace / Nitrite: Negative   Leuk Esterase: Small / RBC: 3 /hpf / WBC 4 /HPF   Sq Epi: x / Non Sq Epi: x / Bacteria: Negative    Consultant(s) Notes Reviewed: Care Coordination  Care Discussed with Consultants/Other Providers: MARK Fofana    MEDICATIONS  (STANDING):  apixaban 2.5 milliGRAM(s) Oral every 12 hours  cholecalciferol 400 Unit(s) Oral daily  lidocaine   4% Patch 1 Patch Transdermal daily  lidocaine   4% Patch 1 Patch Transdermal daily  pantoprazole    Tablet 40 milliGRAM(s) Oral before breakfast  polyethylene glycol 3350 17 Gram(s) Oral daily  senna 2 Tablet(s) Oral at bedtime  sodium chloride 0.9% Bolus 500 milliLiter(s) IV Bolus once    MEDICATIONS  (PRN):  acetaminophen     Tablet .. 650 milliGRAM(s) Oral every 6 hours PRN Mild Pain (1 - 3), Moderate Pain (4 - 6)  acetaminophen     Tablet .. 975 milliGRAM(s) Oral every 6 hours PRN Severe Pain (7 - 10)

## 2023-06-14 NOTE — PROGRESS NOTE ADULT - PROBLEM SELECTOR PLAN 1
- resolved  - XR with dilated Bowel loops concerning for ileus; CT Scan ruled out SBO; showed some possible signs of colitis/ underlying lesion  - minimize opioids  - seen by GI and patient declines colonoscopy at this time given limited mobility due to hip fracture  - avoid opioids if possible given ileus   - miralax BID, titrate for 1-2 BM a day   - outpatient follow-up with GI  - please provide patient with Gastroenterology Clinic outpatient follow up number for an appointment [phone] (Faculty Practice in [place]/[facility]) or [phone] ([facility] at [address] [place] [address])

## 2023-06-15 VITALS
SYSTOLIC BLOOD PRESSURE: 109 MMHG | HEART RATE: 92 BPM | RESPIRATION RATE: 18 BRPM | TEMPERATURE: 98 F | OXYGEN SATURATION: 98 % | DIASTOLIC BLOOD PRESSURE: 69 MMHG

## 2023-06-15 LAB
HCT VFR BLD CALC: 27.4 % — LOW (ref 34.5–45)
HGB BLD-MCNC: 8.3 G/DL — LOW (ref 11.5–15.5)
MCHC RBC-ENTMCNC: 29.2 PG — SIGNIFICANT CHANGE UP (ref 27–34)
MCHC RBC-ENTMCNC: 30.3 GM/DL — LOW (ref 32–36)
MCV RBC AUTO: 96.5 FL — SIGNIFICANT CHANGE UP (ref 80–100)
NRBC # BLD: 0 /100 WBCS — SIGNIFICANT CHANGE UP (ref 0–0)
PLATELET # BLD AUTO: 448 K/UL — HIGH (ref 150–400)
RBC # BLD: 2.84 M/UL — LOW (ref 3.8–5.2)
RBC # FLD: 14.3 % — SIGNIFICANT CHANGE UP (ref 10.3–14.5)
WBC # BLD: 5.76 K/UL — SIGNIFICANT CHANGE UP (ref 3.8–10.5)
WBC # FLD AUTO: 5.76 K/UL — SIGNIFICANT CHANGE UP (ref 3.8–10.5)

## 2023-06-15 PROCEDURE — 80053 COMPREHEN METABOLIC PANEL: CPT

## 2023-06-15 PROCEDURE — 73502 X-RAY EXAM HIP UNI 2-3 VIEWS: CPT

## 2023-06-15 PROCEDURE — 99239 HOSP IP/OBS DSCHRG MGMT >30: CPT

## 2023-06-15 PROCEDURE — 81001 URINALYSIS AUTO W/SCOPE: CPT

## 2023-06-15 PROCEDURE — 82435 ASSAY OF BLOOD CHLORIDE: CPT

## 2023-06-15 PROCEDURE — 87086 URINE CULTURE/COLONY COUNT: CPT

## 2023-06-15 PROCEDURE — 85025 COMPLETE CBC W/AUTO DIFF WBC: CPT

## 2023-06-15 PROCEDURE — 83690 ASSAY OF LIPASE: CPT

## 2023-06-15 PROCEDURE — 93971 EXTREMITY STUDY: CPT

## 2023-06-15 PROCEDURE — 83735 ASSAY OF MAGNESIUM: CPT

## 2023-06-15 PROCEDURE — 97116 GAIT TRAINING THERAPY: CPT

## 2023-06-15 PROCEDURE — 73560 X-RAY EXAM OF KNEE 1 OR 2: CPT

## 2023-06-15 PROCEDURE — 96376 TX/PRO/DX INJ SAME DRUG ADON: CPT

## 2023-06-15 PROCEDURE — 80048 BASIC METABOLIC PNL TOTAL CA: CPT

## 2023-06-15 PROCEDURE — 85014 HEMATOCRIT: CPT

## 2023-06-15 PROCEDURE — 82272 OCCULT BLD FECES 1-3 TESTS: CPT

## 2023-06-15 PROCEDURE — 82330 ASSAY OF CALCIUM: CPT

## 2023-06-15 PROCEDURE — 87635 SARS-COV-2 COVID-19 AMP PRB: CPT

## 2023-06-15 PROCEDURE — 82247 BILIRUBIN TOTAL: CPT

## 2023-06-15 PROCEDURE — 86850 RBC ANTIBODY SCREEN: CPT

## 2023-06-15 PROCEDURE — 86901 BLOOD TYPING SEROLOGIC RH(D): CPT

## 2023-06-15 PROCEDURE — 86900 BLOOD TYPING SEROLOGIC ABO: CPT

## 2023-06-15 PROCEDURE — 82248 BILIRUBIN DIRECT: CPT

## 2023-06-15 PROCEDURE — 99285 EMERGENCY DEPT VISIT HI MDM: CPT

## 2023-06-15 PROCEDURE — 85730 THROMBOPLASTIN TIME PARTIAL: CPT

## 2023-06-15 PROCEDURE — 82306 VITAMIN D 25 HYDROXY: CPT

## 2023-06-15 PROCEDURE — 84132 ASSAY OF SERUM POTASSIUM: CPT

## 2023-06-15 PROCEDURE — 74177 CT ABD & PELVIS W/CONTRAST: CPT | Mod: MA

## 2023-06-15 PROCEDURE — 97530 THERAPEUTIC ACTIVITIES: CPT

## 2023-06-15 PROCEDURE — 74018 RADEX ABDOMEN 1 VIEW: CPT

## 2023-06-15 PROCEDURE — 84100 ASSAY OF PHOSPHORUS: CPT

## 2023-06-15 PROCEDURE — 83605 ASSAY OF LACTIC ACID: CPT

## 2023-06-15 PROCEDURE — 86803 HEPATITIS C AB TEST: CPT

## 2023-06-15 PROCEDURE — 82947 ASSAY GLUCOSE BLOOD QUANT: CPT

## 2023-06-15 PROCEDURE — 82803 BLOOD GASES ANY COMBINATION: CPT

## 2023-06-15 PROCEDURE — 97161 PT EVAL LOW COMPLEX 20 MIN: CPT

## 2023-06-15 PROCEDURE — 84295 ASSAY OF SERUM SODIUM: CPT

## 2023-06-15 PROCEDURE — 85018 HEMOGLOBIN: CPT

## 2023-06-15 PROCEDURE — 85610 PROTHROMBIN TIME: CPT

## 2023-06-15 PROCEDURE — 96375 TX/PRO/DX INJ NEW DRUG ADDON: CPT

## 2023-06-15 PROCEDURE — 96374 THER/PROPH/DIAG INJ IV PUSH: CPT | Mod: XU

## 2023-06-15 PROCEDURE — 85027 COMPLETE CBC AUTOMATED: CPT

## 2023-06-15 PROCEDURE — 36415 COLL VENOUS BLD VENIPUNCTURE: CPT

## 2023-06-15 RX ORDER — OXYCODONE HYDROCHLORIDE 5 MG/1
1 TABLET ORAL
Refills: 0 | DISCHARGE

## 2023-06-15 RX ORDER — SENNA PLUS 8.6 MG/1
2 TABLET ORAL
Refills: 0 | DISCHARGE

## 2023-06-15 RX ORDER — PANTOPRAZOLE SODIUM 20 MG/1
1 TABLET, DELAYED RELEASE ORAL
Refills: 0 | DISCHARGE

## 2023-06-15 RX ORDER — CHOLECALCIFEROL (VITAMIN D3) 125 MCG
400 CAPSULE ORAL
Qty: 30 | Refills: 0
Start: 2023-06-15 | End: 2023-07-14

## 2023-06-15 RX ADMIN — LIDOCAINE 1 PATCH: 4 CREAM TOPICAL at 11:58

## 2023-06-15 RX ADMIN — LIDOCAINE 1 PATCH: 4 CREAM TOPICAL at 00:38

## 2023-06-15 RX ADMIN — PANTOPRAZOLE SODIUM 40 MILLIGRAM(S): 20 TABLET, DELAYED RELEASE ORAL at 05:13

## 2023-06-15 RX ADMIN — LIDOCAINE 1 PATCH: 4 CREAM TOPICAL at 07:58

## 2023-06-15 RX ADMIN — Medication 400 UNIT(S): at 12:19

## 2023-06-15 NOTE — PROGRESS NOTE ADULT - PROBLEM SELECTOR PLAN 4
DVT ppx: Lovenox 40mg qd
DVT ppx: eliquis
DVT ppx: eliquis  repleting potassium today  IVF for borderline low blood pressures-improving since yesterday
DVT ppx: eliquis

## 2023-06-15 NOTE — PROGRESS NOTE ADULT - PROVIDER SPECIALTY LIST ADULT
Surgery
Surgery
Hospitalist

## 2023-06-15 NOTE — PROGRESS NOTE ADULT - PROBLEM SELECTOR PLAN 2
- CT significant for pubic rami fracture not visualized on earlier CT Scans   - orthopedics consulted  - PT pending once cleared by ortho
- CT significant for pubic rami fracture not visualized on earlier CT Scans   - orthopedics-- no surgical intervention

## 2023-06-15 NOTE — PROGRESS NOTE ADULT - PROBLEM SELECTOR PLAN 5
improved after fluids
cbc appears hemoconcentrated  not orthostatic  hypotension persists on 6/14 -- will bolus 500cc x1 and f/u UA/UCx
cbc appears hemoconcentrated  not orthostatic  will give trial of 1L ivf
cbc appears hemoconcentrated  not orthostatic  will give trial of 1L ivf

## 2023-06-15 NOTE — PROGRESS NOTE ADULT - REASON FOR ADMISSION
Nausea/Vomiting

## 2023-06-15 NOTE — PROGRESS NOTE ADULT - PROBLEM SELECTOR PROBLEM 2
Pubic ramus fracture

## 2023-06-15 NOTE — PROGRESS NOTE ADULT - PROBLEM SELECTOR PLAN 3
- Patient had recent mechanical fall; was seen at [facility]-- Underwent Right Total Hip Arthroplasty  - DVT ppx on 2.5mg Eliquis for 6 weeks  - limiting opioid regimen monitoring stool count  - now agreeable to RENETTA

## 2023-06-15 NOTE — PROGRESS NOTE ADULT - SUBJECTIVE AND OBJECTIVE BOX
[facility] Division of Hospital Medicine  Ning Cosby MD  Pager (M-F, 8A-5P): [phone]  Other Times:  [phone]    Patient is a 67y old  Female who presents with a chief complaint of Nausea/Vomiting (2023 09:26)      SUBJECTIVE / OVERNIGHT EVENTS:  no acute events overnight.  In general frustrated with communication of plans for returning to [facility], but feeling better.  Having regular BM's, no dysuria.  Feels weak and wants to get better faster but overall now ready for RENETTA.    ADDITIONAL REVIEW OF SYSTEMS:    MEDICATIONS  (STANDING):  apixaban 2.5 milliGRAM(s) Oral every 12 hours  cholecalciferol 400 Unit(s) Oral daily  lidocaine   4% Patch 1 Patch Transdermal daily  lidocaine   4% Patch 1 Patch Transdermal daily  pantoprazole    Tablet 40 milliGRAM(s) Oral before breakfast  polyethylene glycol 3350 17 Gram(s) Oral daily  senna 2 Tablet(s) Oral at bedtime    MEDICATIONS  (PRN):  acetaminophen     Tablet .. 650 milliGRAM(s) Oral every 6 hours PRN Mild Pain (1 - 3), Moderate Pain (4 - 6)      CAPILLARY BLOOD GLUCOSE        I&O's Summary    2023 07:01  -  15 Kelvin 2023 07:00  --------------------------------------------------------  IN: 960 mL / OUT: 0 mL / NET: 960 mL    15 Kelvin 2023 07:01  -  15 Kelvin 2023 13:15  --------------------------------------------------------  IN: 200 mL / OUT: 0 mL / NET: 200 mL        PHYSICAL EXAM:  Vital Signs Last 24 Hrs  T(C): 36.7 (15 Kelvin 2023 12:14), Max: 37.5 (2023 19:32)  T(F): 98.1 (15 Kelvin 2023 12:14), Max: 99.5 (2023 19:32)  HR: 92 (15 Kelvin 2023 12:14) (70 - 92)  BP: 109/69 (15 Kelvin 2023 12:14) (100/61 - 122/70)  BP(mean): --  RR: 18 (15 Kelvin 2023 12:14) (18 - 18)  SpO2: 98% (15 Kelvin 2023 12:14) (94% - 100%)    Parameters below as of 15 Kelvin 2023 04:35  Patient On (Oxygen Delivery Method): room air      Physical Examination:  GEN: middle aged woman, sitting up in chair in NAD  PSYCH: A&Ox3, mood and affect appear appropriate   NEURO: no focal neurologic deficits appreciated  NECK: supple, no e/o elevated JVP  RESPI: no accessory muscle use, B/L air entry   CARDIO: regular rate/rhythm, no LE edema B/L  ABD: soft, NT, ND  EXT: patient able to move all extremities spontaneously  VASC: peripheral pulses palpated    LABS:                        8.3    5.76  )-----------( 448      ( 15 Kelvin 2023 06:34 )             27.4     06-14    137  |  105  |  9   ----------------------------<  86  4.5   |  19<L>  |  0.46<L>    Ca    8.5      2023 07:28            Urinalysis Basic - ( 2023 19:51 )    Color: Yellow / Appearance: Clear / S.024 / pH: x  Gluc: x / Ketone: Negative  / Bili: Negative / Urobili: Negative   Blood: x / Protein: Trace / Nitrite: Negative   Leuk Esterase: Small / RBC: 3 /hpf / WBC 4 /HPF   Sq Epi: x / Non Sq Epi: x / Bacteria: Negative          RADIOLOGY & ADDITIONAL TESTS:  Results Reviewed:   Imaging Personally Reviewed:  Electrocardiogram Personally Reviewed:    COORDINATION OF CARE:  Care Discussed with Consultants/Other Providers [Y/N]:  Prior or Outpatient Records Reviewed [Y/N]:

## 2023-06-15 NOTE — PROGRESS NOTE ADULT - ASSESSMENT
67yoF w/ PMHx of osteoarthritis, hysterectomy, recent pelvic fx after mechanical fall s/p R DAREK sent from rehab 6/7 with nausea, vomiting, loose BM, AXR with distended ascending, transverse, and descending colon, CT scan without SBO, but with questionable descending colon thickening possibly a mild ileus per GI and surgery, improving now with daily BMs, improved blood pressure, and UA negative, for d/c to [facility] today.

## 2023-06-15 NOTE — PROGRESS NOTE ADULT - PROBLEM SELECTOR PROBLEM 3
History of repair of hip fracture

## 2023-06-15 NOTE — PROGRESS NOTE ADULT - PROBLEM SELECTOR PROBLEM 1
Postoperative ileus

## 2023-06-15 NOTE — PROGRESS NOTE ADULT - PROBLEM SELECTOR PLAN 1
- resolved  - XR with dilated Bowel loops concerning for ileus; CT Scan ruled out SBO; showed some possible signs of colitis/ underlying lesion  - minimize opioids  - seen by GI and patient declines colonoscopy at this time given limited mobility due to hip fracture  - avoid opioids if possible given ileus   - miralax BID, titrate for 1-2 BM a day   - outpatient follow-up with GI  - please provide patient with Gastroenterology Clinic outpatient follow up number for an appointment [phone] (Faculty Practice in [place]/[facility]) or [phone] ([facility] at [address] [place] [address])

## 2023-06-15 NOTE — PROGRESS NOTE ADULT - PROBLEM SELECTOR PLAN 6
vit d 17.6  reports known osteoporosis and was supposed to get bisphos prior to fracture  did not tolerate high dose vit d in past (renal stones)  agrees to vit d 400 iu daily and will f/u outpatient

## 2023-06-16 ENCOUNTER — NON-APPOINTMENT (OUTPATIENT)
Age: 68
End: 2023-06-16

## 2023-06-26 ENCOUNTER — APPOINTMENT (OUTPATIENT)
Dept: ORTHOPEDIC SURGERY | Facility: CLINIC | Age: 68
End: 2023-06-26
Payer: MEDICARE

## 2023-06-26 PROBLEM — Z78.9 OTHER SPECIFIED HEALTH STATUS: Chronic | Status: ACTIVE | Noted: 2023-05-30

## 2023-06-26 PROCEDURE — 72170 X-RAY EXAM OF PELVIS: CPT

## 2023-06-26 PROCEDURE — 99024 POSTOP FOLLOW-UP VISIT: CPT

## 2023-07-26 ENCOUNTER — APPOINTMENT (OUTPATIENT)
Dept: INTERNAL MEDICINE | Facility: CLINIC | Age: 68
End: 2023-07-26
Payer: MEDICARE

## 2023-07-26 VITALS
DIASTOLIC BLOOD PRESSURE: 70 MMHG | HEIGHT: 59 IN | BODY MASS INDEX: 24.6 KG/M2 | WEIGHT: 122 LBS | SYSTOLIC BLOOD PRESSURE: 110 MMHG | TEMPERATURE: 97.8 F

## 2023-07-26 PROCEDURE — 81002 URINALYSIS NONAUTO W/O SCOPE: CPT

## 2023-07-26 PROCEDURE — 99213 OFFICE O/P EST LOW 20 MIN: CPT

## 2023-07-26 NOTE — PHYSICAL EXAM
[No Acute Distress] : no acute distress [Well Nourished] : well nourished [No Edema] : there was no peripheral edema [Soft] : abdomen soft [Non Tender] : non-tender [Non-distended] : non-distended

## 2023-07-26 NOTE — HISTORY OF PRESENT ILLNESS
[FreeTextEntry8] : Pt reports UTI symptoms- burning sensation on urination since few days.\par No fever,chills.

## 2023-07-27 LAB
BILIRUB UR QL STRIP: NEGATIVE
GLUCOSE UR-MCNC: NEGATIVE
HCG UR QL: 1 EU/DL
HGB UR QL STRIP.AUTO: NORMAL
KETONES UR-MCNC: NEGATIVE
LEUKOCYTE ESTERASE UR QL STRIP: NORMAL
NITRITE UR QL STRIP: POSITIVE
PH UR STRIP: 6.5
PROT UR STRIP-MCNC: NORMAL
SP GR UR STRIP: 1.02

## 2023-07-31 ENCOUNTER — APPOINTMENT (OUTPATIENT)
Dept: ORTHOPEDIC SURGERY | Facility: CLINIC | Age: 68
End: 2023-07-31
Payer: MEDICARE

## 2023-07-31 DIAGNOSIS — Z96.641 PRESENCE OF RIGHT ARTIFICIAL HIP JOINT: ICD-10-CM

## 2023-07-31 DIAGNOSIS — S72.001D FRACTURE OF UNSPECIFIED PART OF NECK OF RIGHT FEMUR, SUBSEQUENT ENCOUNTER FOR CLOSED FRACTURE WITH ROUTINE HEALING: ICD-10-CM

## 2023-07-31 PROCEDURE — 99024 POSTOP FOLLOW-UP VISIT: CPT

## 2023-07-31 PROCEDURE — 72170 X-RAY EXAM OF PELVIS: CPT

## 2023-08-03 LAB — BACTERIA UR CULT: ABNORMAL

## 2023-08-04 ENCOUNTER — APPOINTMENT (OUTPATIENT)
Dept: INTERNAL MEDICINE | Facility: CLINIC | Age: 68
End: 2023-08-04
Payer: MEDICARE

## 2023-08-04 DIAGNOSIS — N39.0 URINARY TRACT INFECTION, SITE NOT SPECIFIED: ICD-10-CM

## 2023-08-04 PROBLEM — Z96.641 STATUS POST TOTAL REPLACEMENT OF RIGHT HIP: Status: ACTIVE | Noted: 2023-06-23

## 2023-08-04 PROBLEM — S72.001D CLOSED FRACTURE OF NECK OF RIGHT FEMUR WITH ROUTINE HEALING, SUBSEQUENT ENCOUNTER: Status: ACTIVE | Noted: 2023-06-23

## 2023-08-04 PROCEDURE — 99443: CPT | Mod: 95

## 2023-08-04 NOTE — HISTORY OF PRESENT ILLNESS
[de-identified] : Status post right total hip arthroplasty for displaced femoral neck fracture on 5/31/2023. [de-identified] : 67-year-old female underwent right total hip arthroplasty for displaced femoral neck fracture on 5/31/2023.  She presents today  for routine post operative followup. She is overall doing well she is ambulating with a walker/cane. [de-identified] : Physical exam of the right hip. Surgical incision is fully healed there is no erythema drainage or induration. Sensation intact to light touch throughout the right lower extremity L1-S2 TN SPN DPN SN.  Active range of motion of the right hip is 0 to 90 degrees of flexion.  There is 20 degrees of internal rotation 30 degrees of external rotation she has 4 out of 5 strength of her quads hamstrings AB/abductors.  She is able to straight leg raise against gravity.  She has full range of motion of her knee ankle foot and toes.  Normal neurovascular exam with 2+ distal pulses. [de-identified] : AP pelvis taken today and reviewed by me shows well-seated and appropriately placed right total hip arthroplasty with no signs of mechanical failure. [de-identified] : Status post right hip total arthroplasty for displaced femoral neck fracture 5/31/2023. [de-identified] : She is doing very well postoperatively.  She will continue weightbearing and range of motion as tolerated.  She was provided with a prescription for physical therapy for range of motion strengthening gait and ambulation training.  We will see her back in 3 months for routine follow-up.  All questions were answered.

## 2023-08-22 ENCOUNTER — APPOINTMENT (OUTPATIENT)
Dept: INTERNAL MEDICINE | Facility: CLINIC | Age: 68
End: 2023-08-22
Payer: MEDICARE

## 2023-08-22 ENCOUNTER — LABORATORY RESULT (OUTPATIENT)
Age: 68
End: 2023-08-22

## 2023-08-22 PROCEDURE — 36415 COLL VENOUS BLD VENIPUNCTURE: CPT

## 2023-08-23 LAB
25(OH)D3 SERPL-MCNC: 21.4 NG/ML
ALBUMIN SERPL ELPH-MCNC: 4.3 G/DL
ALP BLD-CCNC: 99 U/L
ALT SERPL-CCNC: 7 U/L
ANION GAP SERPL CALC-SCNC: 14 MMOL/L
AST SERPL-CCNC: 16 U/L
BILIRUB SERPL-MCNC: 0.5 MG/DL
BUN SERPL-MCNC: 12 MG/DL
CALCIUM SERPL-MCNC: 10.1 MG/DL
CHLORIDE SERPL-SCNC: 104 MMOL/L
CHOLEST SERPL-MCNC: 224 MG/DL
CO2 SERPL-SCNC: 24 MMOL/L
CREAT SERPL-MCNC: 0.7 MG/DL
EGFR: 95 ML/MIN/1.73M2
ERYTHROCYTE [SEDIMENTATION RATE] IN BLOOD BY WESTERGREN METHOD: 51 MM/HR
ESTIMATED AVERAGE GLUCOSE: 108 MG/DL
GLUCOSE SERPL-MCNC: 99 MG/DL
HBA1C MFR BLD HPLC: 5.4 %
HDLC SERPL-MCNC: 53 MG/DL
LDLC SERPL CALC-MCNC: 153 MG/DL
NONHDLC SERPL-MCNC: 171 MG/DL
POTASSIUM SERPL-SCNC: 4.2 MMOL/L
PROT SERPL-MCNC: 7.2 G/DL
SODIUM SERPL-SCNC: 142 MMOL/L
T3 SERPL-MCNC: 140 NG/DL
T3RU NFR SERPL: 1.2 TBI
T4 FREE SERPL-MCNC: 1.2 NG/DL
TRIGL SERPL-MCNC: 99 MG/DL
TSH SERPL-ACNC: 1.49 UIU/ML

## 2023-08-28 ENCOUNTER — APPOINTMENT (OUTPATIENT)
Dept: CT IMAGING | Facility: CLINIC | Age: 68
End: 2023-08-28
Payer: SELF-PAY

## 2023-08-28 ENCOUNTER — NON-APPOINTMENT (OUTPATIENT)
Age: 68
End: 2023-08-28

## 2023-08-28 ENCOUNTER — APPOINTMENT (OUTPATIENT)
Dept: INTERNAL MEDICINE | Facility: CLINIC | Age: 68
End: 2023-08-28
Payer: MEDICARE

## 2023-08-28 ENCOUNTER — OUTPATIENT (OUTPATIENT)
Dept: OUTPATIENT SERVICES | Facility: HOSPITAL | Age: 68
LOS: 1 days | End: 2023-08-28
Payer: SELF-PAY

## 2023-08-28 VITALS
HEART RATE: 95 BPM | BODY MASS INDEX: 23.79 KG/M2 | DIASTOLIC BLOOD PRESSURE: 74 MMHG | SYSTOLIC BLOOD PRESSURE: 117 MMHG | HEIGHT: 59 IN | WEIGHT: 118 LBS | OXYGEN SATURATION: 98 %

## 2023-08-28 DIAGNOSIS — Z98.890 OTHER SPECIFIED POSTPROCEDURAL STATES: Chronic | ICD-10-CM

## 2023-08-28 DIAGNOSIS — E78.5 HYPERLIPIDEMIA, UNSPECIFIED: ICD-10-CM

## 2023-08-28 DIAGNOSIS — Z90.711 ACQUIRED ABSENCE OF UTERUS WITH REMAINING CERVICAL STUMP: Chronic | ICD-10-CM

## 2023-08-28 DIAGNOSIS — M19.042 PRIMARY OSTEOARTHRITIS, LEFT HAND: ICD-10-CM

## 2023-08-28 DIAGNOSIS — Z23 ENCOUNTER FOR IMMUNIZATION: ICD-10-CM

## 2023-08-28 PROCEDURE — 93000 ELECTROCARDIOGRAM COMPLETE: CPT

## 2023-08-28 PROCEDURE — G0009: CPT

## 2023-08-28 PROCEDURE — 99214 OFFICE O/P EST MOD 30 MIN: CPT | Mod: 25

## 2023-08-28 PROCEDURE — G0439: CPT

## 2023-08-28 PROCEDURE — 75571 CT HRT W/O DYE W/CA TEST: CPT

## 2023-08-28 PROCEDURE — 90677 PCV20 VACCINE IM: CPT

## 2023-08-28 PROCEDURE — 75571 CT HRT W/O DYE W/CA TEST: CPT | Mod: 26

## 2023-08-28 PROCEDURE — 90471 IMMUNIZATION ADMIN: CPT

## 2023-08-28 RX ORDER — OXYCODONE 5 MG/1
5 TABLET ORAL
Qty: 20 | Refills: 0 | Status: COMPLETED | COMMUNITY
Start: 2023-02-16 | End: 2023-08-28

## 2023-08-28 RX ORDER — OXYCODONE 5 MG/1
5 TABLET ORAL
Qty: 5 | Refills: 0 | Status: COMPLETED | COMMUNITY
Start: 2023-03-06 | End: 2023-08-28

## 2023-08-28 RX ORDER — SULFAMETHOXAZOLE AND TRIMETHOPRIM 800; 160 MG/1; MG/1
800-160 TABLET ORAL TWICE DAILY
Qty: 14 | Refills: 0 | Status: COMPLETED | COMMUNITY
Start: 2023-08-04 | End: 2023-08-28

## 2023-08-28 RX ORDER — NITROFURANTOIN (MONOHYDRATE/MACROCRYSTALS) 25; 75 MG/1; MG/1
100 CAPSULE ORAL TWICE DAILY
Qty: 14 | Refills: 0 | Status: COMPLETED | COMMUNITY
Start: 2023-07-27 | End: 2023-08-28

## 2023-08-28 RX ORDER — CIPROFLOXACIN 3 MG/ML
0.3 SOLUTION OPHTHALMIC
Qty: 5 | Refills: 0 | Status: COMPLETED | COMMUNITY
Start: 2023-05-04

## 2023-08-28 RX ORDER — TOBRAMYCIN 3 MG/ML
0.3 SOLUTION/ DROPS OPHTHALMIC
Qty: 5 | Refills: 0 | Status: COMPLETED | COMMUNITY
Start: 2023-05-05

## 2023-08-28 NOTE — PHYSICAL EXAM
[No Acute Distress] : no acute distress [Well Nourished] : well nourished [Well Developed] : well developed [Well-Appearing] : well-appearing [Normal Sclera/Conjunctiva] : normal sclera/conjunctiva [PERRL] : pupils equal round and reactive to light [EOMI] : extraocular movements intact [Normal Outer Ear/Nose] : the outer ears and nose were normal in appearance [No JVD] : no jugular venous distention [No Lymphadenopathy] : no lymphadenopathy [Supple] : supple [Thyroid Normal, No Nodules] : the thyroid was normal and there were no nodules present [No Respiratory Distress] : no respiratory distress  [No Accessory Muscle Use] : no accessory muscle use [Clear to Auscultation] : lungs were clear to auscultation bilaterally [Normal Rate] : normal rate  [Regular Rhythm] : with a regular rhythm [Normal S1, S2] : normal S1 and S2 [No Murmur] : no murmur heard [No Carotid Bruits] : no carotid bruits [No Abdominal Bruit] : a ~M bruit was not heard ~T in the abdomen [No Varicosities] : no varicosities [Pedal Pulses Present] : the pedal pulses are present [No Edema] : there was no peripheral edema [No Palpable Aorta] : no palpable aorta [No Extremity Clubbing/Cyanosis] : no extremity clubbing/cyanosis [Normal Appearance] : normal in appearance [No Axillary Lymphadenopathy] : no axillary lymphadenopathy [Soft] : abdomen soft [Non Tender] : non-tender [Non-distended] : non-distended [No Masses] : no abdominal mass palpated [No HSM] : no HSM [Normal Bowel Sounds] : normal bowel sounds [No CVA Tenderness] : no CVA  tenderness [No Spinal Tenderness] : no spinal tenderness [No Joint Swelling] : no joint swelling [Grossly Normal Strength/Tone] : grossly normal strength/tone [No Rash] : no rash [Coordination Grossly Intact] : coordination grossly intact [No Focal Deficits] : no focal deficits [Normal Gait] : normal gait [Deep Tendon Reflexes (DTR)] : deep tendon reflexes were 2+ and symmetric [Normal Affect] : the affect was normal [Normal Insight/Judgement] : insight and judgment were intact [de-identified] : Well healed posterior right THR scar noted

## 2023-08-28 NOTE — HISTORY OF PRESENT ILLNESS
[FreeTextEntry1] : Pt. presents for a comprehensive evaluation for multiple medical issues.\par   [de-identified] : Patient has been in good overall health this past year. Pt fell and broke her right hip 5/30/2023--had surgery- with Dr. Oakley--had right THR--on 05/31/23- had complications with ileus and colitis --readmitted to the hospital for treatment and was discharged back to rehab. She is better now and still getting PT Twice a week  She had left thumb surgery in 3/2023 with Dr. Liao--did well.   She has a ? h/o moderate to severe MR--discovered by echo 7/2017 incidentally,  She saw Dr. Golden Cosby for cardiac evaluation in the past.  Reports no chest pain or palpitations or shortness of breath and exertion.  She is  / retired from working for the [employer] in 2019 and lives with her aunt whom she cares for.  She has a 35 yr old daughter who had a baby boy in 10/2022--her first grandchild.  Patient lives in a two family home above her 79 yo aunt who suffers from COPD-on chronic oxygen.

## 2023-08-28 NOTE — HEALTH RISK ASSESSMENT
[Intercurrent Urgi Care visits] : went to urgent care [Never (0 pts)] : Never (0 points) [No] : In the past 12 months have you used drugs other than those required for medical reasons? No [No falls in past year] : Patient reported no falls in the past year [0] : 2) Feeling down, depressed, or hopeless: Not at all (0) [Patient reported mammogram was normal] : Patient reported mammogram was normal [Patient reported PAP Smear was normal] : Patient reported PAP Smear was normal [Patient reported bone density results were abnormal] : Patient reported bone density results were abnormal [Patient declined colonoscopy] : Patient declined colonoscopy [HIV test declined] : HIV test declined [Hepatitis C test declined] : Hepatitis C test declined [None] : None [With Family] : lives with family [Retired] : retired [College] : College [] :  [# Of Children ___] : has [unfilled] children [Feels Safe at Home] : Feels safe at home [Fully functional (bathing, dressing, toileting, transferring, walking, feeding)] : Fully functional (bathing, dressing, toileting, transferring, walking, feeding) [Fully functional (using the telephone, shopping, preparing meals, housekeeping, doing laundry, using] : Fully functional and needs no help or supervision to perform IADLs (using the telephone, shopping, preparing meals, housekeeping, doing laundry, using transportation, managing medications and managing finances) [Reports changes in hearing] : Reports changes in hearing [Reports normal functional visual acuity (ie: able to read med bottle)] : Reports normal functional visual acuity [Smoke Detector] : smoke detector [Carbon Monoxide Detector] : carbon monoxide detector [Seat Belt] :  uses seat belt [Sunscreen] : uses sunscreen [With Patient/Caregiver] : , with patient/caregiver [Designated Healthcare Proxy] : Designated healthcare proxy [Name: ___] : Health Care Proxy's Name: [unfilled]  [Relationship: ___] : Relationship: [unfilled] [Never] : Never [de-identified] : for UTI [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 [Audit-CScore] : 0 [de-identified] : Tries to walk daily since her hip fracture and still getting PT twice a week [de-identified] : overall healthy balanced and varied diet without any exclusions --cut out junk food [[id number]] : 0 [Change in mental status noted] : No change in mental status noted [Language] : denies difficulty with language [Behavior] : denies difficulty with behavior [Learning/Retaining New Information] : denies difficulty learning/retaining new information [Handling Complex Tasks] : denies difficulty handling complex tasks [Reasoning] : denies difficulty with reasoning [Spatial Ability and Orientation] : denies difficulty with spatial ability and orientation [Sexually Active] : not sexually active [High Risk Behavior] : no high risk behavior [Reports changes in vision] : Reports no changes in vision [Reports changes in dental health] : Reports no changes in dental health [Guns at Home] : no guns at home [Travel to Developing Areas] : does not  travel to developing areas [TB Exposure] : is not being exposed to tuberculosis [MammogramDate] : 11/22 [MammogramComments] : at [facility] [PapSmearDate] : 01/19 [PapSmearComments] : more than 3 years ago ? name--wants to see new gyn [BoneDensityDate] : 05/23 [BoneDensityComments] : T scores were -1.9 and -2.5 [ColonoscopyDate] : 05/19 [ColonoscopyComments] : did Cologuard- will do again [de-identified] : lives with her aunt [FreeTextEntry2] : retired form [employer] [AdvancecareDate] : 08/23

## 2023-08-28 NOTE — ASSESSMENT
[FreeTextEntry1] : Patient has evidence of osteoporosis from recent DEXA. I recommend starting treatment again with IV request. She is status post recent hip replacement. She will discuss with her orthopedic surgery and receive clearance from him prior to starting treatment with IV Reclast.   I recommend the patient obtain a CT Heart calcium score screening test  as an added means of assessing overall cardiac risks.  Patient is aware insurance will not cover this screening test and is willing to pay out of pocket for this scan.

## 2023-08-30 ENCOUNTER — NON-APPOINTMENT (OUTPATIENT)
Age: 68
End: 2023-08-30

## 2023-09-02 RX ORDER — ZOLEDRONIC ACID 5 MG/100ML
5 INJECTION INTRAVENOUS
Qty: 1 | Refills: 0 | Status: ACTIVE | COMMUNITY
Start: 2023-08-30 | End: 1900-01-01

## 2023-09-06 ENCOUNTER — APPOINTMENT (OUTPATIENT)
Dept: INTERNAL MEDICINE | Facility: CLINIC | Age: 68
End: 2023-09-06
Payer: MEDICARE

## 2023-09-06 VITALS
HEIGHT: 59 IN | SYSTOLIC BLOOD PRESSURE: 112 MMHG | TEMPERATURE: 97.9 F | BODY MASS INDEX: 23.79 KG/M2 | HEART RATE: 78 BPM | OXYGEN SATURATION: 99 % | WEIGHT: 118 LBS | DIASTOLIC BLOOD PRESSURE: 66 MMHG

## 2023-09-06 DIAGNOSIS — Z09 ENCOUNTER FOR FOLLOW-UP EXAMINATION AFTER COMPLETED TREATMENT FOR CONDITIONS OTHER THAN MALIGNANT NEOPLASM: ICD-10-CM

## 2023-09-06 DIAGNOSIS — M81.0 AGE-RELATED OSTEOPOROSIS W/OUT CURRENT PATHOLOGICAL FRACTURE: ICD-10-CM

## 2023-09-06 DIAGNOSIS — M81.0 ENCOUNTER FOR FOLLOW-UP EXAMINATION AFTER COMPLETED TREATMENT FOR CONDITIONS OTHER THAN MALIGNANT NEOPLASM: ICD-10-CM

## 2023-09-06 PROCEDURE — 96372 THER/PROPH/DIAG INJ SC/IM: CPT

## 2023-09-06 PROCEDURE — 99213 OFFICE O/P EST LOW 20 MIN: CPT | Mod: 25

## 2023-09-06 NOTE — PLAN
[FreeTextEntry1] : PT HAS VERY DIFFICULT VEINS TO GIVE IV RECLAST. ANESTEZIOLOGIST WAS ASKED FOR HELP. I HIGHLY RECOMMEND PROLIA INJ. NEXT YEAR.  RECLAST IV ADMINISTERED VIA RIGHT ANTECUBITAL IV ACCESS AND TOLERATED WELL. PT WARNED KEEP WELL HYDRATED

## 2023-09-08 ENCOUNTER — APPOINTMENT (OUTPATIENT)
Dept: DERMATOLOGY | Facility: CLINIC | Age: 68
End: 2023-09-08
Payer: MEDICARE

## 2023-09-08 DIAGNOSIS — L82.1 OTHER SEBORRHEIC KERATOSIS: ICD-10-CM

## 2023-09-08 DIAGNOSIS — L29.9 PRURITUS, UNSPECIFIED: ICD-10-CM

## 2023-09-08 DIAGNOSIS — L85.3 XEROSIS CUTIS: ICD-10-CM

## 2023-09-08 PROCEDURE — 99203 OFFICE O/P NEW LOW 30 MIN: CPT

## 2023-10-08 ENCOUNTER — NON-APPOINTMENT (OUTPATIENT)
Age: 68
End: 2023-10-08

## 2023-10-09 ENCOUNTER — NON-APPOINTMENT (OUTPATIENT)
Age: 68
End: 2023-10-09

## 2023-10-30 ENCOUNTER — APPOINTMENT (OUTPATIENT)
Dept: ORTHOPEDIC SURGERY | Facility: CLINIC | Age: 68
End: 2023-10-30

## 2023-10-31 ENCOUNTER — NON-APPOINTMENT (OUTPATIENT)
Age: 68
End: 2023-10-31

## 2023-11-02 ENCOUNTER — NON-APPOINTMENT (OUTPATIENT)
Age: 68
End: 2023-11-02

## 2023-11-03 ENCOUNTER — RESULT REVIEW (OUTPATIENT)
Age: 68
End: 2023-11-03

## 2023-11-03 ENCOUNTER — APPOINTMENT (OUTPATIENT)
Dept: MAMMOGRAPHY | Facility: IMAGING CENTER | Age: 68
End: 2023-11-03
Payer: MEDICARE

## 2023-11-03 ENCOUNTER — OUTPATIENT (OUTPATIENT)
Dept: OUTPATIENT SERVICES | Facility: HOSPITAL | Age: 68
LOS: 1 days | End: 2023-11-03
Payer: MEDICARE

## 2023-11-03 ENCOUNTER — APPOINTMENT (OUTPATIENT)
Dept: ULTRASOUND IMAGING | Facility: IMAGING CENTER | Age: 68
End: 2023-11-03
Payer: MEDICARE

## 2023-11-03 DIAGNOSIS — Z00.00 ENCOUNTER FOR GENERAL ADULT MEDICAL EXAMINATION WITHOUT ABNORMAL FINDINGS: ICD-10-CM

## 2023-11-03 DIAGNOSIS — Z98.890 OTHER SPECIFIED POSTPROCEDURAL STATES: Chronic | ICD-10-CM

## 2023-11-03 DIAGNOSIS — R92.2 INCONCLUSIVE MAMMOGRAM: ICD-10-CM

## 2023-11-03 DIAGNOSIS — Z90.711 ACQUIRED ABSENCE OF UTERUS WITH REMAINING CERVICAL STUMP: Chronic | ICD-10-CM

## 2023-11-03 PROCEDURE — 77063 BREAST TOMOSYNTHESIS BI: CPT | Mod: 26

## 2023-11-03 PROCEDURE — 77067 SCR MAMMO BI INCL CAD: CPT

## 2023-11-03 PROCEDURE — 76641 ULTRASOUND BREAST COMPLETE: CPT | Mod: 26,50,GY

## 2023-11-03 PROCEDURE — 77067 SCR MAMMO BI INCL CAD: CPT | Mod: 26

## 2023-11-03 PROCEDURE — 76641 ULTRASOUND BREAST COMPLETE: CPT

## 2023-11-03 PROCEDURE — 77063 BREAST TOMOSYNTHESIS BI: CPT

## 2023-11-03 NOTE — H&P ADULT - NSHPREVIEWOFSYSTEMS_GEN_ALL_CORE
REVIEW OF SYSTEMS:  CONSTITUTIONAL: No weakness, fevers or chills  EYES/ENT: No visual changes;  No vertigo or throat pain   NECK: No pain or stiffness  RESPIRATORY: No cough, wheezing, hemoptysis; No shortness of breath  CARDIOVASCULAR: No chest pain or palpitations  GASTROINTESTINAL: No abdominal or epigastric pain. No nausea, vomiting, or hematemesis; No diarrhea or constipation. No melena or hematochezia.  GENITOURINARY: No dysuria, frequency or hematuria  NEUROLOGICAL: No numbness or weakness  SKIN: No itching, rashes REVIEW OF SYSTEMS:  CONSTITUTIONAL: current fevers, but recent fever to 103  EYES/ENT: No visual changes;  No vertigo or throat pain   NECK: No pain or stiffness  RESPIRATORY: No cough, wheezing, hemoptysis; No shortness of breath  CARDIOVASCULAR: No chest pain or palpitations  GASTROINTESTINAL: Diffuse 3/10 localized abdominal pain; improving from earlier in the week  GENITOURINARY: No dysuria, frequency or hematuria  NEUROLOGICAL: No numbness or weakness  SKIN: No itching, rashes Sarecycline Pregnancy And Lactation Text: This medication is Pregnancy Category D and not consider safe during pregnancy. It is also excreted in breast milk.

## 2023-12-13 NOTE — H&P PST ADULT - NEGATIVE GENERAL SYMPTOMS
Health Maintenance Due   Topic Date Due    Hepatitis B Vaccine (1 of 3 - 3-dose series) Never done    Colorectal Cancer Screen-  Never done    Breast Cancer Screening  07/14/2023    COVID-19 Vaccine (3 - 2023-24 season) 09/01/2023    Cervical Cancer Screen 30-64 -  01/22/2024       Patient is due for topics listed above, she wishes to proceed with Mammogram, but is not proceeding with Immunization(s) COVID-19 and Hep B, Cervical cancer screening, and Colorectal Cancer Screening: Colonoscopy at this time.    Patient refused weight.   no fever/no chills/no sweating

## 2023-12-31 PROBLEM — Z23 NEED FOR VACCINATION WITH 13-POLYVALENT PNEUMOCOCCAL CONJUGATE VACCINE: Status: ACTIVE | Noted: 2022-05-12

## 2024-01-16 NOTE — H&P ADULT - PATIENT'S SEXUAL ORIENTATION
Moved to [place] per PCP.    Reva Mcelroy LPN   Clinical Care Coordinator  Primary Care and Wellness       Heterosexual

## 2024-02-07 NOTE — PROGRESS NOTE ADULT - PROBLEM SELECTOR PLAN 3
yes - Patient had recent mechanical fall; was seen at [facility]-- Underwent Right Total Hip Arthroplasty  - DVT ppx on 2.5mg Eliquis for 6 weeks  - limiting opioid regimen monitoring stool count  - now agreeable to [facility]

## 2024-02-12 ENCOUNTER — NON-APPOINTMENT (OUTPATIENT)
Age: 69
End: 2024-02-12

## 2024-05-08 RX ORDER — ESCITALOPRAM OXALATE 10 MG/1
10 TABLET ORAL DAILY
Qty: 30 | Refills: 2 | Status: ACTIVE | COMMUNITY
Start: 2024-05-08 | End: 1900-01-01

## 2024-06-10 ENCOUNTER — APPOINTMENT (OUTPATIENT)
Dept: ORTHOPEDIC SURGERY | Facility: CLINIC | Age: 69
End: 2024-06-10
Payer: MEDICARE

## 2024-06-10 DIAGNOSIS — S60.032D: ICD-10-CM

## 2024-06-10 PROCEDURE — 73130 X-RAY EXAM OF HAND: CPT | Mod: 26,LT

## 2024-06-10 PROCEDURE — 99213 OFFICE O/P EST LOW 20 MIN: CPT

## 2024-06-10 RX ORDER — MELOXICAM 15 MG/1
15 TABLET ORAL
Qty: 30 | Refills: 11 | Status: ACTIVE | COMMUNITY
Start: 2024-06-10 | End: 1900-01-01

## 2024-06-12 ENCOUNTER — APPOINTMENT (OUTPATIENT)
Dept: INTERNAL MEDICINE | Facility: CLINIC | Age: 69
End: 2024-06-12

## 2024-06-12 ENCOUNTER — NON-APPOINTMENT (OUTPATIENT)
Age: 69
End: 2024-06-12

## 2024-06-12 VITALS
HEART RATE: 75 BPM | WEIGHT: 116 LBS | BODY MASS INDEX: 23.39 KG/M2 | SYSTOLIC BLOOD PRESSURE: 124 MMHG | OXYGEN SATURATION: 99 % | DIASTOLIC BLOOD PRESSURE: 79 MMHG | HEIGHT: 59 IN

## 2024-06-12 DIAGNOSIS — R26.81 UNSTEADINESS ON FEET: ICD-10-CM

## 2024-06-12 DIAGNOSIS — R29.6 REPEATED FALLS: ICD-10-CM

## 2024-06-12 DIAGNOSIS — F41.8 OTHER SPECIFIED ANXIETY DISORDERS: ICD-10-CM

## 2024-06-12 DIAGNOSIS — E61.2 MAGNESIUM DEFICIENCY: ICD-10-CM

## 2024-06-12 DIAGNOSIS — R73.09 OTHER ABNORMAL GLUCOSE: ICD-10-CM

## 2024-06-12 DIAGNOSIS — R63.5 ABNORMAL WEIGHT GAIN: ICD-10-CM

## 2024-06-12 LAB — NONINV COLON CA DNA+OCC BLD SCRN STL QL: NEGATIVE

## 2024-06-12 PROCEDURE — 36415 COLL VENOUS BLD VENIPUNCTURE: CPT

## 2024-06-12 PROCEDURE — 99214 OFFICE O/P EST MOD 30 MIN: CPT

## 2024-06-12 RX ORDER — PREDNISONE 10 MG/1
10 TABLET ORAL
Qty: 20 | Refills: 0 | Status: COMPLETED | COMMUNITY
Start: 2024-02-26

## 2024-06-12 RX ORDER — AMOXICILLIN 500 MG/1
500 CAPSULE ORAL
Qty: 20 | Refills: 0 | Status: ACTIVE | COMMUNITY
Start: 2024-03-11

## 2024-06-13 LAB
25(OH)D3 SERPL-MCNC: 28.2 NG/ML
BASOPHILS # BLD AUTO: 0.03 K/UL
BASOPHILS NFR BLD AUTO: 0.6 %
EOSINOPHIL # BLD AUTO: 0.57 K/UL
EOSINOPHIL NFR BLD AUTO: 10.9 %
HCT VFR BLD CALC: 39.6 %
HGB BLD-MCNC: 12.5 G/DL
IMM GRANULOCYTES NFR BLD AUTO: 0.2 %
LYMPHOCYTES # BLD AUTO: 1.63 K/UL
LYMPHOCYTES NFR BLD AUTO: 31.3 %
MAGNESIUM SERPL-MCNC: 2.2 MG/DL
MAN DIFF?: NORMAL
MCHC RBC-ENTMCNC: 29.1 PG
MCHC RBC-ENTMCNC: 31.6 GM/DL
MCV RBC AUTO: 92.3 FL
MONOCYTES # BLD AUTO: 0.38 K/UL
MONOCYTES NFR BLD AUTO: 7.3 %
NEUTROPHILS # BLD AUTO: 2.59 K/UL
NEUTROPHILS NFR BLD AUTO: 49.7 %
PLATELET # BLD AUTO: 238 K/UL
RBC # BLD: 4.29 M/UL
RBC # FLD: 13.6 %
T3 SERPL-MCNC: 111 NG/DL
T3FREE SERPL-MCNC: 2.74 PG/ML
T4 FREE SERPL-MCNC: 1.3 NG/DL
T4 SERPL-MCNC: 10 UG/DL
TSH SERPL-ACNC: 1.58 UIU/ML
VIT B12 SERPL-MCNC: 298 PG/ML
WBC # FLD AUTO: 5.21 K/UL

## 2024-06-17 ENCOUNTER — APPOINTMENT (OUTPATIENT)
Dept: INTERNAL MEDICINE | Facility: CLINIC | Age: 69
End: 2024-06-17
Payer: MEDICARE

## 2024-06-17 VITALS
BODY MASS INDEX: 23.79 KG/M2 | WEIGHT: 118 LBS | HEIGHT: 59 IN | DIASTOLIC BLOOD PRESSURE: 72 MMHG | SYSTOLIC BLOOD PRESSURE: 121 MMHG | OXYGEN SATURATION: 98 % | HEART RATE: 76 BPM | TEMPERATURE: 97.4 F

## 2024-06-17 DIAGNOSIS — E53.8 DEFICIENCY OF OTHER SPECIFIED B GROUP VITAMINS: ICD-10-CM

## 2024-06-17 PROCEDURE — 96372 THER/PROPH/DIAG INJ SC/IM: CPT

## 2024-06-17 PROCEDURE — 99212 OFFICE O/P EST SF 10 MIN: CPT | Mod: 25

## 2024-06-17 RX ORDER — CYANOCOBALAMIN 1000 UG/ML
1000 INJECTION INTRAMUSCULAR; SUBCUTANEOUS
Qty: 0 | Refills: 0 | Status: COMPLETED | OUTPATIENT
Start: 2024-06-17

## 2024-06-17 RX ADMIN — CYANOCOBALAMIN 0 MCG/ML: 1000 INJECTION INTRAMUSCULAR; SUBCUTANEOUS at 00:00

## 2024-06-21 ENCOUNTER — APPOINTMENT (OUTPATIENT)
Dept: ORTHOPEDIC SURGERY | Facility: CLINIC | Age: 69
End: 2024-06-21

## 2024-08-30 ENCOUNTER — NON-APPOINTMENT (OUTPATIENT)
Age: 69
End: 2024-08-30

## 2024-08-31 ENCOUNTER — NON-APPOINTMENT (OUTPATIENT)
Age: 69
End: 2024-08-31

## 2024-11-05 NOTE — PHYSICAL THERAPY INITIAL EVALUATION ADULT - PHYSICAL ASSIST/NONPHYSICAL ASSIST: SIT/SUPINE, REHAB EVAL
The patient's goals for the shift include      The clinical goals for the shift include Patient will be free of aspiration throughout shift.      Problem: Nutrition  Goal: Oral intake greater than 50%  Outcome: Progressing        verbal cues/2 person assist

## 2024-11-06 ENCOUNTER — LABORATORY RESULT (OUTPATIENT)
Age: 69
End: 2024-11-06

## 2024-11-06 ENCOUNTER — APPOINTMENT (OUTPATIENT)
Dept: INTERNAL MEDICINE | Facility: CLINIC | Age: 69
End: 2024-11-06
Payer: MEDICARE

## 2024-11-06 PROCEDURE — 36415 COLL VENOUS BLD VENIPUNCTURE: CPT

## 2024-11-07 LAB
25(OH)D3 SERPL-MCNC: 22.8 NG/ML
ALBUMIN SERPL ELPH-MCNC: 4.1 G/DL
ALP BLD-CCNC: 166 U/L
ALT SERPL-CCNC: 16 U/L
ANION GAP SERPL CALC-SCNC: 13 MMOL/L
APPEARANCE: CLEAR
AST SERPL-CCNC: 32 U/L
BASOPHILS # BLD AUTO: 0.03 K/UL
BASOPHILS NFR BLD AUTO: 0.7 %
BILIRUB SERPL-MCNC: 0.8 MG/DL
BILIRUBIN URINE: NEGATIVE
BLOOD URINE: NEGATIVE
BUN SERPL-MCNC: 14 MG/DL
CALCIUM SERPL-MCNC: 9.4 MG/DL
CHLORIDE SERPL-SCNC: 105 MMOL/L
CHOLEST SERPL-MCNC: 205 MG/DL
CO2 SERPL-SCNC: 22 MMOL/L
COLOR: NORMAL
CREAT SERPL-MCNC: 0.69 MG/DL
EGFR: 95 ML/MIN/1.73M2
EOSINOPHIL # BLD AUTO: 0.47 K/UL
EOSINOPHIL NFR BLD AUTO: 11.4 %
ERYTHROCYTE [SEDIMENTATION RATE] IN BLOOD BY WESTERGREN METHOD: 52 MM/HR
ESTIMATED AVERAGE GLUCOSE: 105 MG/DL
GLUCOSE QUALITATIVE U: NEGATIVE MG/DL
GLUCOSE SERPL-MCNC: 87 MG/DL
HBA1C MFR BLD HPLC: 5.3 %
HCT VFR BLD CALC: 39.5 %
HDLC SERPL-MCNC: 65 MG/DL
HGB BLD-MCNC: 12.6 G/DL
IMM GRANULOCYTES NFR BLD AUTO: 0.2 %
KETONES URINE: NEGATIVE MG/DL
LDLC SERPL CALC-MCNC: 130 MG/DL
LEUKOCYTE ESTERASE URINE: ABNORMAL
LYMPHOCYTES # BLD AUTO: 1.17 K/UL
LYMPHOCYTES NFR BLD AUTO: 28.5 %
MAN DIFF?: NORMAL
MCHC RBC-ENTMCNC: 29.4 PG
MCHC RBC-ENTMCNC: 31.9 G/DL
MCV RBC AUTO: 92.1 FL
MONOCYTES # BLD AUTO: 0.3 K/UL
MONOCYTES NFR BLD AUTO: 7.3 %
NEUTROPHILS # BLD AUTO: 2.13 K/UL
NEUTROPHILS NFR BLD AUTO: 51.9 %
NITRITE URINE: NEGATIVE
NONHDLC SERPL-MCNC: 141 MG/DL
PH URINE: 6
PLATELET # BLD AUTO: 232 K/UL
POTASSIUM SERPL-SCNC: 4.2 MMOL/L
PROT SERPL-MCNC: 7.3 G/DL
PROTEIN URINE: NORMAL MG/DL
RBC # BLD: 4.29 M/UL
RBC # FLD: 13.2 %
SODIUM SERPL-SCNC: 140 MMOL/L
SPECIFIC GRAVITY URINE: 1.02
T3 SERPL-MCNC: 131 NG/DL
T3RU NFR SERPL: 1 TBI
T4 FREE SERPL-MCNC: 1.4 NG/DL
TRIGL SERPL-MCNC: 60 MG/DL
TSH SERPL-ACNC: 1.69 UIU/ML
UROBILINOGEN URINE: 1 MG/DL
VIT B12 SERPL-MCNC: 309 PG/ML
WBC # FLD AUTO: 4.11 K/UL

## 2024-11-07 NOTE — ASU PREOP CHECKLIST - LOOSE TEETH
Anesthesia Evaluation     Patient summary reviewed and Nursing notes reviewed   NPO Solid Status: > 8 hours  NPO Liquid Status: > 2 hours           Airway   Mallampati: II  TM distance: >3 FB  Neck ROM: full  No difficulty expected  Dental - normal exam     Pulmonary    (+) ,sleep apnea  Cardiovascular     (+) hypertension, past MI , CAD, cardiac stents , hyperlipidemia      Neuro/Psych  GI/Hepatic/Renal/Endo    (+) morbid obesity    Musculoskeletal     Abdominal    Substance History      OB/GYN          Other   arthritis,     ROS/Med Hx Other: · The left ventricular cavity is mildly dilated.  · Calculated left ventricular EF = 54% Estimated left ventricular EF was in agreement with the calculated left ventricular EF. Left ventricular systolic function is low normal.  · Left ventricular diastolic function was normal.  · . Trace mitral valve regurgitation is present.  · Trace tricuspid valve regurgitation is present                Anesthesia Plan    ASA 3     ERAS Protocol, spinal, general with block and MAC   total IV anesthesia  intravenous induction     Anesthetic plan, risks, benefits, and alternatives have been provided, discussed and informed consent has been obtained with: patient.    Plan discussed with CRNA.    CODE STATUS:          no

## 2024-11-08 ENCOUNTER — APPOINTMENT (OUTPATIENT)
Dept: ULTRASOUND IMAGING | Facility: IMAGING CENTER | Age: 69
End: 2024-11-08

## 2024-11-08 ENCOUNTER — APPOINTMENT (OUTPATIENT)
Dept: MAMMOGRAPHY | Facility: IMAGING CENTER | Age: 69
End: 2024-11-08

## 2024-11-08 ENCOUNTER — OUTPATIENT (OUTPATIENT)
Dept: OUTPATIENT SERVICES | Facility: HOSPITAL | Age: 69
LOS: 1 days | End: 2024-11-08

## 2024-11-08 DIAGNOSIS — Z00.00 ENCOUNTER FOR GENERAL ADULT MEDICAL EXAMINATION WITHOUT ABNORMAL FINDINGS: ICD-10-CM

## 2024-11-08 DIAGNOSIS — Z90.711 ACQUIRED ABSENCE OF UTERUS WITH REMAINING CERVICAL STUMP: Chronic | ICD-10-CM

## 2024-11-08 DIAGNOSIS — Z98.890 OTHER SPECIFIED POSTPROCEDURAL STATES: Chronic | ICD-10-CM

## 2024-11-08 DIAGNOSIS — R92.8 OTHER ABNORMAL AND INCONCLUSIVE FINDINGS ON DIAGNOSTIC IMAGING OF BREAST: ICD-10-CM

## 2024-11-08 DIAGNOSIS — R92.30 DENSE BREASTS, UNSPECIFIED: ICD-10-CM

## 2024-11-13 ENCOUNTER — APPOINTMENT (OUTPATIENT)
Dept: INTERNAL MEDICINE | Facility: CLINIC | Age: 69
End: 2024-11-13

## 2024-12-16 NOTE — OCCUPATIONAL THERAPY INITIAL EVALUATION ADULT - LEVEL OF INDEPENDENCE: SIT/SUPINE, REHAB EVAL
Addended by: MELISAS ZAVALA on: 12/16/2024 04:33 PM     Modules accepted: Orders     minimum assist (75% patients effort)

## 2025-01-07 ENCOUNTER — NON-APPOINTMENT (OUTPATIENT)
Age: 70
End: 2025-01-07

## 2025-02-20 NOTE — H&P ADULT - NSHPSOURCEINFORD_GEN_ALL_CORE
Chart(s)/Patient
on the discharge service for the patient. I have reviewed and made amendments to the documentation where necessary.

## 2025-05-25 ENCOUNTER — NON-APPOINTMENT (OUTPATIENT)
Age: 70
End: 2025-05-25

## 2025-05-27 ENCOUNTER — APPOINTMENT (OUTPATIENT)
Dept: ORTHOPEDIC SURGERY | Facility: CLINIC | Age: 70
End: 2025-05-27
Payer: MEDICARE

## 2025-05-27 DIAGNOSIS — M19.049 PRIMARY OSTEOARTHRITIS, UNSPECIFIED HAND: ICD-10-CM

## 2025-05-27 PROCEDURE — 73130 X-RAY EXAM OF HAND: CPT | Mod: RT

## 2025-05-27 PROCEDURE — 99212 OFFICE O/P EST SF 10 MIN: CPT | Mod: 25

## 2025-07-09 ENCOUNTER — NON-APPOINTMENT (OUTPATIENT)
Age: 70
End: 2025-07-09

## 2025-07-10 ENCOUNTER — NON-APPOINTMENT (OUTPATIENT)
Age: 70
End: 2025-07-10

## 2025-07-16 ENCOUNTER — NON-APPOINTMENT (OUTPATIENT)
Age: 70
End: 2025-07-16

## 2025-07-23 ENCOUNTER — NON-APPOINTMENT (OUTPATIENT)
Age: 70
End: 2025-07-23

## 2025-09-01 ENCOUNTER — NON-APPOINTMENT (OUTPATIENT)
Age: 70
End: 2025-09-01

## (undated) DEVICE — NDL HYPO REGULAR BEVEL 25G X 1.5" (BLUE)

## (undated) DEVICE — SPECIMEN CONTAINER 100ML

## (undated) DEVICE — SOL IRR POUR H2O 1500ML

## (undated) DEVICE — ELCTR BOVIE BLADE 3/4" EXTENDED LENGTH 6"

## (undated) DEVICE — ELCTR GROUNDING PAD ADULT COVIDIEN

## (undated) DEVICE — POSITIONER FOAM EGG CRATE ULNAR 2PCS (PINK)

## (undated) DEVICE — VENODYNE/SCD SLEEVE CALF MEDIUM

## (undated) DEVICE — SUT TICRON 3-0 36" CV-23

## (undated) DEVICE — PACK TOTAL JOINT

## (undated) DEVICE — WARMING BLANKET LOWER ADULT

## (undated) DEVICE — BLADE SCALPEL SAFETYLOCK #15

## (undated) DEVICE — SUT ETHIBOND 2 30" V37

## (undated) DEVICE — DRAPE IOBAN 33" X 23"

## (undated) DEVICE — POSITIONER STRAP ARMBOARD VELCRO TS-30

## (undated) DEVICE — SUT VICRYL 0 27" OS-6 UNDYED

## (undated) DEVICE — SAW BLADE STRYKER PRECISION THIN MEDIUM NARROW BLADE 5.5MM X 18MM

## (undated) DEVICE — SUT ETHILON 2-0 30" KS

## (undated) DEVICE — SUT MONOSOF 4-0 18" P-13

## (undated) DEVICE — DRSG STERISTRIPS 0.5 X 4"

## (undated) DEVICE — DRSG TAPE MEDIPORE 6"

## (undated) DEVICE — DRSG COBAN 6"

## (undated) DEVICE — POSITIONER FOAM ABDUCTION PILLOW MED (PINK)

## (undated) DEVICE — TOURNIQUET CUFF 18" DUAL PORT SINGLE BLADDER LUER LOCK (BLACK)

## (undated) DEVICE — HOOD T5 PEELAWAY

## (undated) DEVICE — SUT MONOCRYL 5-0 18" P-3 UNDYED

## (undated) DEVICE — TOURNIQUET CUFF 24" DUAL PORT SINGLE BLADDER LUER LOCK  (BLACK)

## (undated) DEVICE — DRAPE TOWEL BLUE 17" X 24"

## (undated) DEVICE — SAW BLADE STRYKER SAGITTAL 3 HOLE OSCILLATING

## (undated) DEVICE — POSITIONER CLIP ON PAD FOR UNIVERSAL LATERAL

## (undated) DEVICE — SUT MONOSOF 5-0 18" P-13

## (undated) DEVICE — LABELS BLANK W PEN

## (undated) DEVICE — DRSG DERMABOND 0.7ML

## (undated) DEVICE — SOL IRR POUR H2O 250ML

## (undated) DEVICE — DRSG WEBRIL 3"

## (undated) DEVICE — SUT VICRYL 2-0 27" CP-1 UNDYED

## (undated) DEVICE — PREP CHLORAPREP HI-LITE ORANGE 26ML

## (undated) DEVICE — DRSG STOCKINETTE IMPERVIOUS XL

## (undated) DEVICE — CANISTER DISPOSABLE THIN WALL 3000CC

## (undated) DEVICE — BUR STRYKER FLUTED ROUND 3MM

## (undated) DEVICE — PACK HAND

## (undated) DEVICE — SOL IRR BAG NS 0.9% 3000ML

## (undated) DEVICE — PACK LIJ BASIC ORTHO

## (undated) DEVICE — Device

## (undated) DEVICE — PROTECTOR HEEL / ELBOW

## (undated) DEVICE — MEDICATION LABELS W MARKER

## (undated) DEVICE — SUT VICRYL 1 36" CTX UNDYED

## (undated) DEVICE — GLV 7 PROTEXIS (WHITE)

## (undated) DEVICE — SOL IRR POUR NS 0.9% 500ML

## (undated) DEVICE — DRSG ACE BANDAGE 2"